# Patient Record
Sex: MALE | Race: WHITE | Employment: OTHER | URBAN - METROPOLITAN AREA
[De-identification: names, ages, dates, MRNs, and addresses within clinical notes are randomized per-mention and may not be internally consistent; named-entity substitution may affect disease eponyms.]

---

## 2020-04-24 ENCOUNTER — HOSPITAL ENCOUNTER (EMERGENCY)
Age: 55
Discharge: HOME | End: 2020-04-24
Attending: EMERGENCY MEDICINE | Admitting: EMERGENCY MEDICINE
Payer: MEDICARE

## 2020-04-24 ENCOUNTER — APPOINTMENT (OUTPATIENT)
Dept: VASCULAR SURGERY | Age: 55
End: 2020-04-24
Attending: PHYSICIAN ASSISTANT
Payer: MEDICARE

## 2020-04-24 ENCOUNTER — APPOINTMENT (OUTPATIENT)
Dept: GENERAL RADIOLOGY | Age: 55
End: 2020-04-24
Attending: PHYSICIAN ASSISTANT
Payer: MEDICARE

## 2020-04-24 VITALS
OXYGEN SATURATION: 98 % | RESPIRATION RATE: 20 BRPM | HEART RATE: 106 BPM | BODY MASS INDEX: 33.86 KG/M2 | SYSTOLIC BLOOD PRESSURE: 136 MMHG | TEMPERATURE: 97.8 F | DIASTOLIC BLOOD PRESSURE: 85 MMHG | HEIGHT: 72 IN | WEIGHT: 250 LBS

## 2020-04-24 DIAGNOSIS — L03.116 LEFT LEG CELLULITIS: Primary | ICD-10-CM

## 2020-04-24 DIAGNOSIS — M54.32 SCIATICA OF LEFT SIDE: ICD-10-CM

## 2020-04-24 PROCEDURE — 99283 EMERGENCY DEPT VISIT LOW MDM: CPT

## 2020-04-24 PROCEDURE — 74011250636 HC RX REV CODE- 250/636: Performed by: PHYSICIAN ASSISTANT

## 2020-04-24 PROCEDURE — 71045 X-RAY EXAM CHEST 1 VIEW: CPT

## 2020-04-24 PROCEDURE — 96372 THER/PROPH/DIAG INJ SC/IM: CPT

## 2020-04-24 PROCEDURE — 93971 EXTREMITY STUDY: CPT

## 2020-04-24 RX ORDER — METHYLPREDNISOLONE 4 MG/1
TABLET ORAL
Qty: 1 DOSE PACK | Refills: 0 | OUTPATIENT
Start: 2020-04-24 | End: 2020-05-03

## 2020-04-24 RX ORDER — HYDROCODONE BITARTRATE AND ACETAMINOPHEN 5; 325 MG/1; MG/1
1 TABLET ORAL
Qty: 8 TAB | Refills: 0 | Status: SHIPPED | OUTPATIENT
Start: 2020-04-24 | End: 2020-04-27

## 2020-04-24 RX ORDER — KETOROLAC TROMETHAMINE 10 MG/1
10 TABLET, FILM COATED ORAL
Qty: 20 TAB | Refills: 0 | Status: SHIPPED | OUTPATIENT
Start: 2020-04-24 | End: 2020-04-29

## 2020-04-24 RX ORDER — KETOROLAC TROMETHAMINE 15 MG/ML
15 INJECTION, SOLUTION INTRAMUSCULAR; INTRAVENOUS
Status: COMPLETED | OUTPATIENT
Start: 2020-04-24 | End: 2020-04-24

## 2020-04-24 RX ADMIN — KETOROLAC TROMETHAMINE 15 MG: 15 INJECTION, SOLUTION INTRAMUSCULAR; INTRAVENOUS at 16:47

## 2020-04-24 NOTE — ED TRIAGE NOTES
Patient to triage with left leg pain. Symptoms started 4 days ago. Pt denies injury/truama. The pt stated he has been working in his yard.

## 2020-04-24 NOTE — ED NOTES
Patient discharged and given discharge instructions. Patient had an opportunity to ask questions. Patient verbalized understanding of discharge instructions. Patient d/c from ED ambulatory, discharge instructions and prescriptions in hand. Patient accompanied by self. Removed and shredded pt's armband.

## 2020-04-24 NOTE — DISCHARGE INSTRUCTIONS
Patient Education        Sciatica: Care Instructions  Your Care Instructions    Sciatica (say \"hrn-AB-ee-kuh\") is an irritation of one of the sciatic nerves, which come from the spinal cord in the lower back. The sciatic nerves and their branches extend down through the buttock to the foot. Sciatica can develop when an injured disc in the back presses against a spinal nerve root. Its main symptom is pain, numbness, or weakness that is often worse in the leg or foot than in the back. Sciatica often will improve and go away with time. Early treatment usually includes medicines and exercises to relieve pain. Follow-up care is a key part of your treatment and safety. Be sure to make and go to all appointments, and call your doctor if you are having problems. It's also a good idea to know your test results and keep a list of the medicines you take. How can you care for yourself at home? · Take pain medicines exactly as directed. ? If the doctor gave you a prescription medicine for pain, take it as prescribed. ? If you are not taking a prescription pain medicine, ask your doctor if you can take an over-the-counter medicine. · Use heat or ice to relieve pain. ? To apply heat, put a warm water bottle, heating pad set on low, or warm cloth on your back. Do not go to sleep with a heating pad on your skin. ? To use ice, put ice or a cold pack on the area for 10 to 20 minutes at a time. Put a thin cloth between the ice and your skin. · Avoid sitting if possible, unless it feels better than standing. · Alternate lying down with short walks. Increase your walking distance as you are able to without making your symptoms worse. · Do not do anything that makes your symptoms worse. When should you call for help? Call 911 anytime you think you may need emergency care.  For example, call if:    · You are unable to move a leg at all.   [place] your doctor now or seek immediate medical care if:    · You have new or worse symptoms in your legs or buttocks. Symptoms may include:  ? Numbness or tingling. ? Weakness. ? Pain.     · You lose bladder or bowel control.    Watch closely for changes in your health, and be sure to contact your doctor if:    · You are not getting better as expected. Where can you learn more? Go to http://tonia-hodan.info/  Enter Z239 in the search box to learn more about \"Sciatica: Care Instructions. \"  Current as of: June 26, 2019Content Version: 12.4  © 5281-9246 [employer]. Care instructions adapted under license by [employer] (which disclaims liability or warranty for this information). If you have questions about a medical condition or this instruction, always ask your healthcare professional. [address] any warranty or liability for your use of this information.

## 2020-04-29 NOTE — ED PROVIDER NOTES
EMERGENCY DEPARTMENT HISTORY AND PHYSICAL EXAM    Date: 4/24/2020  Patient Name: Josse Lucio    History of Presenting Illness     Chief Complaint   Patient presents with    Leg Pain     left         History Provided By: patient        Additional History (Context): Josse Lucio is a 54-year-old male presenting to the emergency department with complaint of left leg pain. States he has had pain first 4 days. No injury or trauma noted. States pain is in the left posterior calf, worse with ambulation. Has not done anything that he thinks of it starts pain. PCP: None    Current Outpatient Medications   Medication Sig Dispense Refill    methylPREDNISolone (Medrol, Robert,) 4 mg tablet Per dose pack instructions 1 Dose Pack 0    ketorolac (TORADOL) 10 mg tablet Take 1 Tab by mouth every six (6) hours as needed for Pain for up to 5 days. 20 Tab 0    aspirin (ASPIRIN) 325 mg tablet Take 325 mg by mouth daily.  atorvastatin (LIPITOR) 80 mg tablet Take 80 mg by mouth daily.  ferrous sulfate (IRON) 325 mg (65 mg iron) EC tablet Take 325 mg by mouth three (3) times daily (with meals). Past History     Past Medical History:  Past Medical History:   Diagnosis Date    Anemia     Hypertension        Past Surgical History:  Past Surgical History:   Procedure Laterality Date    COLONOSCOPY         Family History:  Family History   Problem Relation Age of Onset    Hypertension Mother     High Cholesterol Mother     Elevated Lipids Mother     COPD Father     Heart Failure Brother     Diabetes Brother        Social History:  Social History     Tobacco Use    Smoking status: Current Every Day Smoker     Packs/day: 1.50     Years: 15.00     Pack years: 22.50   Substance Use Topics    Alcohol use:  Yes     Alcohol/week: 0.8 standard drinks     Types: 1 Cans of beer per week     Comment: states that he drinks 3 times per day    Drug use: No       Allergies:  No Known Allergies      Review of Systems       Review of Systems   Constitutional: Negative for chills and fever. HENT: Negative for nasal congestion, sore throat, rhinorrhea  Eyes: Negative. Respiratory: Negative for cough and negative for shortness of breath. Cardiovascular: Negative for chest pain and palpitations. Gastrointestinal: Negative for abdominal pain, constipation, diarrhea, nausea and vomiting. Genitourinary: Negative. Negative for difficulty urinating and flank pain. Musculoskeletal: Negative for back pain. Pos for leg pain Negative for gait problem and neck pain. Skin: Negative. Allergic/Immunologic: Negative. Neurological: Negative for dizziness, weakness, numbness and headaches. Psychiatric/Behavioral: Negative. All other systems reviewed and are negative. All Other Systems Negative  Physical Exam     Vitals:    04/24/20 1422 04/24/20 1709   BP: 135/83 136/85   Pulse: (!) 116 (!) 106   Resp: 20    Temp: 97.8 °F (36.6 °C)    SpO2: 98% 98%   Weight: 113.4 kg (250 lb)    Height: 6' (1.829 m)      Physical Exam  Vitals signs and nursing note reviewed. Constitutional:       General: He is not in acute distress. Appearance: He is well-developed. He is not diaphoretic. Comments: NAD, well hydrated, non toxic     HENT:      Head: Normocephalic and atraumatic. Nose: Nose normal.      Mouth/Throat:      Pharynx: No oropharyngeal exudate. Eyes:      Conjunctiva/sclera: Conjunctivae normal.      Pupils: Pupils are equal, round, and reactive to light. Neck:      Musculoskeletal: Normal range of motion and neck supple. Cardiovascular:      Rate and Rhythm: Normal rate and regular rhythm. Heart sounds: Normal heart sounds. No murmur. Pulmonary:      Effort: Pulmonary effort is normal. No respiratory distress. Breath sounds: Normal breath sounds. No wheezing or rales. Abdominal:      General: There is no distension. Palpations: Abdomen is soft. Tenderness:  There is no abdominal tenderness. There is no guarding. Musculoskeletal: Normal range of motion. General: No swelling. Comments: + left post calf TTP   Lymphadenopathy:      Cervical: No cervical adenopathy. Skin:     General: Skin is warm. Findings: No rash. Neurological:      General: No focal deficit present. Mental Status: He is alert and oriented to person, place, and time. Cranial Nerves: No cranial nerve deficit. Coordination: Coordination normal.   Psychiatric:         Behavior: Behavior normal.           Diagnostic Study Results     Labs -   No results found for this or any previous visit (from the past 12 hour(s)). Radiologic Studies -   XR CHEST PORT   Final Result   IMPRESSION:      Minimal discoid atelectasis or scarring at the left base. CT Results  (Last 48 hours)    None        CXR Results  (Last 48 hours)    None            Medical Decision Making   I am the first provider for this patient. I reviewed the vital signs, available nursing notes, past medical history, past surgical history, family history and social history. Vital Signs-Reviewed the patient's vital signs. Records Reviewed: Nursing notes, old medical records and any previous labs, imaging, visits, consultations pertinent to patient care    Procedures:  Procedures      ED Course: Progress Notes, Reevaluation, and Consults:      Provider Notes (Medical Decision Making):   Pt here with left leg pain. Starts in lower back and radiates down to toes. No skin changes. No wound. NVI  Exam consistent with sciatica, no neuro deficits, no back pain no red flags. Will treat supportively. If again improvement after Toradol injection. MED RECONCILIATION:  No current facility-administered medications for this encounter.       Current Outpatient Medications   Medication Sig    methylPREDNISolone (Medrol, Robert,) 4 mg tablet Per dose pack instructions    ketorolac (TORADOL) 10 mg tablet Take 1 Tab by mouth every six (6) hours as needed for Pain for up to 5 days.  aspirin (ASPIRIN) 325 mg tablet Take 325 mg by mouth daily.  atorvastatin (LIPITOR) 80 mg tablet Take 80 mg by mouth daily.  ferrous sulfate (IRON) 325 mg (65 mg iron) EC tablet Take 325 mg by mouth three (3) times daily (with meals). Disposition:  home    DISCHARGE NOTE:     Pt has been reexamined. Patient has no new complaints, changes, or physical findings. Care plan outlined and precautions discussed. Discussed proper way to take medications. Discussed treatment plan, return precautions, symptomatic relief, and expected time to improvement. All questions answered. Patient is stable for discharge and outpatient management. Patient is ready to go home. Follow-up Information     Follow up With Specialties Details Why Contact Info    [facility] EMERGENCY DEPT Emergency Medicine   [address] [facility] [address]  [address] [facility]0 [address], [address] [place] [address] 95.          Discharge Medication List as of 4/24/2020  5:13 PM      START taking these medications    Details   methylPREDNISolone (Medrol, Robert,) 4 mg tablet Per dose pack instructions, Print, Disp-1 Dose Pack, R-0      ketorolac (TORADOL) 10 mg tablet Take 1 Tab by mouth every six (6) hours as needed for Pain for up to 5 days. , Print, Disp-20 Tab, R-0         CONTINUE these medications which have NOT CHANGED    Details   aspirin (ASPIRIN) 325 mg tablet Take 325 mg by mouth daily. Historical Med, 325 mg      atorvastatin (LIPITOR) 80 mg tablet Take 80 mg by mouth daily. Historical Med, 80 mg      ferrous sulfate (IRON) 325 mg (65 mg iron) EC tablet Take 325 mg by mouth three (3) times daily (with meals). Historical Med, 325 mg                   Diagnosis     Clinical Impression:   sciatica        Dictation disclaimer:  Please note that this dictation was completed with Dragon, the computer voice recognition software. Quite often unanticipated grammatical, syntax, homophones, and other interpretive errors are inadvertently transcribed by the computer software. Please disregard these errors. Please excuse any errors that have escaped final proofreading.

## 2020-04-30 ENCOUNTER — HOSPITAL ENCOUNTER (EMERGENCY)
Age: 55
Discharge: HOME | End: 2020-04-30
Attending: EMERGENCY MEDICINE
Payer: MEDICARE

## 2020-04-30 ENCOUNTER — APPOINTMENT (OUTPATIENT)
Dept: GENERAL RADIOLOGY | Age: 55
End: 2020-04-30
Attending: STUDENT IN AN ORGANIZED HEALTH CARE EDUCATION/TRAINING PROGRAM
Payer: MEDICARE

## 2020-04-30 VITALS
BODY MASS INDEX: 33.86 KG/M2 | RESPIRATION RATE: 20 BRPM | OXYGEN SATURATION: 95 % | TEMPERATURE: 98.4 F | HEIGHT: 72 IN | WEIGHT: 250 LBS | SYSTOLIC BLOOD PRESSURE: 131 MMHG | HEART RATE: 90 BPM | DIASTOLIC BLOOD PRESSURE: 83 MMHG

## 2020-04-30 DIAGNOSIS — M54.32 SCIATICA OF LEFT SIDE: Primary | ICD-10-CM

## 2020-04-30 PROCEDURE — 74011250637 HC RX REV CODE- 250/637: Performed by: STUDENT IN AN ORGANIZED HEALTH CARE EDUCATION/TRAINING PROGRAM

## 2020-04-30 PROCEDURE — 99284 EMERGENCY DEPT VISIT MOD MDM: CPT

## 2020-04-30 PROCEDURE — 72110 X-RAY EXAM L-2 SPINE 4/>VWS: CPT

## 2020-04-30 RX ORDER — ACETAMINOPHEN 500 MG
1000 TABLET ORAL
Status: COMPLETED | OUTPATIENT
Start: 2020-04-30 | End: 2020-04-30

## 2020-04-30 RX ORDER — CYCLOBENZAPRINE HCL 5 MG
5 TABLET ORAL
Qty: 30 TAB | Refills: 0 | OUTPATIENT
Start: 2020-04-30 | End: 2020-05-03

## 2020-04-30 RX ORDER — CYCLOBENZAPRINE HCL 10 MG
5 TABLET ORAL
Status: COMPLETED | OUTPATIENT
Start: 2020-04-30 | End: 2020-04-30

## 2020-04-30 RX ADMIN — CYCLOBENZAPRINE 5 MG: 10 TABLET, FILM COATED ORAL at 10:05

## 2020-04-30 RX ADMIN — ACETAMINOPHEN 1000 MG: 500 TABLET ORAL at 10:05

## 2020-04-30 NOTE — ED PROVIDER NOTES
EMERGENCY DEPARTMENT HISTORY AND PHYSICAL EXAM    9:12 AM      Date: 4/30/2020  Patient Name: Francisco Rider    History of Presenting Illness     Chief Complaint   Patient presents with    Hip Pain     L hip radiates down leg       History Provided By: Patient  Location/Duration/Severity/Modifying factors   HPI    Patient is a 54year old M with PMH of hypertension, anemia who presents with left hip and leg pain. Patient states this began approximately 8 days ago. He presented to the ED this past week for similar symptoms. He reports sharp pain that starts in his left hip and shoots down to his knee and foot. He denies any trauma or falls. He denies any rashes. Rates the pain as 9/10. He has been taking methylprednisolone, Toradol, and norco for the pain. He has not received significant improvement from this regimen. He denies any lower extremity weakness, sensation loss, loss of bowel or bladder function, or symptoms consistent with saddle anaesthesia. Patient has not seen his PCP for this problem. PCP: None    Current Outpatient Medications   Medication Sig Dispense Refill    methylPREDNISolone (Medrol, Robert,) 4 mg tablet Per dose pack instructions 1 Dose Pack 0    aspirin (ASPIRIN) 325 mg tablet Take 325 mg by mouth daily.  atorvastatin (LIPITOR) 80 mg tablet Take 80 mg by mouth daily.  ferrous sulfate (IRON) 325 mg (65 mg iron) EC tablet Take 325 mg by mouth three (3) times daily (with meals).          Past History     Past Medical History:  Past Medical History:   Diagnosis Date    Anemia     Hypertension        Past Surgical History:  Past Surgical History:   Procedure Laterality Date    COLONOSCOPY         Family History:  Family History   Problem Relation Age of Onset    Hypertension Mother     High Cholesterol Mother     Elevated Lipids Mother     COPD Father     Heart Failure Brother     Diabetes Brother        Social History:  Social History     Tobacco Use    Smoking status: Current Every Day Smoker     Packs/day: 1.50     Years: 15.00     Pack years: 22.50    Smokeless tobacco: Current User   Substance Use Topics    Alcohol use: Yes     Alcohol/week: 0.8 standard drinks     Types: 1 Cans of beer per week     Comment: states that he drinks 3 times per day    Drug use: No       Allergies:  No Known Allergies      Review of Systems       Review of Systems   Constitutional: Negative. HENT: Negative. Eyes: Negative. Respiratory: Negative. Cardiovascular: Negative. Gastrointestinal: Negative. Genitourinary: Negative. Musculoskeletal:        Left hip, knee, foot pain      Skin: Negative for rash. Neurological: Negative for weakness and numbness. Hematological: Negative. Physical Exam     Visit Vitals  BP (!) 147/99 (BP 1 Location: Left arm)   Pulse 90   Temp 98.4 °F (36.9 °C)   Resp 20   Ht 6' (1.829 m)   Wt 113.4 kg (250 lb)   SpO2 95%   BMI 33.91 kg/m²         Physical Exam  Constitutional:       Comments: Appears to be in pain due to left lower extremity    Eyes:      General: No scleral icterus. Cardiovascular:      Rate and Rhythm: Normal rate and regular rhythm. Heart sounds: No murmur. No gallop. Pulmonary:      Effort: No respiratory distress. Breath sounds: No stridor. No wheezing, rhonchi or rales. Abdominal:      General: Bowel sounds are normal. There is no distension. Palpations: Abdomen is soft. Tenderness: There is no abdominal tenderness. There is no guarding. Hernia: No hernia is present. Musculoskeletal:      Right lower leg: No edema. Left lower leg: No edema. Comments: Right lower extremity wnl, left hip grossly normal in appearance without rash, erythema, swelling, or deformity, straight leg raise positive on the left, some pain with internal rotation of the hip    Neurological:      General: No focal deficit present. Sensory: No sensory deficit. Motor: No weakness. Comments: No saddle anesthesia. Psychiatric:         Mood and Affect: Mood normal.         Behavior: Behavior normal.           Diagnostic Study Results     Labs -  No results found for this or any previous visit (from the past 12 hour(s)). Radiologic Studies -   XR SPINE LUMB MIN 4 V   Final Result   IMPRESSION:      No clearly acute findings. Notable lower lumbar degenerative changes. Medical Decision Making   I am the first provider for this patient. I reviewed the vital signs, available nursing notes, past medical history, past surgical history, family history and social history. Vital Signs-Reviewed the patient's vital signs. Records Reviewed: Old Medical Records (Time of Review: 9:12 AM)    ED Course: Progress Notes, Reevaluation, and Consults:         Provider Notes (Medical Decision Making):   MDM   DDX: sciatica, RO, trochanteric bursitis, osteoarthritis, claudication, lumbar disk herniation     Patient is a 54year old M with PMH of hypertension, anemia who presents with left hip and leg pain. Patient with shooting pain from left buttock to left lower extremity. VSS. No concern for RO as he has no loss of bowel or bladder function, no saddle anesthesia. Physical examination consistent with sciatica. No focal deficits. Lumbar x rays show degenerative changes, negative for acute findings. Minimal/no improvement with Toradol, methylprednisone, norco. Will plan to trial flexeril and outpatient follow up with spine specialist. Patient stable for discharge home with follow up with PCP and ortho spine. Patient in agreement with plan. ED return precautions given if symptoms consistent with RO should develop. ATTENDING ATTESTATION:  Patient with sciatica no red flag symptoms  I personally saw and examined the patient. I have reviewed and agree with the residents findings, including all diagnostic interpretations, and plans as written.  I was present during the key portions of separately billed procedures. Kwesi Dc MD    Procedures      Diagnosis     Clinical Impression:   1. Sciatica of left side        Disposition: Home     Follow-up Information     Follow up With Specialties Details Why [address] EMERGENCY DEPT Emergency Medicine  If symptoms worsen [address] [facility]  Schedule an appointment as soon as possible for a visit   Nora Pollard. [id number] [facility] [address]  [phone]    Primary Care Doctor  Schedule an appointment as soon as possible for a visit              Patient's Medications   Start Taking    No medications on file   Continue Taking    ASPIRIN (ASPIRIN) 325 MG TABLET    Take 325 mg by mouth daily. ATORVASTATIN (LIPITOR) 80 MG TABLET    Take 80 mg by mouth daily. FERROUS SULFATE (IRON) 325 MG (65 MG IRON) EC TABLET    Take 325 mg by mouth three (3) times daily (with meals). METHYLPREDNISOLONE (MEDROL, JACK,) 4 MG TABLET    Per dose pack instructions   These Medications have changed    No medications on file   Stop Taking    No medications on file     Disclaimer: Sections of this note are dictated using utilizing voice recognition software. Minor typographical errors may be present. If questions arise, please do not hesitate to contact me or call our department.

## 2020-04-30 NOTE — ED NOTES
Pt with 7/10 pain to L lower back/hip. Minimal tingling to R lower leg. Meds prescribed here on 4/24 not helping per pt report. Able to ambulate, but it is uncomfortable.  Hooked up to continuous pulse ox, BP.

## 2020-04-30 NOTE — ED TRIAGE NOTES
Pt reports L hip pain that radiates down L leg for 5-6 days; seen over the weekend; prescribed Toradol, Norco and  Methylprednisolone dose pack with no relief.

## 2020-04-30 NOTE — ED NOTES
Pt discharge instructions reviewed with patient, patient denies questions and verbalizes understanding. Pt alert and oriented, no signs of distress.

## 2020-05-03 ENCOUNTER — HOSPITAL ENCOUNTER (EMERGENCY)
Age: 55
Discharge: HOME | End: 2020-05-03
Attending: EMERGENCY MEDICINE
Payer: MEDICARE

## 2020-05-03 VITALS
OXYGEN SATURATION: 96 % | SYSTOLIC BLOOD PRESSURE: 178 MMHG | HEART RATE: 104 BPM | TEMPERATURE: 99.4 F | RESPIRATION RATE: 22 BRPM | DIASTOLIC BLOOD PRESSURE: 98 MMHG

## 2020-05-03 DIAGNOSIS — M54.42 ACUTE LEFT-SIDED LOW BACK PAIN WITH LEFT-SIDED SCIATICA: Primary | ICD-10-CM

## 2020-05-03 DIAGNOSIS — R03.0 ELEVATED BLOOD PRESSURE READING: ICD-10-CM

## 2020-05-03 PROCEDURE — 99282 EMERGENCY DEPT VISIT SF MDM: CPT

## 2020-05-03 PROCEDURE — 74011250636 HC RX REV CODE- 250/636: Performed by: PHYSICIAN ASSISTANT

## 2020-05-03 PROCEDURE — 96372 THER/PROPH/DIAG INJ SC/IM: CPT

## 2020-05-03 RX ORDER — ACETAMINOPHEN 500 MG
1000 TABLET ORAL
Qty: 20 TAB | Refills: 0 | Status: ON HOLD | OUTPATIENT
Start: 2020-05-03 | End: 2021-10-12

## 2020-05-03 RX ORDER — METAXALONE 800 MG/1
800 TABLET ORAL 3 TIMES DAILY
Qty: 20 TAB | Refills: 0 | Status: SHIPPED | OUTPATIENT
Start: 2020-05-03 | End: 2020-05-10

## 2020-05-03 RX ORDER — LIDOCAINE 50 MG/G
PATCH TOPICAL
Qty: 1 PACKAGE | Refills: 0 | Status: SHIPPED | OUTPATIENT
Start: 2020-05-03 | End: 2020-05-26 | Stop reason: SDUPTHER

## 2020-05-03 RX ORDER — KETOROLAC TROMETHAMINE 15 MG/ML
15 INJECTION, SOLUTION INTRAMUSCULAR; INTRAVENOUS
Status: COMPLETED | OUTPATIENT
Start: 2020-05-03 | End: 2020-05-03

## 2020-05-03 RX ADMIN — KETOROLAC TROMETHAMINE 15 MG: 15 INJECTION, SOLUTION INTRAMUSCULAR; INTRAVENOUS at 14:28

## 2020-05-03 NOTE — DISCHARGE INSTRUCTIONS
Patient Education      Take medication as prescribed. Follow-up with the orthopedic physician within 2 days for reassessment. Bring the results from this visit with you for their review. Return to the ED immediately for any new, worsening, or persistent symptoms, including fever, abdominal pain, or any other medical concerns. Learning About Relief for Back Pain  What is back tension and strain? Back strain happens when you overstretch, or pull, a muscle in your back. You may hurt your back in an accident or when you exercise or lift something. Most back pain will get better with rest and time. You can take care of yourself at home to help your back heal.  What can you do first to relieve back pain? When you first feel back pain, try these steps:  · Walk. Take a short walk (10 to 20 minutes) on a level surface (no slopes, hills, or stairs) every 2 to 3 hours. Walk only distances you can manage without pain, especially leg pain. · Relax. Find a comfortable position for rest. Some people are comfortable on the floor or a medium-firm bed with a small pillow under their head and another under their knees. Some people prefer to lie on their side with a pillow between their knees. Don't stay in one position for too long. · Try heat or ice. Try using a heating pad on a low or medium setting, or take a warm shower, for 15 to 20 minutes every 2 to 3 hours. Or you can buy single-use heat wraps that last up to 8 hours. You can also try an ice pack for 10 to 15 minutes every 2 to 3 hours. You can use an ice pack or a bag of frozen vegetables wrapped in a thin towel. There is not strong evidence that either heat or ice will help, but you can try them to see if they help. You may also want to try switching between heat and cold. · Take pain medicine exactly as directed. ¨ If the doctor gave you a prescription medicine for pain, take it as prescribed.   ¨ If you are not taking a prescription pain medicine, ask your doctor if you can take an over-the-counter medicine. What else can you do? · Stretch and exercise. Exercises that increase flexibility may relieve your pain and make it easier for your muscles to keep your spine in a good, neutral position. And don't forget to keep walking. · Do self-massage. You can use self-massage to unwind after work or school or to energize yourself in the morning. You can easily massage your feet, hands, or neck. Self-massage works best if you are in comfortable clothes and are sitting or lying in a comfortable position. Use oil or lotion to massage bare skin. · Reduce stress. Back pain can lead to a vicious [place]: Distress about the pain tenses the muscles in your back, which in turn causes more pain. Learn how to relax your mind and your muscles to lower your stress. Where can you learn more? Go to http://toniaTamocohodan.info/. Enter W927 in the search box to learn more about \"Learning About Relief for Back Pain. \"  Current as of: March 21, 2017  Content Version: 11.5  © 5051-8835 [employer]. Care instructions adapted under license by [employer] (which disclaims liability or warranty for this information). If you have questions about a medical condition or this instruction, always ask your healthcare professional. [place] [address] any warranty or liability for your use of this information. Patient Education        Elevated Blood Pressure: Care Instructions  Your Care Instructions    Blood pressure is a measure of how hard the blood pushes against the walls of your arteries. It's normal for blood pressure to go up and down throughout the day. But if it stays up over time, you have high blood pressure. Two numbers tell you your blood pressure. The first number is the systolic pressure. It shows how hard the blood pushes when your heart is pumping. The second number is the diastolic pressure.  It shows how hard the blood pushes between heartbeats, when your heart is relaxed and filling with blood. An ideal blood pressure in adults is less than 120/80 (say \"120 over 80\"). High blood pressure is 140/90 or higher. You have high blood pressure if your top number is 140 or higher or your bottom number is 90 or higher, or both. The main test for high blood pressure is simple, fast, and painless. To diagnose high blood pressure, your doctor will test your blood pressure at different times. After testing your blood pressure, your doctor may ask you to test it again when you are home. If you are diagnosed with high blood pressure, you can work with your doctor to make a long-term plan to manage it. Follow-up care is a key part of your treatment and safety. Be sure to make and go to all appointments, and call your doctor if you are having problems. It's also a good idea to know your test results and keep a list of the medicines you take. How can you care for yourself at home? · Do not smoke. Smoking increases your risk for heart attack and stroke. If you need help quitting, talk to your doctor about stop-smoking programs and medicines. These can increase your chances of quitting for good. · Stay at a healthy weight. · Try to limit how much sodium you eat to less than 2,300 milligrams (mg) a day. Your doctor may ask you to try to eat less than 1,500 mg a day. · Be physically active. Get at least 30 minutes of exercise on most days of the week. Walking is a good choice. You also may want to do other activities, such as running, swimming, cycling, or playing tennis or team sports. · Avoid or limit alcohol. Talk to your doctor about whether you can drink any alcohol. · Eat plenty of fruits, vegetables, and low-fat dairy products. Eat less saturated and total fats. · Learn how to check your blood pressure at home. When should you call for help?   Call your doctor now or seek immediate medical care if:  ? · Your blood pressure is much higher than normal (such as 180/110 or higher). ? · You think high blood pressure is causing symptoms such as:  ¨ Severe headache. ¨ Blurry vision. ? Watch closely for changes in your health, and be sure to contact your doctor if:  ? · You do not get better as expected. Where can you learn more? Go to http://tonia-hodan.info/. Enter N575 in the search box to learn more about \"Elevated Blood Pressure: Care Instructions. \"  Current as of: September 21, 2016  Content Version: 11.4  © 1775-4355 [employer]. Care instructions adapted under license by [employer] (which disclaims liability or warranty for this information). If you have questions about a medical condition or this instruction, always ask your healthcare professional. [place] [address] any warranty or liability for your use of this information. Patient Education        Back Pain: Care Instructions  Your Care Instructions    Back pain has many possible causes. It is often related to problems with muscles and ligaments of the back. It may also be related to problems with the nerves, discs, or bones of the back. Moving, lifting, standing, sitting, or sleeping in an awkward way can strain the back. Sometimes you don't notice the injury until later. Arthritis is another common cause of back pain. Although it may hurt a lot, back pain usually improves on its own within several weeks. Most people recover in 12 weeks or less. Using good home treatment and being careful not to stress your back can help you feel better sooner. Follow-up care is a key part of your treatment and safety. Be sure to make and go to all appointments, and call your doctor if you are having problems. It's also a good idea to know your test results and keep a list of the medicines you take. How can you care for yourself at home? · Sit or lie in positions that are most comfortable and reduce your pain.  Try one of these positions when you lie down:  ? Lie on your back with your knees bent and supported by large pillows. ? Lie on the floor with your legs on the seat of a sofa or chair. ? Lie on your side with your knees and hips bent and a pillow between your legs. ? Lie on your stomach if it does not make pain worse. · Do not sit up in bed, and avoid soft couches and twisted positions. Bed rest can help relieve pain at first, but it delays healing. Avoid bed rest after the first day of back pain. · Change positions every 30 minutes. If you must sit for long periods of time, take breaks from sitting. Get up and walk around, or lie in a comfortable position. · Try using a heating pad on a low or medium setting for 15 to 20 minutes every 2 or 3 hours. Try a warm shower in place of one session with the heating pad. · You can also try an ice pack for 10 to 15 minutes every 2 to 3 hours. Put a thin cloth between the ice pack and your skin. · Take pain medicines exactly as directed. ? If the doctor gave you a prescription medicine for pain, take it as prescribed. ? If you are not taking a prescription pain medicine, ask your doctor if you can take an over-the-counter medicine. · Take short walks several times a day. You can start with 5 to 10 minutes, 3 or 4 times a day, and work up to longer walks. Walk on level surfaces and avoid hills and stairs until your back is better. · Return to work and other activities as soon as you can. Continued rest without activity is usually not good for your back. · To prevent future back pain, do exercises to stretch and strengthen your back and stomach. Learn how to use good posture, safe lifting techniques, and proper body mechanics. When should you call for help? Call your doctor now or seek immediate medical care if:    · You have new or worsening numbness in your legs.     · You have new or worsening weakness in your legs.  (This could make it hard to stand up.)     · You lose control of your bladder or bowels.    Watch closely for changes in your health, and be sure to contact your doctor if:    · You have a fever, lose weight, or don't feel well.     · You do not get better as expected. Where can you learn more? Go to http://tonia-hodan.info/  Enter I594 in the search box to learn more about \"Back Pain: Care Instructions. \"  Current as of: June 26, 2019Content Version: 12.4  © 9874-9789 [employer]. Care instructions adapted under license by [employer] (which disclaims liability or warranty for this information). If you have questions about a medical condition or this instruction, always ask your healthcare professional. [address] any warranty or liability for your use of this information.

## 2020-05-03 NOTE — ED TRIAGE NOTES
Pt arrived via triage from home with complaints of sciatica pain.  Pt was seen here for this recently and it is no better with the medications given

## 2020-05-03 NOTE — ED PROVIDER NOTES
EMERGENCY DEPARTMENT HISTORY AND PHYSICAL EXAM    12:59 PM      Date: 5/3/2020  Patient Name: Taylor Quezada    History of Presenting Illness     Chief Complaint   Patient presents with    Leg Pain       History Provided By: Patient     Additional History (Context): Taylor Quezada is a 54 y.o. male with hx of anemia, HTN who presents with c/o left sided low back pain x 2 weeks. Patient notes the pain is worse with movement and palpation. Notes the pain radiates into his hip and down his leg. Notes associated numbness and tingling. Notes he has been evaluated in the ED twice before but returned today because the medication has not helped. Denies fever or chills, abdominal pain, dysuria, hematuria, injury or trauma, urinary or bowel incontinence or retention, history of IV drug abuse, history of cancer, falls. PCP: None    Current Facility-Administered Medications   Medication Dose Route Frequency Provider Last Rate Last Dose    ketorolac (TORADOL) injection 15 mg  15 mg IntraMUSCular NOW Ca Hernandez PA         Current Outpatient Medications   Medication Sig Dispense Refill    metaxalone (Skelaxin) 800 mg tablet Take 1 Tab by mouth three (3) times daily for 7 days. 20 Tab 0    lidocaine (Lidoderm) 5 % Apply patch to the affected area for 12 hours a day and remove for 12 hours a day. 1 Package 0    acetaminophen (Tylenol Extra Strength) 500 mg tablet Take 2 Tabs by mouth every six (6) hours as needed for Pain. 20 Tab 0    aspirin (ASPIRIN) 325 mg tablet Take 325 mg by mouth daily.  atorvastatin (LIPITOR) 80 mg tablet Take 80 mg by mouth daily.  ferrous sulfate (IRON) 325 mg (65 mg iron) EC tablet Take 325 mg by mouth three (3) times daily (with meals).          Past History     Past Medical History:  Past Medical History:   Diagnosis Date    Anemia     Hypertension        Past Surgical History:  Past Surgical History:   Procedure Laterality Date    COLONOSCOPY Family History:  Family History   Problem Relation Age of Onset    Hypertension Mother     High Cholesterol Mother     Elevated Lipids Mother     COPD Father     Heart Failure Brother     Diabetes Brother        Social History:  Social History     Tobacco Use    Smoking status: Current Every Day Smoker     Packs/day: 1.50     Years: 15.00     Pack years: 22.50    Smokeless tobacco: Current User   Substance Use Topics    Alcohol use: Yes     Alcohol/week: 0.8 standard drinks     Types: 1 Cans of beer per week     Comment: states that he drinks 3 times per day    Drug use: No       Allergies:  No Known Allergies      Review of Systems       Review of Systems   Constitutional: Negative for chills and fever. Respiratory: Negative for shortness of breath. Cardiovascular: Negative for chest pain. Gastrointestinal: Negative for abdominal pain, nausea and vomiting. Musculoskeletal: Positive for back pain and myalgias. Skin: Negative for rash. Neurological: Negative for weakness. All other systems reviewed and are negative. Physical Exam     Visit Vitals  BP (!) 178/98   Pulse (!) 104   Temp 99.4 °F (37.4 °C)   Resp 22   SpO2 96%         Physical Exam  Vitals signs and nursing note reviewed. Constitutional:       General: He is not in acute distress. Appearance: Normal appearance. He is well-developed. He is not ill-appearing, toxic-appearing or diaphoretic. HENT:      Head: Normocephalic and atraumatic. Neck:      Musculoskeletal: Normal range of motion and neck supple. Cardiovascular:      Rate and Rhythm: Normal rate and regular rhythm. Heart sounds: Normal heart sounds. No murmur. No friction rub. No gallop. Pulmonary:      Effort: Pulmonary effort is normal. No respiratory distress. Breath sounds: Normal breath sounds. No wheezing or rales. Abdominal:      General: Bowel sounds are normal. There is no distension. Palpations: Abdomen is soft. Tenderness: There is no abdominal tenderness. There is no guarding or rebound. Musculoskeletal:      Lumbar back: He exhibits tenderness (left lumbar paravertebrals TTP without erythema/edema/ecchymosis). He exhibits normal range of motion, no swelling, no edema, no deformity and no spasm. Left upper leg: Normal. He exhibits no swelling and no edema. Left lower leg: Normal.      Comments: No saddle anesthesia, + SLR on left, full ROM of hip and knee, normal gait    Skin:     General: Skin is warm and dry. Findings: No rash. Neurological:      Mental Status: He is alert. Diagnostic Study Results     Labs -  No results found for this or any previous visit (from the past 12 hour(s)). Radiologic Studies -   No orders to display         Medical Decision Making   I am the first provider for this patient. I reviewed the vital signs, available nursing notes, past medical history, past surgical history, family history and social history. Vital Signs-Reviewed the patient's vital signs. Records Reviewed: Nursing Notes and Old Medical Records (Time of Review: 12:59 PM)  X-ray on 4/30 negative for acute process, Dc'd with Flexeril  Doppler on 4/24 negative, Dc'd with Norco, Ketorolac, Prednisone    ED Course: Progress Notes, Reevaluation, and Consults:  1:00 PM Reviewed plan with patient. Discussed need for close outpatient follow-up this week for reassessment. Discussed strict return precautions, including fever, vomiting, or any other medical concerns. Pt in agreement with plan, ready to go home. One or more blood pressure readings were noted elevated during the patient's presentation in the emergency department today. This abnormal reading has been cited in the patients' diagnosis, and they have been encouraged to follow up with their primary care physician, or referred to a consultant for further evaluation and treatment.     Provider Notes (Medical Decision Making): 51-year-old male who presents to the ED due to left-sided low back pain x2 weeks. Afebrile, nontoxic-appearing, looks well. No red flag symptoms. No injury or trauma. Recent x-ray negative for acute process. Do not feel further labs or imaging are warranted. Do not suspect epidural abscess, cauda equina. Stable for discharge with symptomatic management, and close outpatient follow-up with orthopedic for further assessment. Diagnosis     Clinical Impression:   1. Acute left-sided low back pain with left-sided sciatica    2. Elevated blood pressure reading        Disposition: home     Follow-up Information     Follow up With Specialties Details Why [facility]    [facility] EMERGENCY DEPT Emergency Medicine  If symptoms worsen [address] [address]  [address]  Schedule an appointment as soon as possible for a visit  [facility] - [place] [id number] Orthopedic Surgery Schedule an appointment as soon as possible for a visit  [address], [address] [facility] [address].           Patient's Medications   Start Taking    ACETAMINOPHEN (TYLENOL EXTRA STRENGTH) 500 MG TABLET    Take 2 Tabs by mouth every six (6) hours as needed for Pain. LIDOCAINE (LIDODERM) 5 %    Apply patch to the affected area for 12 hours a day and remove for 12 hours a day. METAXALONE (SKELAXIN) 800 MG TABLET    Take 1 Tab by mouth three (3) times daily for 7 days. Continue Taking    ASPIRIN (ASPIRIN) 325 MG TABLET    Take 325 mg by mouth daily. ATORVASTATIN (LIPITOR) 80 MG TABLET    Take 80 mg by mouth daily. FERROUS SULFATE (IRON) 325 MG (65 MG IRON) EC TABLET    Take 325 mg by mouth three (3) times daily (with meals).    These Medications have changed    No medications on file   Stop Taking    CYCLOBENZAPRINE (FLEXERIL) 5 MG TABLET    Take 1 Tab by mouth three (3) times daily as needed for Muscle Spasm(s). METHYLPREDNISOLONE (MEDROL, JACK,) 4 MG TABLET    Per dose pack instructions       Dictation disclaimer:  Please note that this dictation was completed with [email or web address], the computer voice recognition software. Quite often unanticipated grammatical, syntax, homophones, and other interpretive errors are inadvertently transcribed by the computer software. Please disregard these errors. Please excuse any errors that have escaped final proofreading.

## 2020-05-08 ENCOUNTER — VIRTUAL VISIT (OUTPATIENT)
Dept: ORTHOPEDIC SURGERY | Age: 55
End: 2020-05-08

## 2020-05-08 DIAGNOSIS — M54.16 ACUTE LEFT LUMBAR RADICULOPATHY: Primary | ICD-10-CM

## 2020-05-08 RX ORDER — GABAPENTIN 300 MG/1
300 CAPSULE ORAL 3 TIMES DAILY
Qty: 90 CAP | Refills: 5 | Status: ON HOLD | OUTPATIENT
Start: 2020-05-08 | End: 2020-09-22

## 2020-05-08 RX ORDER — PREDNISONE 10 MG/1
10 TABLET ORAL SEE ADMIN INSTRUCTIONS
Qty: 21 TAB | Refills: 0 | Status: SHIPPED | OUTPATIENT
Start: 2020-05-08 | End: 2020-07-13 | Stop reason: ALTCHOICE

## 2020-05-08 NOTE — PROGRESS NOTES
[address]  [place], [address] [place] [address]  Phone: [phone]  Fax: [phone]        April Hard  : 1965  PCP: None  2020    NEW PATIENT    Consent: Wilhelminia Bernheim is a 54 y.o. male who was seen by synchronous (real-time) audio-video technology, and/or her healthcare decision maker, is aware that this patient-initiated, Telehealth encounter on 2020 is a billable service, with coverage as determined by his/her insurance carrier. He/She is aware that he/she may receive a bill and has provided verbal consent to proceed: Yes. The visit was conducted in the office with the patient being in home through a Doxy platform. Visit video time start: 11:03AM end: 11:29AM      HISTORY OF PRESENT ILLNESS  Wilhelminia Bernheim is a 54 y.o. male with hx of stroke(speech deficit) c/o left low leg and low back pain. This is a new problem that started approximately 4 weeks ago. He had been doing yardwork, planting bulbs for a few days prior to his symptoms. Since onset, this has worsened despite having gone to the ED twice. None of the medications have helped including medrol, muscle relaxers, NSAIDS, tylenol. He has constant symptoms that are provoked mainly by standing and walking at this time. Sitting and laying are positions of comfort. The symptoms run from the buttock to the side of the leg and top of the foot. His strength is intact. He has no sensory deficit at this time. Pain Scale: 8/10    ASSESSMENT  This patient is a 53 yo M with left low back and leg pain. This is most consistent with a left L5 radiculopathy. He has a +SLR on exam, but full strength and sensation. PLAN  1. Physical therapy  2. Prednisone dosepak  3. Gabapentin 300mg TID ramp - he can call if he needs to increase this to 600 TID  4. He was encouraged to stop smoking      Pt will f/u in 6 weeks or sooner if needed.       Diagnoses and all orders for this visit: 1. Acute left lumbar radiculopathy  -     gabapentin (Neurontin) 300 mg capsule; Take 1 Cap by mouth three (3) times daily. Max Daily Amount: 900 mg.  -     REFERRAL TO PHYSICAL THERAPY    Other orders  -     predniSONE (STERAPRED DS) 10 mg dose pack; Take 1 Tab by mouth See Admin Instructions. See administration instruction per 10mg dose pack             CHIEF COMPLAINT  Singh Median is seen today in consultation at the request of None for complaints of low back and leg pain. PAST MEDICAL HISTORY   Past Medical History:   Diagnosis Date    Anemia     Hypertension        Past Surgical History:   Procedure Laterality Date    COLONOSCOPY         MEDICATIONS    Current Outpatient Medications   Medication Sig Dispense Refill    metaxalone (Skelaxin) 800 mg tablet Take 1 Tab by mouth three (3) times daily for 7 days. 20 Tab 0    lidocaine (Lidoderm) 5 % Apply patch to the affected area for 12 hours a day and remove for 12 hours a day. 1 Package 0    acetaminophen (Tylenol Extra Strength) 500 mg tablet Take 2 Tabs by mouth every six (6) hours as needed for Pain. 20 Tab 0    aspirin (ASPIRIN) 325 mg tablet Take 325 mg by mouth daily.  atorvastatin (LIPITOR) 80 mg tablet Take 80 mg by mouth daily.  ferrous sulfate (IRON) 325 mg (65 mg iron) EC tablet Take 325 mg by mouth three (3) times daily (with meals). ALLERGIES  No Known Allergies       SOCIAL HISTORY    Social History     Socioeconomic History    Marital status:      Spouse name: Not on file    Number of children: Not on file    Years of education: Not on file    Highest education level: Not on file   Tobacco Use    Smoking status: Current Every Day Smoker     Packs/day: 1.50     Years: 15.00     Pack years: 22.50    Smokeless tobacco: Current User   Substance and Sexual Activity    Alcohol use:  Yes     Alcohol/week: 0.8 standard drinks     Types: 1 Cans of beer per week     Comment: states that he drinks 3 times per day    Drug use: No    Sexual activity: Not Currently   Social History Narrative    ** Merged History Encounter **            FAMILY HISTORY  Family History   Problem Relation Age of Onset    Hypertension Mother     High Cholesterol Mother     Elevated Lipids Mother     COPD Father     Heart Failure Brother     Diabetes Brother          REVIEW OF SYSTEMS  Review of Systems   Musculoskeletal: Positive for back pain. All other systems reviewed and are negative. PHYSICAL EXAMINATION  There were no vitals taken for this visit. No flowsheet data found.      -Constitutional: Healthy appearing, well developed, alert, in no acute distress  - Eyes: Conjunctiva clear, lids unremarkable, sclera anicteric  - Ears, nose, mouth, and throat: External inspection head, face, ears and nose identifies normal appearance without obvious deformity.  -Neck: No asymmetry, no masses, trachea is midline, and normal overall appearance.  -Respiratory: Respirations are even and unlabored. -Musculoskeletal: No cyanosis, clubbing, or edema observed  -Musculoskeletal: Able to walk without assistance with normal gait pattern  -Musculoskeletal: Inspection of the following identifies no gross deformity, misalignment, or asymmetry:   -Head/neck   -Spine   -Ribs/pelvis   -Right upper extremity    -Left upper extremity    -Right lower extremity  -Left lower extremity  -Musculoskeletal: Assessment of range of motion of the following identifies no gross limitations:    -Head/neck   -Spine   -Ribs/pelvis   -Right upper extremity    -Left upper extremity    -Right lower extremity  -Left lower extremity  -Skin: Head and neck skin with no lesions or rash  -Neurologic: Cranial nerves 3-12 grossly intact.  -Neurologic: +L SLR, sensation to the L5 and S1 dermatomes is intact, L5 myotome strength is intact   -Psychiatric: Judgement and insight intact.     The limitations of a telemedicine visit including the inability to perform a detailed physical examination may miss significant findings was presented to the patient, and the patient still elected to proceed with the telemedicine visit. RADIOGRAPHS  Lumbar x-ray images taken on 4/30/2020 personally reviewed with patient:  FINDINGS: 5 views of the lumbar spine obtained. Vertebral body heights  preserved. Disc space loss at L5-S1. Lower lumbar endplate spurring. No  significant listhesis. Lumbar lordosis maintained. No discrete pars defects  identified. Lower lumbar mild facet arthropathy. No acute fracture identified.     IMPRESSION  IMPRESSION:     No clearly acute findings. Notable lower lumbar degenerative changes.  reviewed    Mr. Ryan Huynh has a reminder for a \"due or due soon\" health maintenance. I have asked that he contact his primary care provider for follow-up on this health maintenance. Pursuant to the emergency declaration under the [address] [place] [address], [facility] waiver authority and the [employer] Act, this Virtual  Visit was conducted, with patient's consent, to reduce the patient's risk of exposure to COVID-19 and provide continuity of care for an established patient. Services were provided through a video synchronous discussion virtually to substitute for in-person clinic visit.     CPT Codes 45583-03431 for Established Patients may apply to this Telehealth Visit  Time-based coding, delete if not needed: I spent at least 20 minutes with this new patient, and >50% of the time was spent counseling and/or coordinating care regarding lumbar radiculopathy, medications, PT, HEP

## 2020-05-11 ENCOUNTER — DOCUMENTATION ONLY (OUTPATIENT)
Dept: ORTHOPEDIC SURGERY | Age: 55
End: 2020-05-11

## 2020-05-21 NOTE — TELEPHONE ENCOUNTER
Patient's roommate (Al) left a message requesting a refill on lidocaine (Lidoderm) 5 % to be sent to [facility]. I show this was last prescribed by a hospitalist and not by our office. He stated the Gabapentin and Cyclobenzaprine were not working. Last visit:  5/8/20 with MD Katiana Gallardo  Next appt:   Advised to follow-up in 6 weeks

## 2020-05-26 RX ORDER — LIDOCAINE 50 MG/G
PATCH TOPICAL
Qty: 30 PATCH | Refills: 0 | Status: ON HOLD | OUTPATIENT
Start: 2020-05-26 | End: 2020-09-22

## 2020-05-26 NOTE — TELEPHONE ENCOUNTER
Lidocaine sent to pharmacy. May have to use [facility] as insurance may not cover this. Increase gabapentin to 600 mg TID. We will not be giving pain medication.

## 2020-05-26 NOTE — TELEPHONE ENCOUNTER
Humphrey Sim was contacted and answered. He was informed lidocaine was sent to pharmacy. Mr. Tristin Irizarry gave me a number to contact Dominique Peoples. Patient verified name and . When giving instructions to increase gabapentin, patient interrupted and declined.

## 2020-05-26 NOTE — TELEPHONE ENCOUNTER
Patient Room Mate Kirkwood Merlin called again and said that the patient has tried the [employer] and that they do not work. Mr. Juan Daniel Jones is once again Requesting Lidocaine 5% Patches. As well as some pain medication for the patient. Vee Mazariegos. [phone]. Kirkwood Merlin tel.  [phone]

## 2020-07-13 ENCOUNTER — OFFICE VISIT (OUTPATIENT)
Dept: ORTHOPEDIC SURGERY | Age: 55
End: 2020-07-13

## 2020-07-13 VITALS
DIASTOLIC BLOOD PRESSURE: 76 MMHG | HEART RATE: 100 BPM | BODY MASS INDEX: 37.38 KG/M2 | OXYGEN SATURATION: 96 % | SYSTOLIC BLOOD PRESSURE: 117 MMHG | WEIGHT: 276 LBS | TEMPERATURE: 98.6 F | RESPIRATION RATE: 26 BRPM | HEIGHT: 72 IN

## 2020-07-13 DIAGNOSIS — M54.16 ACUTE LEFT LUMBAR RADICULOPATHY: Primary | ICD-10-CM

## 2020-07-13 DIAGNOSIS — E66.01 SEVERE OBESITY (HCC): ICD-10-CM

## 2020-07-13 RX ORDER — TRAMADOL HYDROCHLORIDE 50 MG/1
50-100 TABLET ORAL
Status: ON HOLD | COMMUNITY
Start: 2020-06-16 | End: 2021-10-12

## 2020-07-13 RX ORDER — PREGABALIN 225 MG/1
225 CAPSULE ORAL 2 TIMES DAILY
Qty: 60 CAP | Refills: 2 | Status: SHIPPED | OUTPATIENT
Start: 2020-07-13 | End: 2020-07-14 | Stop reason: SDUPTHER

## 2020-07-13 RX ORDER — PREGABALIN 150 MG/1
150 CAPSULE ORAL 2 TIMES DAILY
Qty: 60 CAP | Refills: 2 | Status: CANCELLED | OUTPATIENT
Start: 2020-07-13

## 2020-07-13 NOTE — PROGRESS NOTES
Loy Melchor presents today for   Chief Complaint   Patient presents with    Back Pain     lower left    Buttocks pain     left    Leg Pain     left       Is someone accompanying this pt? no    Is the patient using any DME equipment during OV? no    Coordination of Care:  1. Have you been to the ER, urgent care clinic since your last visit? no  Hospitalized since your last visit? no    2. Have you seen or consulted any other health care providers outside of the [address] [place] [address] since your last visit? Yes, orthopedics Include any pap smears or colon screening.  no

## 2020-07-13 NOTE — PROGRESS NOTES
[address]  [address], [address]  [place], [address] [place] [address] Street  Phone: [phone]  Fax: [phone]        Bulmaro Shelton  : 1965  PCP: None  2020    PROGRESS NOTE      HISTORY OF PRESENT ILLNESS  Tate Grant is a 54 y.o. male who was seen as a new patient 2020 with c/o hx of stroke(speech deficit) c/o left low leg and low back pain x 4 weeks. He had been doing yardwork, planting bulbs for a few days prior to his symptoms. Since onset, it worsened despite having gone to the ED twice. None of the medications helped including medrol, muscle relaxers, NSAIDS, tylenol. He had constant symptoms that were provoked mainly by standing and walking. Sitting and laying were positions of comfort. The symptoms ran from the buttock to the side of the leg and top of the foot. His strength was intact, and he had no sensory deficit at the time. Tate Grant comes in to the office today for f/u. He continues to have low back pain radiating into the LLE into the top of the foot. He did not find improvement from a Prednisone dose pack. He has not seen much benefit from Gabapentin 900 mg TID. He was unable to begin PT. Pain Score: 6/10    PmHx: stroke (speech deficits)    ASSESSMENT  This is a 54year-old male with acute low back pain radiating into the LLE. His symptoms are likely due to an acute left L5/S1 radiculopathy. He had a positive SLR, weakness with ankle PF, and decreased sensation in an S1 distribution on the L. PLAN  1. Lyrica 225 mg BID - failed Gabapentin. 2. Lumbar MRI - low back pain radiating into LLE, weakness, decreased sensation in an S1 distribution on the L and + SLR      Pt will f/u after MRI or sooner as needed. Diagnoses and all orders for this visit:    1. Acute left lumbar radiculopathy  -     MRI LUMB SPINE WO CONT; Future  -     pregabalin (LYRICA) 225 mg capsule; Take 1 Cap by mouth two (2) times a day.  Max Daily Amount: 450 mg.  -     REFERRAL TO PHYSICAL THERAPY         PAST MEDICAL HISTORY   Past Medical History:   Diagnosis Date    Anemia     Hypertension     Stroke [facility])        Past Surgical History:   Procedure Laterality Date    COLONOSCOPY     . MEDICATIONS    Current Outpatient Medications   Medication Sig Dispense Refill    lidocaine (Lidoderm) 5 % Apply patch to the affected area for 12 hours a day and remove for 12 hours a day. 30 Patch 0    gabapentin (Neurontin) 300 mg capsule Take 1 Cap by mouth three (3) times daily. Max Daily Amount: 900 mg. 90 Cap 5    predniSONE (STERAPRED DS) 10 mg dose pack Take 1 Tab by mouth See Admin Instructions. See administration instruction per 10mg dose pack 21 Tab 0    acetaminophen (Tylenol Extra Strength) 500 mg tablet Take 2 Tabs by mouth every six (6) hours as needed for Pain. 20 Tab 0    atorvastatin (LIPITOR) 80 mg tablet Take 80 mg by mouth daily.             ALLERGIES  No Known Allergies       SOCIAL HISTORY    Social History     Socioeconomic History    Marital status:      Spouse name: Not on file    Number of children: Not on file    Years of education: Not on file    Highest education level: Not on file   Tobacco Use    Smoking status: Current Every Day Smoker     Packs/day: 1.00     Years: 15.00     Pack years: 15.00    Smokeless tobacco: Current User   Substance and Sexual Activity    Alcohol use: Not Currently     Alcohol/week: 0.8 standard drinks     Types: 1 Cans of beer per week     Comment: states that he drinks 3 times per day    Drug use: No    Sexual activity: Not Currently   Social History Narrative    ** Merged History Encounter **            FAMILY HISTORY  Family History   Problem Relation Age of Onset    Hypertension Mother     High Cholesterol Mother     Elevated Lipids Mother     COPD Father     Heart Failure Brother     Diabetes Brother          REVIEW OF SYSTEMS  Review of Systems   Musculoskeletal: Positive for back pain. LLE paraesthesia          PHYSICAL EXAMINATION  Visit Vitals  /76 (BP 1 Location: Left arm, BP Patient Position: Sitting)   Pulse 100   Temp 98.6 °F (37 °C) (Oral)   Resp 26   Ht 6' (1.829 m)   Wt 276 lb (125.2 kg)   SpO2 96% Comment: RA   BMI 37.43 kg/m²       Pain Assessment  7/13/2020   Location of Pain Back;Leg;Buttocks   Location Modifiers Left   Severity of Pain 6   Quality of Pain Other (Comment)   Quality of Pain Comment \"it just bothers me\", pt cannot describe   Duration of Pain Persistent   Frequency of Pain Constant   Aggravating Factors Walking; Other (Comment)   Aggravating Factors Comment yard work   Limiting Behavior Yes   Relieving Factors Other (Comment)   Relieving Factors Comment stop activity   Result of Injury No           Constitutional:  Well developed, well nourished, in no acute distress. Psychiatric: Affect and mood are appropriate. Integumentary: No rashes or abrasions noted on exposed areas. SPINE/MUSCULOSKELETAL EXAM    Lumbar spine:  No rash, ecchymosis, or gross obliquity. No fasciculations. No focal atrophy is noted. No pain with hip ROM. Full range of motion. No tenderness to palpation. No tenderness to palpation at the sciatic notch. SI joints non-tender. Trochanters non tender. Decreased sensation on the L in an S1 distribution. Positive Straight Leg Raise on the Left. MOTOR:      Biceps  Triceps Deltoids Wrist Ext Wrist Flex Hand Intrin   Right 5/5 5/5 5/5 5/5 5/5 5/5   Left 5/5 5/5 5/5 5/5 5/5 5/5             Hip Flex  Quads Hamstrings Ankle DF EHL Ankle PF   Right 5/5 5/5 5/5 5/5 5/5 5/5   Left 5/5 5/5 5/5 5/5 5/5 5/5       RADIOGRAPHS  Lumbar x-ray images taken on 4/30/2020 personally reviewed with patient:  FINDINGS: 5 views of the lumbar spine obtained. Vertebral body heights  preserved. Disc space loss at L5-S1. Lower lumbar endplate spurring. No  significant listhesis. Lumbar lordosis maintained.  No discrete pars defects  identified. Lower lumbar mild facet arthropathy. No acute fracture identified.     IMPRESSION  IMPRESSION:     No clearly acute findings. Notable lower lumbar degenerative changes. 12 minutes of face-to-face contact were spent with the patient during today's visit extensively discussing symptoms and treatment plan. All questions were answered. More than half of this visit today was spent on counseling.      Written by Luisa Handy as dictated by Thiago Vargas MD

## 2020-07-14 ENCOUNTER — TELEPHONE (OUTPATIENT)
Dept: ORTHOPEDIC SURGERY | Age: 55
End: 2020-07-14

## 2020-07-14 DIAGNOSIS — M54.16 ACUTE LEFT LUMBAR RADICULOPATHY: ICD-10-CM

## 2020-07-14 RX ORDER — PREGABALIN 225 MG/1
225 CAPSULE ORAL 2 TIMES DAILY
Qty: 60 CAP | Refills: 2 | Status: SHIPPED | OUTPATIENT
Start: 2020-07-14 | End: 2020-07-27 | Stop reason: DRUGHIGH

## 2020-07-14 NOTE — TELEPHONE ENCOUNTER
When I called Nadine to CXL to [employer], the patient had already picked the RX up because the cost at this pharmacy is only $25 and it would have cost him with coupon at [facility] $125. Now I do not know how to fix the RX in [facility] , can you do that?

## 2020-07-14 NOTE — TELEPHONE ENCOUNTER
Patient called stating that his prescription for Lyrica was sent to the wrong pharmacy and was supposed to go to [facility] on [address].  He asked if it can be sent the [facility]. Please advise patient at [phone]

## 2020-07-27 ENCOUNTER — OFFICE VISIT (OUTPATIENT)
Dept: ORTHOPEDIC SURGERY | Age: 55
End: 2020-07-27

## 2020-07-27 VITALS
TEMPERATURE: 98.3 F | WEIGHT: 277 LBS | BODY MASS INDEX: 37.52 KG/M2 | OXYGEN SATURATION: 97 % | DIASTOLIC BLOOD PRESSURE: 84 MMHG | RESPIRATION RATE: 20 BRPM | HEART RATE: 96 BPM | SYSTOLIC BLOOD PRESSURE: 133 MMHG | HEIGHT: 72 IN

## 2020-07-27 DIAGNOSIS — M54.16 ACUTE LEFT LUMBAR RADICULOPATHY: Primary | ICD-10-CM

## 2020-07-27 RX ORDER — ROSUVASTATIN CALCIUM 40 MG/1
1 TABLET, COATED ORAL
Status: ON HOLD | COMMUNITY
Start: 2020-07-20 | End: 2021-10-12

## 2020-07-27 RX ORDER — METFORMIN HYDROCHLORIDE 500 MG/1
1000 TABLET, EXTENDED RELEASE ORAL DAILY
Status: ON HOLD | COMMUNITY
Start: 2020-07-20 | End: 2020-09-22

## 2020-07-27 RX ORDER — PREGABALIN 300 MG/1
300 CAPSULE ORAL 2 TIMES DAILY
Qty: 60 CAP | Refills: 2 | Status: SHIPPED | OUTPATIENT
Start: 2020-07-27 | End: 2021-08-26

## 2020-07-27 NOTE — PROGRESS NOTES
Loy Melchor presents today for   Chief Complaint   Patient presents with    LOW BACK PAIN    Leg Pain     left    Follow-up       Is someone accompanying this pt? NO    Is the patient using any DME equipment during OV? NO    Depression Screening:  3 most recent PHQ Screens 7/27/2020   Little interest or pleasure in doing things Not at all   Feeling down, depressed, irritable, or hopeless Not at all   Total Score PHQ 2 0       Learning Assessment:  Learning Assessment 7/27/2020   PRIMARY LEARNER Patient   HIGHEST LEVEL OF EDUCATION - PRIMARY LEARNER  GRADUATED HIGH SCHOOL OR GED   BARRIERS PRIMARY LEARNER VISUAL     COGNITIVE   CO-LEARNER CAREGIVER No   PRIMARY LANGUAGE ENGLISH   LEARNER PREFERENCE PRIMARY OTHER (COMMENT)   ANSWERED BY PATIENT   RELATIONSHIP SELF       Abuse Screening:  Abuse Screening Questionnaire 7/27/2020   Do you ever feel afraid of your partner? N   Are you in a relationship with someone who physically or mentally threatens you? N   Is it safe for you to go home? Y       OPIOID RISK TOOL  No flowsheet data found. Pt currently taking Antiplatelet therapy? NO    Coordination of Care:  1. Have you been to the ER, urgent care clinic since your last visit? NO  Hospitalized since your last visit? NO    2. Have you seen or consulted any other health care providers outside of the [address] [place] [address] since your last visit? NO Include any pap smears or colon screening.  NO

## 2020-07-27 NOTE — PROGRESS NOTES
[address], [address]  [place], [address] [place] [address] Street  Phone: [phone]  Fax: [phone]        Reba Childress  : 1965  PCP: None  2020    PROGRESS NOTE      HISTORY OF PRESENT ILLNESS  Trudy Cuenca is a 54 y.o. male who was seen as a new patient 2020 with c/o hx of stroke(speech deficit) c/o left low leg and low back pain x 4 weeks. He had been doing yardwork, planting bulbs for a few days prior to his symptoms. Since onset, it worsened despite having gone to the ED twice. None of the medications helped including medrol, muscle relaxers, NSAIDS, tylenol. He had constant symptoms that were provoked mainly by standing and walking. Sitting and laying were positions of comfort. The symptoms ran from the buttock to the side of the leg and top of the foot. His strength was intact, and he had no sensory deficit at the time. He continued to have low back pain radiating into the LLE into the top of the foot. He did not find improvement from a Prednisone dose pack. He has not seen much benefit from Gabapentin 900 mg TID. He was unable to begin PT. Trudy Cuenca comes in to the office today for f/u. Pt returns today with increased symptoms of low back pain radiating into the LLE. Pt notes that he was not called in regards to his MRI. He is scheduled to begin PT this week. He has found some benefit initially from Lyrica 225 mg BID but notes that it does not last very long. Pain Score: 7/10. PmHx: stroke (speech deficits)    ASSESSMENT  This is a 54year-old male with acute low back pain radiating into the LLE. His symptoms are likely due to an acute left L5/S1 radiculopathy. He had a positive SLR, weakness with ankle PF, and decreased sensation in an S1 distribution on the L. PLAN  1. Increased Lyrica to 300 mg BID.   2. Lumbar MRI - increased symptoms; low back pain radiating into LLE, weakness, decreased sensation in an S1 distribution on the L and + SLR      Pt will f/u after MRI or sooner as needed. Diagnoses and all orders for this visit:    1. Acute left lumbar radiculopathy  -     pregabalin (LYRICA) 300 mg capsule; Take 1 Cap by mouth two (2) times a day. Max Daily Amount: 600 mg.  -     MRI LUMB SPINE WO CONT; Future         PAST MEDICAL HISTORY   Past Medical History:   Diagnosis Date    Anemia     Hypertension     Stroke [facility])        Past Surgical History:   Procedure Laterality Date    COLONOSCOPY     . MEDICATIONS    Current Outpatient Medications   Medication Sig Dispense Refill    pregabalin (LYRICA) 225 mg capsule Take 1 Cap by mouth two (2) times a day. Max Daily Amount: 450 mg. 60 Cap 2    traMADoL (ULTRAM) 50 mg tablet Take  mg by mouth every eight (8) hours as needed.  lidocaine (Lidoderm) 5 % Apply patch to the affected area for 12 hours a day and remove for 12 hours a day. 30 Patch 0    gabapentin (Neurontin) 300 mg capsule Take 1 Cap by mouth three (3) times daily. Max Daily Amount: 900 mg. 90 Cap 5    acetaminophen (Tylenol Extra Strength) 500 mg tablet Take 2 Tabs by mouth every six (6) hours as needed for Pain. 20 Tab 0    atorvastatin (LIPITOR) 80 mg tablet Take 80 mg by mouth daily.             ALLERGIES  No Known Allergies       SOCIAL HISTORY    Social History     Socioeconomic History    Marital status:      Spouse name: Not on file    Number of children: Not on file    Years of education: Not on file    Highest education level: Not on file   Tobacco Use    Smoking status: Current Every Day Smoker     Packs/day: 1.00     Years: 15.00     Pack years: 15.00    Smokeless tobacco: Current User   Substance and Sexual Activity    Alcohol use: Not Currently     Alcohol/week: 0.8 standard drinks     Types: 1 Cans of beer per week     Comment: states that he drinks 3 times per day    Drug use: No    Sexual activity: Not Currently   Social History Narrative    ** Merged History Encounter **            FAMILY HISTORY  Family History   Problem Relation Age of Onset    Hypertension Mother     High Cholesterol Mother     Elevated Lipids Mother     COPD Father     Heart Failure Brother     Diabetes Brother          REVIEW OF SYSTEMS  Review of Systems   Musculoskeletal: Positive for back pain. LLE paraesthesia           PHYSICAL EXAMINATION  Visit Vitals  /84   Pulse 96   Temp 98.3 °F (36.8 °C) (Oral)   Resp 20   Ht 6' (1.829 m)   Wt 277 lb (125.6 kg)   SpO2 97%   BMI 37.57 kg/m²       Pain Assessment  7/27/2020   Location of Pain Back;Leg   Location Modifiers Left   Severity of Pain 7   Quality of Pain Burning;Aching   Quality of Pain Comment -   Duration of Pain Persistent   Frequency of Pain Constant   Aggravating Factors -   Aggravating Factors Comment -   Limiting Behavior Yes   Relieving Factors -   Relieving Factors Comment -   Result of Injury No           Constitutional:  Well developed, well nourished, in no acute distress. Psychiatric: Affect and mood are appropriate. Integumentary: No rashes or abrasions noted on exposed areas. SPINE/MUSCULOSKELETAL EXAM    Lumbar spine:  No rash, ecchymosis, or gross obliquity. No fasciculations. No focal atrophy is noted. No pain with hip ROM. Full range of motion. No tenderness to palpation. No tenderness to palpation at the sciatic notch. SI joints non-tender. Trochanters non tender. Decreased sensation on the L in an S1 distribution.     Positive Straight Leg Raise on the Left. MOTOR:      Biceps  Triceps Deltoids Wrist Ext Wrist Flex Hand Intrin   Right 5/5 5/5 5/5 5/5 5/5 5/5   Left 5/5 5/5 5/5 5/5 5/5 5/5             Hip Flex  Quads Hamstrings Ankle DF EHL Ankle PF   Right 5/5 5/5 5/5 5/5 5/5 5/5   Left 5/5 5/5 5/5 +4/5 5/5 5/5        RADIOGRAPHS  Lumbar x-ray images taken on 4/30/2020 personally reviewed with patient:  FINDINGS: 5 views of the lumbar spine obtained.  Vertebral body heights  preserved. Disc space loss at L5-S1. Lower lumbar endplate spurring. No  significant listhesis. Lumbar lordosis maintained. No discrete pars defects  identified. Lower lumbar mild facet arthropathy. No acute fracture identified.     IMPRESSION  IMPRESSION:     No clearly acute findings. Notable lower lumbar degenerative changes. 20 minutes of face-to-face contact were spent with the patient during today's visit extensively discussing symptoms and treatment plan. All questions were answered. More than half of this visit today was spent on counseling.      Written by Clementine Kerr as dictated by Tony Castelan MD

## 2020-07-29 ENCOUNTER — HOSPITAL ENCOUNTER (OUTPATIENT)
Dept: PHYSICAL THERAPY | Age: 55
Discharge: HOME | End: 2020-07-29
Payer: MEDICARE

## 2020-07-29 PROCEDURE — 97161 PT EVAL LOW COMPLEX 20 MIN: CPT

## 2020-07-29 PROCEDURE — 97110 THERAPEUTIC EXERCISES: CPT

## 2020-07-29 NOTE — PROGRESS NOTES
[facility]  [address] [place] [address]  [phone] fax    Plan of Care/ Statement of Necessity for Physical Therapy Services    Patient name: Jaciel Urias Start of Care: 2020   Referral source: Ashlee Olivier MD : 1965    Medical Diagnosis: Radiculopathy, lumbar region [M54.16]  Payor: [place] MED[place]ARE / Plan: [place] MEDICARE PART A & B / Product Type: Medicare /  Onset Date: a month ago    Treatment Diagnosis: LBP, left radiculopathy    Prior Hospitalization: see medical history Provider#: [id number]   Medications: Verified on Patient summary List    Comorbidities: history of CVA x3, tobacco use   Prior Level of Function: Ind with ambulation, Ind with ADLs, yard work. The Plan of Care and following information is based on the information from the initial evaluation. Assessment/ key information: pt. Is a 54year old male c/o left LE pain that began about a month ago. He reports pain is constant and goes down his left leg to his big toe. He has increased pain with walking and at night. Denies changes in bowel and bladder symptoms. He presents with decreased lumbar AROM with most pain into flexion. He has no increased pain with extension, but did not centralize symptoms with repeated movements today. He has decreased left LE strength compared to right side. He reports weakness on left side from prior CVA, so difficulty to determine what his prior strength was. Positive neural tension testing. Pain with laying supine and prone which limited evaluation. 30 second sit to stand test was 4x. Skilled PT is medically necessary in order to improve radicular symptoms and strength for increased ease of ADLs and improve quality of life.      Evaluation Complexity History MEDIUM  Complexity : 1-2 comorbidities / personal factors will impact the outcome/ POC ; Examination MEDIUM Complexity : 3 Standardized tests and measures addressing body structure, function, activity limitation and / or participation in recreation  ;Presentation LOW Complexity : Stable, uncomplicated  ;Clinical Decision Making MEDIUM Complexity : FOTO score of 26-74  Overall Complexity Rating: LOW   Problem List: pain affecting function, decrease ROM, decrease strength, impaired gait/ balance, decrease ADL/ functional abilitiies, decrease activity tolerance, decrease flexibility/ joint mobility and decrease transfer abilities   Treatment Plan may include any combination of the following: Therapeutic exercise, Therapeutic activities, Neuromuscular re-education, Physical agent/modality, Gait/balance training, Manual therapy, Patient education, Self Care training, Functional mobility training and Home safety training  Patient / Family readiness to learn indicated by: asking questions  Persons(s) to be included in education: patient (P)  Barriers to Learning/Limitations: None  Patient Goal (s): to have less pain  Patient Self Reported Health Status: good  Rehabilitation Potential: good    Short Term Goals: To be accomplished in 1 weeks:  1. Patient will demonstrate compliance with HEP in order to improve improve lumbar AROM for increased ease of ADLs    Long Term Goals: To be accomplished in 4 weeks:  1. Patient will improve FOTO score by 13 points in order to demonstrate a significant improvement in function. 2. Patient will improve 30 second sit to stand test to 8x in order to increase ease of transfers at home. 3. Patient will improve improve lumbar flexion finger tip to floor to 45cm in order to increase ease of ADLs. 4. Patient will improve left great toe extension strength to 4/5 in order to increase ease of ambulation. Frequency / Duration: Patient to be seen 2 times per week for 4 weeks.     Patient/ Caregiver education and instruction: Diagnosis, prognosis, exercises   [x]  Plan of care has been reviewed with PTA    Certification Period: 7/29/20-8/28/20    Evelyne Valdez Koko, PT 7/29/2020 2:43 PM    ________________________________________________________________________    I certify that the above Therapy Services are being furnished while the patient is under my care. I agree with the treatment plan and certify that this therapy is necessary.     Physician's Signature:____________Date:_________TIME:________    ** Signature, Date and Time must be completed for valid certification **    Please sign and return to [facility]  [address] [place] [address]  [phone] fax

## 2020-07-29 NOTE — PROGRESS NOTES
PT DAILY TREATMENT NOTE/LUMBAR EVAL 10-18    Patient Name: Antoine Robin  Date:2020  : 1965  [x]  Patient  Verified  Payor: [place] MEDICARE / Plan: [place] MEDICARE PART A & B / Product Type: Medicare /    In time: 2:00  Out time: 2:31  Total Treatment Time (min): 31  Visit #: 1 of 8    Medicare/BCBS Only   Total Timed Codes (min):  8 1:1 Treatment Time:  31     Treatment Area: Radiculopathy, lumbar region [M54.16]  SUBJECTIVE  Pain Level (0-10 scale):  7/10  []constant []intermittent []improving []worsening []no change since onset    Any medication changes, allergies to medications, adverse drug reactions, diagnosis change, or new procedure performed?: [x] No    [] Yes (see summary sheet for update)  Subjective functional status/changes:       Mechanism of Injury:  Pt. Reports symptoms began about a month ago. Went to the hospital  Multiple time to get treated. Was doing yard work before hand and was doing a lot of up and down motions. Had a lot of difficulty with walking at first. Pain to toe is constant. Denies back surgery. 3 CVAs in the past with potentially left side weakness. No difficulty with bowel and bladder. Pain wakes up at night. Pain worsens with walking. More comfortable leaning to left side. Work Hx: not currently working  Living Situation: lives with friend. Ind with ADLs  Pt Goals: to have less pain and walk better.      OBJECTIVE/EXAMINATION    8 min Therapeutic Exercise:  [x] See flow sheet : HEP   Rationale: increase ROM and increase strength to improve the patients ability to increase ease of ADLs          With   [x] TE   [] TA   [] neuro   [] other: Patient Education: [x] Review HEP    [] Progressed/Changed HEP based on:   [] positioning   [] body mechanics   [] transfers   [] heat/ice application    [] other:      Physical Therapy Evaluation - Lumbar Spine ([facility])    OBJECTIVE    Gait:  [] Normal     [x] Abnormal: decreased weight shift to right side    Active Movements: [] N/A   [] Too acute   [] Other:  ROM % AROM % PROM Comments:pain, area   Forward flexion 40-60 59cm  pain   Extension 20-30 75%     SB right 20-30 50%     SB left 20-30 50%     Rotation right 5-10 50%     Rotation left 5-10 50%       Repeated Movements   Effects on present pain: produces (OR), abolishes (A), increases (incr), decreases (decr), centralizes (C), peripheral (PH), no effect (NE)   Pre-Test Sx Flexion Repeated Flexion Extension Repeated Extension Repeated SBL Repeated SBR   Sitting          Standing  inc inc ne ne     Lying      N/A N/A   Comments:  Side Glide:  Sustained passive positioning test:    Dural Mobility:  SLR Sitting: [] R    [] L    [] +    [] -  @ (degrees):           Supine: [] R    [x] L    [] +    [x] -  @ (degrees):   Slump Test: [] R    [x] L    [] +    [x] -  @ (degrees):   Prone Knee Bend: [] R    [] L    [] +    [] -     Palpation  [x] Min  [] Mod  [] Severe    Location: lumbar paraspinals  [] Min  [] Mod  [] Severe    Location:  [] Min  [] Mod  [] Severe    Location:    Strength   L(0-5) R (0-5) N/T   Hip Flexion (L1,2) 4- 4+ []   Knee Extension (L3,4) 4- 4+ []   Ankle Dorsiflexion (L4) 4- 4+ []   Great Toe Extension (L5) 4- 4+ []   Ankle Plantarflexion (S1) 4- 4+ []   Knee Flexion (S1,2) 4- 4+ []   Upper Abdominals   []   Lower Abdominals   []   Paraspinals   []   Back Rotators   []   Gluteus Abel   []   glute med 3 4- []     Other tests/comments:  30 second sit to stand test: 4x  Vertical compression test: negative  Unable to lay prone secondary to pain so some of evaluation was limited   He was educated on lumbar roll for sitting    Pain Level (0-10 scale) post treatment: 7/10    ASSESSMENT/Changes in Function:      [x]  See Plan of Care  []  See progress note/recertification  []  See Discharge Summary         Progress towards goals / Updated goals:  See POC    PLAN  []  Upgrade activities as tolerated     [x]  Continue plan of care  []  Update interventions per flow sheet []  Discharge due to:_  []  Other:_      Camilla Kline, PT 7/29/2020  1:59 PM

## 2020-08-04 ENCOUNTER — HOSPITAL ENCOUNTER (OUTPATIENT)
Dept: PHYSICAL THERAPY | Age: 55
Discharge: HOME | End: 2020-08-04
Payer: MEDICARE

## 2020-08-04 PROCEDURE — 97110 THERAPEUTIC EXERCISES: CPT

## 2020-08-04 PROCEDURE — 97140 MANUAL THERAPY 1/> REGIONS: CPT

## 2020-08-06 ENCOUNTER — HOSPITAL ENCOUNTER (OUTPATIENT)
Dept: PHYSICAL THERAPY | Age: 55
Discharge: HOME | End: 2020-08-06
Payer: MEDICARE

## 2020-08-06 PROCEDURE — 97110 THERAPEUTIC EXERCISES: CPT

## 2020-08-06 NOTE — PROGRESS NOTES
PT DAILY TREATMENT NOTE 10-18    Patient Name: Loy Melchor  Date:2020  : 1965  [x]  Patient  Verified  Payor: Eda Pace / Plan: [place] MEDICARE PART A & B / Product Type: Medicare /    In time: 12:50  Out time: 1:28  Total Treatment Time (min): 38  Visit #: 3 of 8    Medicare/BCBS Only   Total Timed Codes (min):  38 1:1 Treatment Time:  38       Treatment Area: Low back pain [M54.5]  Radiculopathy, lumbar region [M54.16]    SUBJECTIVE  Pain Level (0-10 scale): 8/10  Any medication changes, allergies to medications, adverse drug reactions, diagnosis change, or new procedure performed?: [x] No    [] Yes (see summary sheet for update)  Subjective functional status/changes:   [] No changes reported  Patient reports he has not had much relief since his last appt. He states he did not take as much of his medication today, he wanted to wait until after therapy; reporting he took double the prescribed amount prior to last session.      OBJECTIVE       38 min Therapeutic Exercise:  [] See flow sheet :   Rationale: increase ROM, increase strength to improve the patients ability to perform ADLs with ease, perform daily tasks and return to PLOF          With   [x] TE   [] TA   [] neuro   [] other: Patient Education: [x] Review HEP    [] Progressed/Changed HEP based on:   [x] positioning   [x] body mechanics   [] transfers   [] heat/ice application    [] other:      Other Objective/Functional Measures:   Prone lying with HOB elevated provided relief and centralization  Prone on elbows/pressups some initial relief, however pain began to peripheralize  Right side bend decreased pain on left; no left side bend attempted  1/2 prone hip extension reduced c/o pain    Pain Level (0-10 scale) post treatment: 7/10    ASSESSMENT/Changes in Function: Patient continues to present with fluctuations in pain; occasional centralization of pain with extension in various positions to possibly indicate some extension bias, however due to inconsistencies, no true directional preference proven. Will continue with core and hip strengthening and postural correction to improve progress towards pain reduction and return to PLOF. Patient will continue to benefit from skilled PT services to modify and progress therapeutic interventions, address functional mobility deficits, address ROM deficits, address strength deficits, analyze and address soft tissue restrictions, analyze and cue movement patterns, analyze and modify body mechanics/ergonomics and assess and modify postural abnormalities to attain remaining goals. [x]  See Plan of Care  []  See progress note/recertification  []  See Discharge Summary         Progress towards goals / Updated goals:  Short Term Goals: To be accomplished in 1 weeks:  1. Patient will demonstrate compliance with HEP in order to improve improve lumbar AROM for increased ease of ADLs MET  Long Term Goals: To be accomplished in 4 weeks:  1. Patient will improve FOTO score by 13 points in order to demonstrate a significant improvement in function. 2. Patient will improve 30 second sit to stand test to 8x in order to increase ease of transfers at home. 3. Patient will improve improve lumbar flexion finger tip to floor to 45cm in order to increase ease of ADLs. 4. Patient will improve left great toe extension strength to 4/5 in order to increase ease of ambulation.     PLAN  []  Upgrade activities as tolerated     [x]  Continue plan of care  []  Update interventions per flow sheet       []  Discharge due to:_  []  Other:_      DANA Block 8/6/2020  12:55 PM    Future Appointments   Date Time Provider Angelica Gaona   8/12/2020  2:15 PM Nydia Leon, PT [facility]   8/14/2020  9:00 AM [facility]o Alvarado PTA [facility]   8/18/2020 12:45 PM Pato Alvarado PTA [facility]   8/20/2020 12:00 PM Pato Alvarado PTA [facility]   8/26/2020 12:00 PM Nydia Leon, DONNA [facility] 8/28/2020  9:45 AM Sandeep Shaw, PT [facility]

## 2020-08-12 ENCOUNTER — HOSPITAL ENCOUNTER (OUTPATIENT)
Dept: PHYSICAL THERAPY | Age: 55
Discharge: HOME | End: 2020-08-12
Payer: MEDICARE

## 2020-08-12 PROCEDURE — 97110 THERAPEUTIC EXERCISES: CPT

## 2020-08-12 PROCEDURE — 97032 APPL MODALITY 1+ESTIM EA 15: CPT

## 2020-08-12 PROCEDURE — 97140 MANUAL THERAPY 1/> REGIONS: CPT

## 2020-08-12 PROCEDURE — 97014 ELECTRIC STIMULATION THERAPY: CPT

## 2020-08-12 NOTE — PROGRESS NOTES
PT DAILY TREATMENT NOTE 10-18    Patient Name: Toni Kingston  Date:2020  : 1965  [x]  Patient  Verified  Payor: Julio Stacy / Plan: [place] MED[place] A & B / Product Type: Medicare /    In time: 2:16  Out time: 3:15  Total Treatment Time (min): 59  Visit #: 4 of 8    Medicare/BCBS Only   Total Timed Codes (min):  59 1:1 Treatment Time:  59       Treatment Area: Low back pain [M54.5]  Radiculopathy, lumbar region [M54.16]    SUBJECTIVE  Pain Level (0-10 scale): 7/10  Any medication changes, allergies to medications, adverse drug reactions, diagnosis change, or new procedure performed?: [x] No    [] Yes (see summary sheet for update)  Subjective functional status/changes:   [] No changes reported  Pt. Reports he is still having a lot of pain. Pain is the worst in left back/hip but it does go down to his great toe.      OBJECTIVE    Modality rationale: decrease pain to improve the patients ability to increase ease of ADLs   Min Type Additional Details   15 [x] Estim:  [x]Unatt       []IFC  [x]Premod                        []Other:  [x]w/ice   []w/heat  Position: seated  Location: left lumbar    [] Estim: []Att    []TENS instruct  []NMES                    []Other:  []w/US   []w/ice   []w/heat  Position:  Location:    []  Traction: [] Cervical       []Lumbar                       [] Prone          []Supine                       []Intermittent   []Continuous Lbs:  [] before manual  [] after manual    []  Ultrasound: []Continuous   [] Pulsed                           []1MHz   []3MHz W/cm2:  Location:    []  Iontophoresis with dexamethasone         Location: [] Take home patch   [] In clinic    []  Ice     []  heat  []  Ice massage  []  Laser   []  Anodyne Position:  Location:    []  Laser with stim  []  Other:  Position:  Location:    []  Vasopneumatic Device Pressure:       [] lo [] med [] hi   Temperature: [] lo [] med [] hi   [x] Skin assessment post-treatment:  [x]intact []redness- no adverse reaction    []redness  adverse reaction:     36 min Therapeutic Exercise:  [x] See flow sheet :   Rationale: increase ROM and increase strength to improve the patients ability to increase ease of ADLs    8 min Manual Therapy:  STM/DTM to left lumbar paraspinals    Rationale: decrease pain, increase ROM and increase tissue extensibility to increase ease of ADLs          With   [x] TE   [] TA   [] neuro   [] other: Patient Education: [x] Review HEP    [] Progressed/Changed HEP based on:   [] positioning   [] body mechanics   [] transfers   [] heat/ice application    [] other:      Other Objective/Functional Measures:   No significant centralization with repeated lumbar extension and left side bend  Performed neural tension glides on right side secondary to hyperirritability   Increased symptoms with attempting to lay prone and side lying  No significant change with STM to left lumbar paraspinals. Significant difficulty with standing toe raises    Pain Level (0-10 scale) post treatment:  5/10    ASSESSMENT/Changes in Function: pt. Is making limited progress towards goals. He continues to have poor tolerance to most activities and has radicular symptoms to left great toe. He has an extension bias but is unable to centralize his symptoms. He is unable to tolerate laying on table for other exercises. Patient will continue to benefit from skilled PT services to modify and progress therapeutic interventions, address functional mobility deficits, address ROM deficits, address strength deficits, analyze and address soft tissue restrictions, analyze and cue movement patterns and analyze and modify body mechanics/ergonomics to attain remaining goals. Progress towards goals / Updated goals:  Short Term Goals: To be accomplished in 1 weeks:  1. Patient will demonstrate compliance with HEP in order to improve improve lumbar AROM for increased ease of ADLs MET    Long Term Goals: To be accomplished in 4 weeks:  1. Patient will improve FOTO score by 13 points in order to demonstrate a significant improvement in function. 2. Patient will improve 30 second sit to stand test to 8x in order to increase ease of transfers at home. 3. Patient will improve improve lumbar flexion finger tip to floor to 45cm in order to increase ease of ADLs. Not met: pt. Continues to have increased pain with flexion (8/12/20)  4. Patient will improve left great toe extension strength to 4/5 in order to increase ease of ambulation.     PLAN  []  Upgrade activities as tolerated     [x]  Continue plan of care  []  Update interventions per flow sheet       []  Discharge due to:_  []  Other:_      Jacy Jordan, PT 8/12/2020  7:14 AM    Future Appointments   Date Time Provider Angelica Gaona   8/12/2020  2:15 PM Vaishnavi Dan, PT [facility]   8/14/2020  9:00 AM Ke Goldberg, PTA [facility]   8/17/2020  7:00 AM [facility] MRI RM 1 [facility]   8/18/2020 12:45 PM eK Goldberg, PTA [facility]   8/20/2020 12:00 PM Ke Goldberg, PTA [facility]   8/26/2020 12:00 PM Vaishnavi Dan, PT [facility]   8/28/2020  9:45 AM Vaishnavi Dan, PT [facility]

## 2020-08-14 ENCOUNTER — HOSPITAL ENCOUNTER (OUTPATIENT)
Dept: PHYSICAL THERAPY | Age: 55
Discharge: HOME | End: 2020-08-14
Payer: MEDICARE

## 2020-08-14 PROCEDURE — 97014 ELECTRIC STIMULATION THERAPY: CPT

## 2020-08-14 PROCEDURE — 97110 THERAPEUTIC EXERCISES: CPT

## 2020-08-14 NOTE — PROGRESS NOTES
PT DAILY TREATMENT NOTE 10-18    Patient Name: Henrik Parrish  Date:2020  : 1965  [x]  Patient  Verified  Payor: [place] MEDICARE / Plan: [place] MEDICARE PART A & B / Product Type: Medicare /    In time: 9:00  Out time: 9:46  Total Treatment Time (min): 46  Visit #: 5 of 8    Medicare/BCBS Only   Total Timed Codes (min):  31 1:1 Treatment Time: 31       Treatment Area: Low back pain [M54.5]  Radiculopathy, lumbar region [M54.16]    SUBJECTIVE  Pain Level (0-10 scale): 3/10  Any medication changes, allergies to medications, adverse drug reactions, diagnosis change, or new procedure performed?: [x] No    [] Yes (see summary sheet for update)  Subjective functional status/changes:   [] No changes reported  Pt reports he goes Monday to get a MRI. He states today pain is only going to his left hip. He has a bad habit of bending forward to try to relieve his pain but he does note leaning back lessens the problem.       OBJECTIVE    Modality rationale: decrease pain to improve the patients ability to increase ease of ADLs   Min Type Additional Details   15 [x] Estim:  [x]Unatt       []IFC  [x]Premod                        []Other:  [x]w/ice   []w/heat  Position: seated  Location: left lumbar    [] Estim: []Att    []TENS instruct  []NMES                    []Other:  []w/US   []w/ice   []w/heat  Position:  Location:    []  Traction: [] Cervical       []Lumbar                       [] Prone          []Supine                       []Intermittent   []Continuous Lbs:  [] before manual  [] after manual    []  Ultrasound: []Continuous   [] Pulsed                           []1MHz   []3MHz W/cm2:  Location:    []  Iontophoresis with dexamethasone         Location: [] Take home patch   [] In clinic    []  Ice     []  heat  []  Ice massage  []  Laser   []  Anodyne Position:  Location:    []  Laser with stim  []  Other:  Position:  Location:    []  Vasopneumatic Device Pressure:       [] lo [] med [] hi   Temperature: [] lo [] med [] hi   [x] Skin assessment post-treatment:  [x]intact []redness- no adverse reaction    []redness  adverse reaction:     31 min Therapeutic Exercise:  [x] See flow sheet :   Rationale: increase ROM and increase strength to improve the patients ability to increase ease of ADLs          With   [x] TE   [] TA   [] neuro   [] other: Patient Education: [x] Review HEP    [] Progressed/Changed HEP based on:   [] positioning   [] body mechanics   [] transfers   [] heat/ice application    [] other:      Other Objective/Functional Measures:   ADALBERTO during session for centralization   Educated on l/s roll for sitting  Instructed in avoiding trunk bending for now to continue with centralization of symptoms  Unable to progress to prone prop due to increased symptoms to lower leg  pallof press increased symptoms down left leg  Needed assist for prone hip extension on the left but able to perform prone HSC  Most benefit with ice and estim at reducing pain from left hip    Pain Level (0-10 scale) post treatment: 0/10      ASSESSMENT/Changes in Function: Pt progressing with decreased pain and continues to be extension bias. He has decreased core strength and basic core strengthening in standing peripheralized symptoms. Pt is unable to perform prone prop yet without radicular symptoms but can lay prone and perform repeated extension in standing. Will continue to focus on improved posture in sitting and standing and also progress core/hip strength for decreased pain performing household chores and yardwork for his elderly mother. Progress towards goals / Updated goals:  Short Term Goals: To be accomplished in 1 weeks:  1. Patient will demonstrate compliance with HEP in order to improve improve lumbar AROM for increased ease of ADLs MET  Long Term Goals: To be accomplished in 4 weeks:  1. Patient will improve FOTO score by 13 points in order to demonstrate a significant improvement in function.    2. Patient will improve 30 second sit to stand test to 8x in order to increase ease of transfers at home. 3. Patient will improve improve lumbar flexion finger tip to floor to 45cm in order to increase ease of ADLs. Not met: pt. Continues to have increased pain with flexion (8/12/20)  4. Patient will improve left great toe extension strength to 4/5 in order to increase ease of ambulation.     PLAN  [x]  Upgrade activities as tolerated     [x]  Continue plan of care  []  Update interventions per flow sheet       []  Discharge due to:_  []  Other:_      Desma Patient, PTA 8/14/2020  7:14 AM    Future Appointments   Date Time Provider Angelica Gaona   8/14/2020  9:00 AM Laurie Hearing, PTA [facility]   8/17/2020  7:00 AM [facility] MRI RM 1 [facility]   8/18/2020 12:45 PM Laurie Hearing, PTA [facility]   8/20/2020 12:00 PM Laurie Hearing, PTA [facility]   8/26/2020 12:00 PM Danielle Favors, PT [facility]   8/28/2020  9:45 AM Danielle Favors, PT [facility]

## 2020-08-17 ENCOUNTER — HOSPITAL ENCOUNTER (OUTPATIENT)
Dept: MRI IMAGING | Age: 55
Discharge: HOME | End: 2020-08-17
Attending: PHYSICAL MEDICINE & REHABILITATION
Payer: MEDICARE

## 2020-08-17 DIAGNOSIS — M54.16 ACUTE LEFT LUMBAR RADICULOPATHY: ICD-10-CM

## 2020-08-17 PROCEDURE — 72148 MRI LUMBAR SPINE W/O DYE: CPT

## 2020-08-18 ENCOUNTER — HOSPITAL ENCOUNTER (OUTPATIENT)
Dept: PHYSICAL THERAPY | Age: 55
Discharge: HOME | End: 2020-08-18
Payer: MEDICARE

## 2020-08-18 PROCEDURE — 97110 THERAPEUTIC EXERCISES: CPT

## 2020-08-18 NOTE — PROGRESS NOTES
PT DAILY TREATMENT NOTE 10-18    Patient Name: Navarro Salinas  Date:2020  : 1965  [x]  Patient  Verified  Payor: [place] MEDICARE / Plan: [place] MEDICARE PART A & B / Product Type: Medicare /    In time: 12:50   Out time: 1:15  Total Treatment Time (min): 25  Visit #: 6 of 8    Medicare/BCBS Only   Total Timed Codes (min): 25 1:1 Treatment Time: 25       Treatment Area: Low back pain [M54.5]  Radiculopathy, lumbar region [M54.16]    SUBJECTIVE  Pain Level (0-10 scale): 2/10  Any medication changes, allergies to medications, adverse drug reactions, diagnosis change, or new procedure performed?: [x] No    [] Yes (see summary sheet for update)  Subjective functional status/changes:   [] No changes reported  Pt reports he thinks he likes the gabapentin better than lyrica so he is going to ask the MD to switch back. He got his MRI done yesterday. He states overall he is feeling better but still has weakness in his left leg. Pt requests to leave early today due to feeling tired. OBJECTIVE    25 min Therapeutic Exercise:  [x] See flow sheet :   Rationale: increase ROM and increase strength to improve the patients ability to increase ease of ADLs          With   [x] TE   [] TA   [] neuro   [] other: Patient Education: [x] Review HEP    [] Progressed/Changed HEP based on:   [] positioning   [] body mechanics   [] transfers   [] heat/ice application    [] other:      Other Objective/Functional Measures:   Able to perform prone prop without radicular symptoms today  Unable to perform prone hip extension due to weakness  Educated on prone glute/TA squeeze  Pt declined estim/ice today  Reviewed use of l/s roll for sitting for back support  Educated to not make changes to medication without MD guidance to ensure safe transition    Pain Level (0-10 scale) post treatment: 0/10      ASSESSMENT/Changes in Function: Pt progressing with centralizing symptoms from the left LE.  He is able to perform prone props as well now without radicular symptoms. He still has left hip extension weakness and decreased core strength. He also continues to forget and utilizes flexion versus extension in times of increased pain. Will continue to focus on extension bias with core/hip strengthening to maintain centralized symptoms for ease of performing ADLs and helping with his mother's chores. Progress towards goals / Updated goals:  Short Term Goals: To be accomplished in 1 weeks:  1. Patient will demonstrate compliance with HEP in order to improve improve lumbar AROM for increased ease of ADLs MET  Long Term Goals: To be accomplished in 4 weeks:  1. Patient will improve FOTO score by 13 points in order to demonstrate a significant improvement in function. 2. Patient will improve 30 second sit to stand test to 8x in order to increase ease of transfers at home. 3. Patient will improve improve lumbar flexion finger tip to floor to 45cm in order to increase ease of ADLs. Not met: pt. Continues to have increased pain with flexion (8/12/20)  4. Patient will improve left great toe extension strength to 4/5 in order to increase ease of ambulation.     PLAN  [x]  Upgrade activities as tolerated     [x]  Continue plan of care  []  Update interventions per flow sheet       []  Discharge due to:_  []  Other:_      Elida Bolden PTA 8/18/2020  7:14 AM    Future Appointments   Date Time Provider Angelica Gaona   8/18/2020 12:45 PM Lefty Alvarez PTA [facility]   8/20/2020 12:00 PM Sanchez Coates [facility]   8/26/2020 12:00 PM Zane Aguero, PT [facility]   8/28/2020  9:45 AM Alirio Bautista PTA [facility]

## 2020-08-20 ENCOUNTER — HOSPITAL ENCOUNTER (OUTPATIENT)
Dept: PHYSICAL THERAPY | Age: 55
Discharge: HOME | End: 2020-08-20
Payer: MEDICARE

## 2020-08-20 PROCEDURE — 97110 THERAPEUTIC EXERCISES: CPT

## 2020-08-20 NOTE — PROGRESS NOTES
PT DAILY TREATMENT NOTE 10-18    Patient Name: Any Loo  Date:2020  : 1965  [x]  Patient  Verified  Payor: Alicia Vidal / Plan: [place] MEDICARE PART A & B / Product Type: Medicare /    In time: 12:04   Out time: 12:40  Total Treatment Time (min): 36  Visit #: 7 of 8    Medicare/BCBS Only   Total Timed Codes (min): 36 1:1 Treatment Time: 30       Treatment Area: Low back pain [M54.5]  Radiculopathy, lumbar region [M54.16]    SUBJECTIVE  Pain Level (0-10 scale): 6/10   Any medication changes, allergies to medications, adverse drug reactions, diagnosis change, or new procedure performed?: [x] No    [] Yes (see summary sheet for update)  Subjective functional status/changes:   [] No changes reported  Pt reports took gabapentin at 6 A. M. and 12 P. M. today. He would like to get off all medication but hasn't made a follow up with his MD yet. He hasn't tried the l/s roll for sitting either. He reports burning in the left hip and lower leg down the side of the leg.      OBJECTIVE    36 min Therapeutic Exercise:  [x] See flow sheet :   Rationale: increase ROM and increase strength to improve the patients ability to increase ease of ADLs          With   [x] TE   [] TA   [] neuro   [] other: Patient Education: [x] Review HEP    [] Progressed/Changed HEP based on:   [] positioning   [] body mechanics   [] transfers   [] heat/ice application    [] other:      Other Objective/Functional Measures:   Increased radicular symptoms with standing exercises at the Branch machine  ADALBERTO centralized symptoms from the lower leg but stayed at the hip with less intensity  ADALBERTO to the left increased peripheralization  Able to perform prone prop and prone press up today without radicular symptoms   Educated on importance of making MD appt follow up  Educated on use of l/s roll for back support in sitting to maintain lordosis    Pain Level (0-10 scale) post treatment:  3/10    ASSESSMENT/Changes in Function: Pt continues to present with extension bias and is able to centralize symptoms from the lower leg with repeated extension in standing. He was able to progress to prone prop and prone press ups without peripheralization of symptoms. He continues with poor posture in sitting without use of l/s roll. Pt will continue to benefit from progression of extension based exercises to centralize symptoms for ease of performing ADLs with decreased pain. Progress towards goals / Updated goals:  Short Term Goals: To be accomplished in 1 weeks:  1. Patient will demonstrate compliance with HEP in order to improve improve lumbar AROM for increased ease of ADLs MET  Long Term Goals: To be accomplished in 4 weeks:  1. Patient will improve FOTO score by 13 points in order to demonstrate a significant improvement in function. 2. Patient will improve 30 second sit to stand test to 8x in order to increase ease of transfers at home. 3. Patient will improve improve lumbar flexion finger tip to floor to 45cm in order to increase ease of ADLs. Not met: pt. Continues to have increased pain with flexion (8/12/20)  4. Patient will improve left great toe extension strength to 4/5 in order to increase ease of ambulation.     PLAN  [x]  Upgrade activities as tolerated     [x]  Continue plan of care  []  Update interventions per flow sheet       []  Discharge due to:_  []  Other:_      Gildardo Valencia PTA 8/20/2020  7:14 AM    Future Appointments   Date Time Provider Angelica Gaona   8/20/2020 12:00 PM Makayla Granado [facility]   8/26/2020 12:00 PM Deborah Nicole PT [facility]   8/28/2020  9:45 AM Jessica Moore PTA [facility]

## 2020-08-26 ENCOUNTER — HOSPITAL ENCOUNTER (OUTPATIENT)
Dept: PHYSICAL THERAPY | Age: 55
Discharge: HOME | End: 2020-08-26
Payer: MEDICARE

## 2020-08-26 PROCEDURE — 97110 THERAPEUTIC EXERCISES: CPT

## 2020-08-26 NOTE — PROGRESS NOTES
[facility] [employer] [facility]  [address] [place] [address]  [phone] fax    Continued Plan of Care/ Re-certification for Physical Therapy Services    Patient name: Katelyn Taylor Start of Care: 2020   Referral source: Caitlin Kinney MD : 1965               Medical Diagnosis: Radiculopathy, lumbar region [M54.16]  Payor: [place] MEDICARE / Plan: VA MEDICARE PART A & B / Product Type: Medicare /  Onset Date: a month ago               Treatment Diagnosis: LBP, left radiculopathy    Prior Hospitalization: see medical history Provider#: [id number]   Medications: Verified on Patient summary List    Comorbidities: history of CVA x3, tobacco use   Prior Level of Function: Ind with ambulation, Ind with ADLs, yard work. Visits from Start of Care: 8    Missed Visits: 0    The Plan of Care and following information is based on the patient's current status:  Goal: Patient will improve FOTO score by 13 points in order to demonstrate a significant improvement in function. Status at last note/certification: 46  Current Status: not met    Goal: Patient will improve 30 second sit to stand test to 8x in order to increase ease of transfers at home. Status at last note/certification: 4x  Current Status: not met    Goal:  Patient will improve improve lumbar flexion finger tip to floor to 45cm in order to increase ease of ADLs.    Status at last note/certification: 59 cm  Current Status: not met    Goal: Patient will improve left great toe extension strength to 4/5 in order to increase ease of ambulation  Status at last note/certification: 4-/5  Current Status: not met    Key functional changes: improving sit to stand transfers, improving lumbar AROM    Problems/ barriers to goal attainment: none     Problem List: pain affecting function, decrease ROM, decrease strength, impaired gait/ balance, decrease ADL/ functional abilitiies, decrease activity tolerance, decrease flexibility/ joint mobility and decrease transfer abilities    Treatment Plan: Therapeutic exercise, Therapeutic activities, Neuromuscular re-education, Physical agent/modality, Gait/balance training, Manual therapy, Patient education, Self Care training and Functional mobility training     Patient Goal (s) has been updated and includes: to have less pain     Goals for this certification period to be accomplished in 4 weeks:  1. Patient will improve FOTO score by 13 points in order to demonstrate a significant improvement in function. 2. Patient will improve 30 second sit to stand test to 8x in order to increase ease of transfers at home. 3. Patient will improve improve lumbar flexion finger tip to floor to 45cm in order to increase ease of ADLs. 4. Patient will improve left great toe extension strength to 4/5 in order to increase ease of ambulation. Frequency / Duration: Patient to be seen 2 times per week for 4 weeks:    Assessment / Recommendations: pt. Is progressing slowly towards goals. He continues to have high pain levels decreased left great toe strength at 4-/5. He has less symptoms into his lower leg but increased pain into left hip. He continues to have extension bias overall to centralize symptoms but his symptoms and relief fluctuate with exercises. 30 second sit to stand test improved to 7x. FOTO score improved to 54 points. Lumbar flexion AROM continues to be limited at 51cm. Skilled PT is medically necessary in order to continue to centralize symptoms and improve strength for increased ease of ADLs and improved quality of life. Certification Period: 8/26/20-9/25/20    Leslee Alciea, PT 8/26/2020 12:49 PM    ________________________________________________________________________  I certify that the above Therapy Services are being furnished while the patient is under my care.  I agree with the treatment plan and certify that this therapy is necessary. [] I have read the above and request that my patient continue as recommended.   [] I have read the above report and request that my patient continue therapy with the following changes/special instructions: _______________________________________  [] I have read the above report and request that my patient be discharged from therapy    Physician's Signature:____________Date:_________TIME:________    ** Signature, Date and Time must be completed for valid certification **    Please sign and return to [facility] [employer] [facility]  [address] [place] [address]  [phone] fax

## 2020-08-26 NOTE — PROGRESS NOTES
PT DAILY TREATMENT NOTE 10-18    Patient Name: Tate Grant  Date:2020  : 1965  [x]  Patient  Verified  Payor: [place] MEDICARE / Plan: [place] MEDICARE PART A & B / Product Type: Medicare /    In time: 12:05  Out time: 12:43  Total Treatment Time (min): 38  Visit #: 8 of 8    Medicare/BCBS Only   Total Timed Codes (min):  38 1:1 Treatment Time:  38       Treatment Area: Low back pain [M54.5]  Radiculopathy, lumbar region [M54.16]    SUBJECTIVE  Pain Level (0-10 scale):  3/10  Any medication changes, allergies to medications, adverse drug reactions, diagnosis change, or new procedure performed?: [x] No    [] Yes (see summary sheet for update)  Subjective functional status/changes:   [] No changes reported  Pt. Reports he is doing ok today but his pain continues to fluctuate. He reports no significant change in symptoms since [facility]. OBJECTIVE    38 min Therapeutic Exercise:  [x] See flow sheet :   Rationale: increase ROM and increase strength to improve the patients ability to increase ease of ADLs          With   [x] TE   [] TA   [] neuro   [] other: Patient Education: [x] Review HEP    [] Progressed/Changed HEP based on:   [] positioning   [] body mechanics   [] transfers   [] heat/ice application    [] other:      Other Objective/Functional Measures: FOTO: 54 points  30 second sit to stand test: 7x  Lumbar flexion AROM: 51 cm  Left great toe extension strength: right: 5/5 left: 4-/5   Pt.  Refused prone exercises today  Reports significant increase in left hip pain with rows  Reports increased hip pain with seated UE lifts    Pain Level (0-10 scale) post treatment: 7/10    ASSESSMENT/Changes in Function:       []  See Plan of Care  [x]  See progress note/recertification  []  See Discharge Summary         Progress towards goals / Updated goals:  See progress note    PLAN  []  Upgrade activities as tolerated     [x]  Continue plan of care  []  Update interventions per flow sheet       []  Discharge due to:_  []  Other:_      Jayc Jordan, PT 8/26/2020  7:12 AM    Future Appointments   Date Time Provider Angelica Gaona   8/26/2020 12:00 PM Vaishnavi Dan [place] [facility]   8/28/2020  9:45 AM Ke Goldberg [id number]   9/10/2020  3:15 PM General Jose  [address] [id number]

## 2020-08-28 ENCOUNTER — HOSPITAL ENCOUNTER (OUTPATIENT)
Dept: PHYSICAL THERAPY | Age: 55
Discharge: HOME | End: 2020-08-28
Payer: MEDICARE

## 2020-08-28 PROCEDURE — 97110 THERAPEUTIC EXERCISES: CPT

## 2020-08-28 NOTE — PROGRESS NOTES
PT DAILY TREATMENT NOTE 10-18    Patient Name: Toni Kingston  Date:2020  : 1965  [x]  Patient  Verified  Payor: Julio Stacy / Plan: [place] MEDICARE PART A & B / Product Type: Medicare /    In time: 9:49  Out time: 10:22  Total Treatment Time (min): 33  Visit #: 1 of 8    Medicare/BCBS Only   Total Timed Codes (min):  33 1:1 Treatment Time: 33       Treatment Area: Low back pain [M54.5]  Radiculopathy, lumbar region [M54.16]    SUBJECTIVE  Pain Level (0-10 scale):  3/10  Any medication changes, allergies to medications, adverse drug reactions, diagnosis change, or new procedure performed?: [x] No    [] Yes (see summary sheet for update)  Subjective functional status/changes:   [] No changes reported  Pt reports he took gabapentin at 6 this morning. He states pain on the side of his left hip and down his leg to about middle of the lower leg. He reports that leaning back continues to lessen his symptoms but not fully resolve them. He reports having the same pain about 10 years ago but resolved with injections in the back. He notes likely needing more core strength but doesn't want to do it today. He would like to hold PT until his MD follow up on 9/10/20.       OBJECTIVE    33 min Therapeutic Exercise:  [x] See flow sheet :   Rationale: increase ROM and increase strength to improve the patients ability to increase ease of ADLs          With   [x] TE   [] TA   [] neuro   [] other: Patient Education: [x] Review HEP    [] Progressed/Changed HEP based on:   [] positioning   [] body mechanics   [] transfers   [] heat/ice application    [] other:      Other Objective/Functional Measures:   Reviewed extension progression prone, prone props, prone press ups  Decreased symptoms with extension to lateral thigh  Upon palpation to left hip pt had TTP over the greater trochanter with swelling present  Educated on ice use for inflammation and hip strengthening to address  Instructed pt to continue to be aware of Sitting posture and reducing flexion moments to help with continued centralization of symptoms  Will call pt post MD appt on 9/10/20 to see if he needs continuation of PT or DC    Pain Level (0-10 scale) post treatment: 2/10    ASSESSMENT/Changes in Function:  Pt overall is progressing with extension bias with improved strength and centralizing symptoms. He continues with decreased core and hip strength which has caused increased tenderness and swelling to the greater trochanter bursa. He is compliant with extension exercises and wishes to hold on PT at this time until his MD appt on 9/10/20. Goals for this certification period to be accomplished in 4 weeks:  1. Patient will improve FOTO score by 13 points in order to demonstrate a significant improvement in function. 2. Patient will improve 30 second sit to stand test to 8x in order to increase ease of transfers at home. 3. Patient will improve improve lumbar flexion finger tip to floor to 45cm in order to increase ease of ADLs. 4. Patient will improve left great toe extension strength to 4/5 in order to increase ease of ambulation.     PLAN  [x]  Upgrade activities as tolerated     [x]  Continue plan of care  []  Update interventions per flow sheet       []  Discharge due to:_  []  Other:_      Sheri Olivier, PTA 8/28/2020  7:12 AM    Future Appointments   Date Time Provider Angelica Gaona   8/28/2020  9:45 [facility]   9/10/2020  3:15 PM Reynaldo Turcios MD [facility]

## 2020-09-10 ENCOUNTER — OFFICE VISIT (OUTPATIENT)
Dept: ORTHOPEDIC SURGERY | Age: 55
End: 2020-09-10

## 2020-09-10 VITALS
HEART RATE: 97 BPM | TEMPERATURE: 97.8 F | DIASTOLIC BLOOD PRESSURE: 77 MMHG | OXYGEN SATURATION: 94 % | WEIGHT: 271.2 LBS | SYSTOLIC BLOOD PRESSURE: 113 MMHG | BODY MASS INDEX: 36.78 KG/M2

## 2020-09-10 DIAGNOSIS — M54.16 LUMBAR RADICULOPATHY: Primary | ICD-10-CM

## 2020-09-10 NOTE — PROGRESS NOTES
[address]  [address]  [place]  Phone: [phone]  Fax: [phone]        Negrito Hylton  : 1965  PCP: Santana Rossi MD  9/10/2020    PROGRESS NOTE      HISTORY OF PRESENT ILLNESS  Antoine Robin is a 54 y.o. male who was seen as a new patient 2020 with c/o hx of stroke(speech deficit) c/o left low leg and low back pain x 4 weeks. He had been doing yard work planting bulbs for a few days prior to his symptoms. Since onset, it worsened despite having gone to the ED twice. None of the medications helped including medrol, muscle relaxers, NSAIDS, tylenol. He had constant symptoms that were provoked mainly by standing and walking. Sitting and laying were positions of comfort. The symptoms ran from the buttock to the side of the leg and top of the foot. His strength was intact, and he had no sensory deficit at the time. He continued to have low back pain radiating into the LLE into the top of the foot. He did not find improvement from a Prednisone dose pack. He did not see much benefit from Gabapentin 900 mg TID. Pt returned with increased symptoms of low back pain radiating into the LLE. He found some benefit initially from Lyrica 225 mg BID but noted that it did not last very long.        Antoine Robin comes in to the office today for f/u. Pt has been intolerant to [address] secondary to vomiting, blurry vision. He attended PT (-2020; [facility] [facility]) with some benefit. Lumbar spine MRI dated 2020 reviewed. Per report, L5-S1 demonstrates degenerative changes and left larger than right herniations into the neuroforamina compression of the left L5 nerve root. Active inflammation around the L5-S1 disc space. Active inflammation around the left L4-5 facet synovial lining. Pain Score: 4/10.     Treatments patient has tried:  Physical Therapy - yes   Medications - Gabapentin, Prednisone, LYRICA, MDP, muscle relaxants, NSAIDs  Injections - pending    PmHx: stroke (speech deficits)    ASSESSMENT  This is a 54year-old male with acute low back pain radiating into the LLE. His symptoms are likely due to an acute left L5/S1 radiculopathy. He had a positive SLR, weakness with ankle PF, and decreased sensation in an S1 distribution on the L.     PLAN  1. Left L5 TFESI. Pt will f/u in 2-4 weeks after injection or sooner as needed. Diagnoses and all orders for this visit:    1. Lumbar radiculopathy  -     SCHEDULE SURGERY        PAST MEDICAL HISTORY   Past Medical History:   Diagnosis Date    Anemia     Hypertension     Stroke [facility])        Past Surgical History:   Procedure Laterality Date    COLONOSCOPY     . MEDICATIONS    Current Outpatient Medications   Medication Sig Dispense Refill    metFORMIN ER (GLUCOPHAGE XR) 500 mg tablet Take 1,000 mg by mouth daily.  rosuvastatin (CRESTOR) 40 mg tablet Take 1 Tab by mouth nightly.  pregabalin (LYRICA) 300 mg capsule Take 1 Cap by mouth two (2) times a day. Max Daily Amount: 600 mg. 60 Cap 2    traMADoL (ULTRAM) 50 mg tablet Take  mg by mouth every eight (8) hours as needed.  lidocaine (Lidoderm) 5 % Apply patch to the affected area for 12 hours a day and remove for 12 hours a day. 30 Patch 0    gabapentin (Neurontin) 300 mg capsule Take 1 Cap by mouth three (3) times daily. Max Daily Amount: 900 mg. (Patient not taking: Reported on 7/27/2020) 90 Cap 5    acetaminophen (Tylenol Extra Strength) 500 mg tablet Take 2 Tabs by mouth every six (6) hours as needed for Pain. 20 Tab 0    atorvastatin (LIPITOR) 80 mg tablet Take 80 mg by mouth daily.             ALLERGIES  Allergies   Allergen Reactions    Codeine Other (comments)     VOMITING          SOCIAL HISTORY    Social History     Socioeconomic History    Marital status:      Spouse name: Not on file    Number of children: Not on file    Years of education: Not on file    Highest education level: Not on file   Tobacco Use    Smoking status: Current Every Day Smoker     Packs/day: 1.00     Years: 15.00     Pack years: 15.00    Smokeless tobacco: Current User   Substance and Sexual Activity    Alcohol use: Not Currently     Alcohol/week: 0.8 standard drinks     Types: 1 Cans of beer per week     Comment: states that he drinks 3 times per day    Drug use: No    Sexual activity: Not Currently   Social History Narrative    ** Merged History Encounter **            FAMILY HISTORY  Family History   Problem Relation Age of Onset    Hypertension Mother     High Cholesterol Mother     Elevated Lipids Mother     COPD Father     Heart Failure Brother     Diabetes Brother          REVIEW OF SYSTEMS  Review of Systems   Musculoskeletal: Positive for back pain. LLE paraesthesia          PHYSICAL EXAMINATION  There were no vitals taken for this visit. Pain Assessment  7/27/2020   Location of Pain Back;Leg   Location Modifiers Left   Severity of Pain 7   Quality of Pain Burning;Aching   Quality of Pain Comment -   Duration of Pain Persistent   Frequency of Pain Constant   Aggravating Factors -   Aggravating Factors Comment -   Limiting Behavior Yes   Relieving Factors -   Relieving Factors Comment -   Result of Injury No           Constitutional:  Well developed, well nourished, in no acute distress. Psychiatric: Affect and mood are appropriate. Integumentary: No rashes or abrasions noted on exposed areas. SPINE/MUSCULOSKELETAL EXAM    Lumbar spine:  No rash, ecchymosis, or gross obliquity. No fasciculations. No focal atrophy is noted. No pain with hip ROM. Full range of motion. No tenderness to palpation. No tenderness to palpation at the sciatic notch. SI joints non-tender. Trochanters non tender.     Decreased sensation on the L in an S1 distribution.     Positive Straight Leg Raise on the Left.      MOTOR:      Biceps  Triceps Deltoids Wrist Ext Wrist Flex Hand Intrin   Right 5/5 5/5 5/5 5/5 5/5 5/5   Left 5/5 5/5 5/5 5/5 5/5 5/5             Hip Flex  Quads Hamstrings Ankle DF EHL Ankle PF   Right 5/5 5/5 5/5 5/5 5/5 5/5   Left 5/5 5/5 5/5 5/5 5/5 5/5     RADIOGRAPHS  Lumbar MRI images taken on 8/27/2020 personally reviewed with patient:  Alignment: Intact lordosis  Vertebral body height: Normal  Marrow signal: Unremarkable  Disc spaces: Preserved height and signal intensity  Conus: Terminates at T12-L1     Axial imaging correlation:     T11-12: There is mild signal loss reflecting some early intradiscal degenerative  change     L1-2: Patent canal and foramina.     L2-3: Patent canal and foramina.     L3-4: Signal loss is identified within the disc space. Disc space is not  narrowed. Central canal neuroforamina bilaterally patent. L4-5: Mild signal loss is identified there is a small central protrusion-type  Herniation. There is some increased signal in the synovium of the left L4-5 disc space not seen on adjacent levels consistent with some active inflammation of the synovial lining here may be the most significant pain generator on this exam.     Left and right neuroforamina are spared.     L5-S1: moderate signal loss mild narrowing of the disks present     Incidental anterior herniation and osteophytic formation also identified     Endplates demonstrate decreased signal T1 and increased on T2 consistent with Modic endplate changes reflecting active inflammatory change     The central canal is intact     The left neuroforamina demonstrates herniation osteophyte extending into the  neuroforamina. In addition there are some mild facet arthropathy. These changes appear to be compressing the exiting L5 root on the more lateral sagittal cuts. See image 4 series 3.  These changes do not extend into the central canal to cause mass effect on the exiting left S1 nerve root     Right neuroforamina demonstrates similar osteophyte herniation call, defect  however now smaller. Defect does touch inferior surface of the exiting right L5  nerve root. Nerve does not appear compressed or edematous.      Other structures: Unremarkable.        IMPRESSION  IMPRESSION:     L5-S1 demonstrates degenerative changes and left larger than right herniations  into the neuroforamina compression of the left L5 nerve root  Active inflammation around the L5-S1 disc space  Active inflammation around the left L4-5 facet synovial lining    Lumbar x-ray images taken on 4/30/2020 personally reviewed with patient:  FINDINGS: 5 views of the lumbar spine obtained. Vertebral body heights  preserved. Disc space loss at L5-S1. Lower lumbar endplate spurring. No  significant listhesis. Lumbar lordosis maintained. No discrete pars defects  identified. Lower lumbar mild facet arthropathy. No acute fracture identified.     IMPRESSION  IMPRESSION:     No clearly acute findings. Notable lower lumbar degenerative changes. 7 minutes of face-to-face contact were spent with the patient during today's visit extensively discussing symptoms and treatment plan. All questions were answered. More than half of this visit today was spent on counseling.      Written by Luisa Handy as dictated by Thiago Vargas MD

## 2020-09-10 NOTE — H&P (VIEW-ONLY)
[address] 
[address], [address] [place], [address] [place] [address] Street Phone: [phone] Fax: [phone] Reba Childress : 1965 PCP: Nicholos Gaucher, MD 
9/10/2020 PROGRESS NOTE HISTORY OF PRESENT ILLNESS Trudy Cuenca is a 54 y.o. male who was seen as a new patient 2020 with c/o hx of stroke(speech deficit) c/o left low leg and low back pain x 4 weeks. He had been doing yard work planting bulbs for a few days prior to his symptoms. Since onset, it worsened despite having gone to the ED twice. None of the medications helped including medrol, muscle relaxers, NSAIDS, tylenol. He had constant symptoms that were provoked mainly by standing and walking. Sitting and laying were positions of comfort. The symptoms ran from the buttock to the side of the leg and top of the foot. His strength was intact, and he had no sensory deficit at the time. He continued to have low back pain radiating into the LLE into the top of the foot. He did not find improvement from a Prednisone dose pack. He did not see much benefit from Gabapentin 900 mg TID. Pt returned with increased symptoms of low back pain radiating into the LLE. He found some benefit initially from Lyrica 225 mg BID but noted that it did not last very long.  
  
 
Trudy Cuenca comes in to the office today for f/u. Pt has been intolerant to [address] secondary to vomiting, blurry vision. He attended PT (-2020; [facility]) with some benefit. Lumbar spine MRI dated 2020 reviewed. Per report, L5-S1 demonstrates degenerative changes and left larger than right herniations into the neuroforamina compression of the left L5 nerve root. Active inflammation around the L5-S1 disc space. Active inflammation around the left L4-5 facet synovial lining. Pain Score: 4/10. Treatments patient has tried: 
Physical Therapy - yes  Medications - Gabapentin, Prednisone, LYRICA, MDP, muscle relaxants, NSAIDs Injections - pending PmHx: stroke (speech deficits) ASSESSMENT This is a 54year-old male with acute low back pain radiating into the LLE. His symptoms are likely due to an acute left L5/S1 radiculopathy. He had a positive SLR, weakness with ankle PF, and decreased sensation in an S1 distribution on the L.  PLAN 1. Left L5 TFESI. Pt will f/u in 2-4 weeks after injection or sooner as needed. Diagnoses and all orders for this visit: 1. Lumbar radiculopathy 
-     SCHEDULE SURGERY 
 
 
 
PAST MEDICAL HISTORY Past Medical History:  
Diagnosis Date  Anemia  Hypertension  Stroke ([facility] [address] Past Surgical History:  
Procedure Laterality Date  [facility] Current Outpatient Medications Medication Sig Dispense Refill  metFORMIN ER (GLUCOPHAGE XR) 500 mg tablet Take 1,000 mg by mouth daily.  rosuvastatin (CRESTOR) 40 mg tablet Take 1 Tab by mouth nightly.  pregabalin (LYRICA) 300 mg capsule Take 1 Cap by mouth two (2) times a day. Max Daily Amount: 600 mg. 60 Cap 2  
 traMADoL (ULTRAM) 50 mg tablet Take  mg by mouth every eight (8) hours as needed.  lidocaine (Lidoderm) 5 % Apply patch to the affected area for 12 hours a day and remove for 12 hours a day. 30 Patch 0  
 gabapentin (Neurontin) 300 mg capsule Take 1 Cap by mouth three (3) times daily. Max Daily Amount: 900 mg. (Patient not taking: Reported on 7/27/2020) 90 Cap 5  
 acetaminophen (Tylenol Extra Strength) 500 mg tablet Take 2 Tabs by mouth every six (6) hours as needed for Pain. 20 Tab 0  
 atorvastatin (LIPITOR) 80 mg tablet Take 80 mg by mouth daily. ALLERGIES Allergies Allergen Reactions  Codeine Other (comments) VOMITING  
    
 
SOCIAL HISTORY Social History Socioeconomic History  Marital status:  Spouse name: Not on file  Number of children: Not on file  Years of education: Not on file  Highest education level: Not on file Tobacco Use  Smoking status: Current Every Day Smoker Packs/day: 1.00 Years: 15.00 Pack years: 15.00  Smokeless tobacco: Current User Substance and Sexual Activity  Alcohol use: Not Currently Alcohol/week: 0.8 standard drinks Types: 1 Cans of beer per week Comment: states that he drinks 3 times per day  Drug use: No  
 Sexual activity: Not Currently Social History Narrative ** Merged History Encounter ** FAMILY HISTORY Family History Problem Relation Age of Onset  Hypertension Mother  High Cholesterol Mother  Elevated Lipids Mother  COPD Father  Heart Failure Brother  Diabetes Brother REVIEW OF SYSTEMS Review of Systems Musculoskeletal: Positive for back pain. LLE paraesthesia PHYSICAL EXAMINATION There were no vitals taken for this visit. Pain Assessment  7/27/2020 Location of Pain Back;Leg Location Modifiers Left Severity of Pain 7 Quality of Pain Burning;Aching Quality of Pain Comment - Duration of Pain Persistent Frequency of Pain Constant Aggravating Factors - Aggravating Factors Comment - Limiting Behavior Yes Relieving Factors - Relieving Factors Comment - Result of Injury No  
 
 
 
 
Constitutional:  Well developed, well nourished, in no acute distress. Psychiatric: Affect and mood are appropriate. Integumentary: No rashes or abrasions noted on exposed areas. SPINE/MUSCULOSKELETAL EXAM 
 
Lumbar spine: No rash, ecchymosis, or gross obliquity. No fasciculations. No focal atrophy is noted. No pain with hip ROM. Full range of motion. No tenderness to palpation. No tenderness to palpation at the sciatic notch. SI joints non-tender.   
Trochanters non tender. 
  
Decreased sensation on the L in an S1 distribution. 
  
 Positive Straight Leg Raise on the Left. MOTOR:   
  Biceps  Triceps Deltoids Wrist Ext Wrist Flex Hand Intrin Right 5/5 5/5 5/5 5/5 5/5 5/5 Left 5/5 5/5 5/5 5/5 5/5 5/5 Hip Flex  Quads Hamstrings Ankle DF EHL Ankle PF Right 5/5 5/5 5/5 5/5 5/5 5/5 Left 5/5 5/5 5/5 5/5 5/5 5/5 RADIOGRAPHS Lumbar MRI images taken on 8/27/2020 personally reviewed with patient: 
Alignment: Intact lordosis Vertebral body height: Normal 
Marrow signal: Unremarkable Disc spaces: Preserved height and signal intensity Conus: Terminates at T12-L1 
  
Axial imaging correlation: 
  
T11-12: There is mild signal loss reflecting some early intradiscal degenerative 
change 
  
L1-2: Patent canal and foramina. 
  
L2-3: Patent canal and foramina. 
  
L3-4: Signal loss is identified within the disc space. Disc space is not 
narrowed. Central canal neuroforamina bilaterally patent. L4-5: Mild signal loss is identified there is a small central protrusion-type Herniation. There is some increased signal in the synovium of the left L4-5 disc space not seen on adjacent levels consistent with some active inflammation of the synovial lining here may be the most significant pain generator on this exam. 
  
Left and right neuroforamina are spared. 
  
L5-S1: moderate signal loss mild narrowing of the disks present 
  
Incidental anterior herniation and osteophytic formation also identified 
  
Endplates demonstrate decreased signal T1 and increased on T2 consistent with Modic endplate changes reflecting active inflammatory change 
  The central canal is intact 
  
The left neuroforamina demonstrates herniation osteophyte extending into the 
neuroforamina. In addition there are some mild facet arthropathy. These changes appear to be compressing the exiting L5 root on the more lateral sagittal cuts. See image 4 series 3. These changes do not extend into the central canal to cause mass effect on the exiting left S1 nerve root   
Right neuroforamina demonstrates similar osteophyte herniation call, defect 
however now smaller. Defect does touch inferior surface of the exiting right L5 
nerve root. Nerve does not appear compressed or edematous. 
   
Other structures: Unremarkable. 
  
  
IMPRESSION IMPRESSION: 
  
L5-S1 demonstrates degenerative changes and left larger than right herniations 
into the neuroforamina compression of the left L5 nerve root Active inflammation around the L5-S1 disc space Active inflammation around the left L4-5 facet synovial lining Lumbar x-ray images taken on 4/30/2020 personally reviewed with patient: 
FINDINGS: 5 views of the lumbar spine obtained. Vertebral body heights 
preserved. Disc space loss at L5-S1. Lower lumbar endplate spurring. No 
significant listhesis. Lumbar lordosis maintained. No discrete pars defects 
identified. Lower lumbar mild facet arthropathy. No acute fracture identified. 
  
IMPRESSION IMPRESSION: 
  
No clearly acute findings. Notable lower lumbar degenerative changes. 7 minutes of face-to-face contact were spent with the patient during today's visit extensively discussing symptoms and treatment plan. All questions were answered. More than half of this visit today was spent on counseling.   
 
Written by Tim Scott as dictated by Cora Coates MD

## 2020-09-21 NOTE — PROGRESS NOTES
[facility]  [address] [facility]  [phone] fax    Physical Therapy Discharge Summary    Patient name: [facility]: 2020   Referral source: Alcantara, Herminia Runner, MD : 1965               Medical Diagnosis: Radiculopathy, lumbar region [M54.16]  Payor: [place] MED[place] / Plan: [place] [employer] / Product Type: Medicare /  Onset Date: a month ago               Treatment Diagnosis: LBP, left radiculopathy    Prior Hospitalization: see medical history Provider#: [id number]   Medications: Verified on Patient summary List    Comorbidities: history of CVA x3, tobacco use   Prior Level of Function: Ind with ambulation, Ind with ADLs, yard work.       Visits from Start of Care: 9    Missed Visits: 0    Reporting Period : 20 to 20    Summary of Care:  Goal:  Patient will improve FOTO score by 13 points in order to demonstrate a significant improvement in function. Status at last note/certification: 54  Status at discharge: not met    Goal: Patient will improve 30 second sit to stand test to 8x in order to increase ease of transfers at home. Status at last note/certification: 7x  Status at discharge: not met    Goal: Patient will improve improve lumbar flexion finger tip to floor to 45cm in order to increase ease of ADLs. Status at last note/certification: 51 cm  Status at discharge: not met    Goal: Patient will improve left great toe extension strength to 4/5 in order to increase ease of ambulation. Status at last note/certification: 4-/5  Status at discharge: not met    Pt. Was seen for 1 follow up visit after last progress note and then did not return to PT. Goals were unable to be re-assessed secondary to unplanned D/C. Per last progress note \" pt. Is progressing slowly towards goals. He continues to have high pain levels decreased left great toe strength at 4-/5.  He has less symptoms into his lower leg but increased pain into left hip. He continues to have extension bias overall to centralize symptoms but his symptoms and relief fluctuate with exercises. 30 second sit to stand test improved to 7x. FOTO score improved to 54 points. Lumbar flexion AROM continues to be limited at 51cm. \"      ASSESSMENT/RECOMMENDATIONS:  [x]Discontinue therapy: []Patient has reached or is progressing toward set goals      [x]Patient is non-compliant or has abdicated      []Due to lack of appreciable progress towards set goals    Mayito Broderick, PT 9/21/2020 9:15 AM

## 2020-09-22 ENCOUNTER — APPOINTMENT (OUTPATIENT)
Dept: GENERAL RADIOLOGY | Age: 55
End: 2020-09-22
Attending: PHYSICAL MEDICINE & REHABILITATION
Payer: MEDICARE

## 2020-09-22 ENCOUNTER — HOSPITAL ENCOUNTER (OUTPATIENT)
Age: 55
Setting detail: OUTPATIENT SURGERY
Discharge: HOME | End: 2020-09-22
Attending: PHYSICAL MEDICINE & REHABILITATION | Admitting: PHYSICAL MEDICINE & REHABILITATION
Payer: MEDICARE

## 2020-09-22 VITALS
OXYGEN SATURATION: 94 % | TEMPERATURE: 99 F | SYSTOLIC BLOOD PRESSURE: 165 MMHG | HEART RATE: 97 BPM | DIASTOLIC BLOOD PRESSURE: 110 MMHG | RESPIRATION RATE: 20 BRPM

## 2020-09-22 PROCEDURE — 74011000250 HC RX REV CODE- 250: Performed by: PHYSICAL MEDICINE & REHABILITATION

## 2020-09-22 PROCEDURE — 2709999900 HC NON-CHARGEABLE SUPPLY: Performed by: PHYSICAL MEDICINE & REHABILITATION

## 2020-09-22 PROCEDURE — 74011250636 HC RX REV CODE- 250/636: Performed by: PHYSICAL MEDICINE & REHABILITATION

## 2020-09-22 PROCEDURE — 77030003669 HC NDL SPN COOK -B: Performed by: PHYSICAL MEDICINE & REHABILITATION

## 2020-09-22 PROCEDURE — 77030003672 HC NDL SPN HALY -A: Performed by: PHYSICAL MEDICINE & REHABILITATION

## 2020-09-22 PROCEDURE — 77030039433 HC TY MYLEOGRAM BD -B: Performed by: PHYSICAL MEDICINE & REHABILITATION

## 2020-09-22 PROCEDURE — 74011000636 HC RX REV CODE- 636: Performed by: PHYSICAL MEDICINE & REHABILITATION

## 2020-09-22 PROCEDURE — 76010000009 HC PAIN MGT 0 TO 30 MIN PROC: Performed by: PHYSICAL MEDICINE & REHABILITATION

## 2020-09-22 RX ORDER — DEXAMETHASONE SODIUM PHOSPHATE 100 MG/10ML
INJECTION INTRAMUSCULAR; INTRAVENOUS AS NEEDED
Status: DISCONTINUED | OUTPATIENT
Start: 2020-09-22 | End: 2020-09-22 | Stop reason: HOSPADM

## 2020-09-22 RX ORDER — LIDOCAINE HYDROCHLORIDE 10 MG/ML
INJECTION, SOLUTION EPIDURAL; INFILTRATION; INTRACAUDAL; PERINEURAL AS NEEDED
Status: DISCONTINUED | OUTPATIENT
Start: 2020-09-22 | End: 2020-09-22 | Stop reason: HOSPADM

## 2020-09-22 RX ORDER — DIAZEPAM 5 MG/1
5-20 TABLET ORAL ONCE
Status: DISCONTINUED | OUTPATIENT
Start: 2020-09-22 | End: 2020-09-22 | Stop reason: HOSPADM

## 2020-09-22 NOTE — INTERVAL H&P NOTE
Update History & Physical 
 
The Patient's History and Physical of September 10,  
2020 was reviewed. There was no change. The surgical site was confirmed by the patient and me. Plan:  The risk, benefits, expected outcome, and alternative to the recommended procedure have been discussed with the patient. Patient understands and wants to proceed with the procedure.  
 
Electronically signed by Yasmin Sargent MD on 9/22/2020 at 9:44 AM

## 2020-09-22 NOTE — PROCEDURES
SELECTIVE NERVE ROOT BLOCK PROCEDURE NOTE      Patient Name: Henrik Parrish  Date of Procedure: September 22, 2020  Preoperative Diagnosis:  Lumbar radiculopathy [M54.16]  Post Operative Diagnosis:  Lumbar radiculopathy [M54.16]  Location:  [facility], Special Procedures Unit, [address] [place] [address]    Procedure :    left L5 Selective Nerve Root Block      Consent:  Informed consent was obtained prior to the procedure. The patient was given the opportunity to ask questions regarding the procedure and its associated risks. In addition to the potential risks associated with the procedure itself, the patient was informed both verbally and in writing of the potential side effects of the use of glucocorticoid. The patient appeared to comprehend the informed consent and desired to have the procedure performed. The patient was counseled at length about the risks of remington Covid-19 during their perioperative period and any recovery window from their procedure. The patient was made aware that remington Covid-19  may worsen their prognosis for recovering from their procedure and lend to a higher morbidity and/or mortality risk. All material risks, benefits, and reasonable alternatives including postponing the procedure were discussed. The patient does  wish to proceed with the procedure at this time. Procedure: The patient was placed in the prone position on the fluoroscopy table and the back was prepped and draped in the usual sterile manner. The exact spinal level was  identified using fluoroscopy, and Lidocaine 1 % was injected locally, a # 22 gauge spinal needle was passed to the transverse process. The depth was noted and the needle redirected to pass inferior and approximately one cm anterior to the transverse process. YES  1 cc of Isovue M-200 was used to verify positioning in the epidural and paravertebral space and outlined the course of the spinal nerve into the epidural space. The same procedure was repeated at each spinal level indicated above. No vascular uptake was identified. A total of 10 mg of preservative free Dexamethasone and 1 cc of Lidocaine/site was slowly injected. The patient tolerated the procedure well. The injection area was cleaned and bandaids applied. Not excessive bleeding was noted. Patient dressed and discharged to home with instructions. Discussion: The patient tolerated the procedure well.                                               Margaret Saxena MD  September 22, 2020

## 2020-09-22 NOTE — DISCHARGE INSTRUCTIONS
[facility]   (LEILANI)  Dr. Fanta Alfred, Dr. Corrie Grover, Dr. Bubba Camarillo Spinal Procedure (Block) Instructions    * Do not drive a car, operate heavy machinery or dangerous equipment, or make important decisions for 12-24 hours. * Light activity as tolerated; may rest for the remainder of the day. * Resume pre-block medications including those from your other doctors. * Do not drink alcoholic beverages for 24 hours. Alcohol and the medications you have received may interact and cause an adverse reaction. * You may feel better this evening and worse tomorrow, as the numbing medications wears off and the steroid has yet to begin to work. After 48-72 hrs the steroid should begin to release bringing you relief. If you had a medial branch block, no steroids were used. The medial branch block is a test to see if you are a candidate for radiofrequency ablation (RFA). The anesthetic (numbing medicine)  will wear off by the next day. * You may shower this evening and remove any bandages. * Avoid hot tubs/pools/tub soaks and heating pads for 24 hours. You may use cold packs on the procedure site as tolerated for the first 24 hours. * If a headache develops, drink plenty of fluids and rest.  Take over the counter medications for headache if needed. If the headache continues longer than 24 hours, call MD at the [address] [place] [address] Avenue. [phone]    * Continue taking pain medications as needed. * You may resume your regular diet if tolerated. Otherwise, start with sips of water and advance slowly. * If Diabetic: check your blood sugar three times a day for the next 3 days. If your sugar is greater than 300 call your family doctor. If your sugar is greater than 400, have someone transport you to the nearest Emergency Room. * If you experience any of the following problems, Please Call the [address] [place] [address] Avenue at [phone].         * Excessive pain, swelling, redness or odor at or around the surgical area    * Fever of 101 or higher    * Nausea / Vomiting lasting longer than 4 hours or if unable to take medications. * Severe Headache    * Weakness or numbness in arms or legs that is not      resolving   * Any NEW signs of decreased circulation or nerve impairment in leg: change in color, swelling, persistent numbness, tingling                    * Prolonged increase in pain greater than 4 days      PATIENT INSTRUCTIONS:    After oral sedation, for 12-24 hours or while taking prescription Narcotics:  · Limit your activities  · Do not drive and operate hazardous machinery  · Do not make important personal or business decisions  · Do  not drink alcoholic beverages  · If you have not urinated within 8 hours after discharge, please contact your surgeon on call. *  Please give a list of your current medications to your Primary Care Provider. *  Please update this list whenever your medications are discontinued, doses are      changed, or new medications (including over-the-counter products) are added. *  Please carry medication information at all times in case of emergency situations. These are general instructions for a healthy lifestyle:    No smoking/ No tobacco products/ Avoid exposure to second hand smoke    Surgeon General's Warning:  Quitting smoking now greatly reduces serious risk to your health. Obesity, smoking, and sedentary lifestyle greatly increases your risk for illness    A healthy diet, regular physical exercise & weight monitoring are important for maintaining a healthy lifestyle    You may be retaining fluid if you have a history of heart failure or if you experience any of the following symptoms:  Weight gain of 3 pounds or more overnight or 5 pounds in a week, increased swelling in our hands or feet or shortness of breath while lying flat in bed.   Please call your doctor as soon as you notice any of these symptoms; do not wait until your next office visit. Recognize signs and symptoms of STROKE:    F-face looks uneven    A-arms unable to move or move unevenly    S-speech slurred or non-existent    T-time-call 911 as soon as signs and symptoms begin-DO NOT go       Back to bed or wait to see if you get better-TIME IS BRAIN.

## 2020-10-20 ENCOUNTER — OFFICE VISIT (OUTPATIENT)
Dept: ORTHOPEDIC SURGERY | Age: 55
End: 2020-10-20
Payer: MEDICARE

## 2020-10-20 VITALS
DIASTOLIC BLOOD PRESSURE: 90 MMHG | WEIGHT: 266.6 LBS | RESPIRATION RATE: 20 BRPM | SYSTOLIC BLOOD PRESSURE: 144 MMHG | TEMPERATURE: 98 F | HEIGHT: 72 IN | BODY MASS INDEX: 36.11 KG/M2 | OXYGEN SATURATION: 96 % | HEART RATE: 98 BPM

## 2020-10-20 DIAGNOSIS — M54.16 LUMBAR RADICULOPATHY: Primary | ICD-10-CM

## 2020-10-20 PROCEDURE — 99213 OFFICE O/P EST LOW 20 MIN: CPT | Performed by: PHYSICAL MEDICINE & REHABILITATION

## 2020-10-20 PROCEDURE — G8427 DOCREV CUR MEDS BY ELIG CLIN: HCPCS | Performed by: PHYSICAL MEDICINE & REHABILITATION

## 2020-10-20 PROCEDURE — G8417 CALC BMI ABV UP PARAM F/U: HCPCS | Performed by: PHYSICAL MEDICINE & REHABILITATION

## 2020-10-20 PROCEDURE — 3017F COLORECTAL CA SCREEN DOC REV: CPT | Performed by: PHYSICAL MEDICINE & REHABILITATION

## 2020-10-20 PROCEDURE — G8432 DEP SCR NOT DOC, RNG: HCPCS | Performed by: PHYSICAL MEDICINE & REHABILITATION

## 2020-10-20 RX ORDER — LISINOPRIL 5 MG/1
TABLET ORAL
COMMUNITY
Start: 2020-08-28

## 2020-10-20 RX ORDER — PREDNISONE 10 MG/1
TABLET ORAL
Qty: 21 TAB | Refills: 0 | Status: SHIPPED | OUTPATIENT
Start: 2020-10-20 | End: 2021-01-08 | Stop reason: ALTCHOICE

## 2020-10-20 NOTE — PROGRESS NOTES
[address], [address]  [facility], [address]  Phone: [phone]  Fax: [phone]        Lester Sapp  : 1965  PCP: Nitesh Benito MD  10/20/2020    PROGRESS NOTE      HISTORY OF PRESENT ILLNESS  Licha Mccall is a 54 y.o. male  who was seen as a new patient 2020 with c/o hx of stroke(speech deficit) c/o left low leg and low back pain x 4 weeks. He had been doing yard work planting bulbs for a few days prior to his symptoms. Since onset, it worsened despite having gone to the ED twice. None of the medications helped including medrol, muscle relaxers, NSAIDS, tylenol. He had constant symptoms that were provoked mainly by standing and walking. Sitting and laying were positions of comfort. The symptoms ran from the buttock to the side of the leg and top of the foot. His strength was intact, and he had no sensory deficit at the time. He continued to have low back pain radiating into the LLE into the top of the foot. He did not find improvement from a Prednisone dose pack. He did not see much benefit from Gabapentin 900 mg TID. Pt returned with increased symptoms of low back pain radiating into the LLE. He found some benefit initially from Lyrica 225 mg BID but noted that it did not last very long. Pt has been intolerant to [address] secondary to vomiting, blurry vision. [facility] with some benefit. Lumbar spine MRI dated 2020 reviewed. Per report, L5-S1 demonstrates degenerative changes and left larger than right herniations into the neuroforamina compression of the left L5 nerve root. Active inflammation around the L5-S1 disc space. Active inflammation around the left L4-5 facet synovial lining. Licha Mccall comes in to the office today for f/u. He underwent a left L5 TFESI (2020; Dr. Ladarius Fox) with some relief of his LLE pain.  He continues to have some residual LLE symptoms, especially in the left buttock, but overall, it has improved. Pain Score: 0-7/10. Treatments patient has tried:  Physical Therapy - yes 2020  Medications - Gabapentin, Prednisone, LYRICA, MDP, muscle relaxants, NSAIDs  Injections - pending     PmHx: stroke (speech deficits)    ASSESSMENT  This is a 54year-old male with acute low back pain radiating into the LLE. His symptoms are likely due to an acute left L5/S1 radiculopathy. He had a positive SLR, weakness with ankle PF, and decreased sensation in an S1 distribution on the L.     PLAN  1. 2 x Left L5 TFESI - he can cancel this if he finds relief from the Prednisone. 2. 10 mg Prednisone dose pack. 3. We may consider an LLE EMG if he does not continue to find relief with this second injection. Pt will f/u in 2-4 weeks after injection or sooner as needed. Diagnoses and all orders for this visit:    1. Lumbar radiculopathy  -     SCHEDULE SURGERY  -     predniSONE (STERAPRED DS) 10 mg dose pack; See administration instruction per 10mg dose pack         PAST MEDICAL HISTORY   Past Medical History:   Diagnosis Date    Anemia     Hypertension     Stroke [facility])        Past Surgical History:   Procedure Laterality Date    COLONOSCOPY     . MEDICATIONS      Current Outpatient Medications   Medication Sig Dispense Refill    lisinopriL (PRINIVIL, ZESTRIL) 5 mg tablet TAKE ONE TABLET BY MOUTH DAILY      rosuvastatin (CRESTOR) 40 mg tablet Take 1 Tab by mouth nightly.  acetaminophen (Tylenol Extra Strength) 500 mg tablet Take 2 Tabs by mouth every six (6) hours as needed for Pain. 20 Tab 0    atorvastatin (LIPITOR) 80 mg tablet Take 80 mg by mouth daily.  pregabalin (LYRICA) 300 mg capsule Take 1 Cap by mouth two (2) times a day. Max Daily Amount: 600 mg. 60 Cap 2    traMADoL (ULTRAM) 50 mg tablet Take  mg by mouth every eight (8) hours as needed.           ALLERGIES    Allergies   Allergen Reactions    Codeine Other (comments) VOMITING          SOCIAL HISTORY    Social History     Socioeconomic History    Marital status:      Spouse name: Not on file    Number of children: Not on file    Years of education: Not on file    Highest education level: Not on file   Tobacco Use    Smoking status: Current Every Day Smoker     Packs/day: 1.00     Years: 15.00     Pack years: 15.00    Smokeless tobacco: Current User   Substance and Sexual Activity    Alcohol use: Not Currently     Alcohol/week: 0.8 standard drinks     Types: 1 Cans of beer per week     Comment: states that he drinks 3 times per day    Drug use: No    Sexual activity: Not Currently   Social History Narrative    ** Merged History Encounter **            FAMILY HISTORY    Family History   Problem Relation Age of Onset    Hypertension Mother     High Cholesterol Mother     Elevated Lipids Mother     COPD Father     Heart Failure Brother     Diabetes Brother          REVIEW OF SYSTEMS  Review of Systems   Constitutional: Negative for chills, fever and weight loss. Respiratory: Negative for shortness of breath. Cardiovascular: Negative for chest pain. Gastrointestinal: Negative for constipation. Negative for fecal incontinence    Genitourinary: Negative for dysuria. Negative for urinary incontinence   Musculoskeletal: Positive for back pain. Skin: Negative for rash. Neurological: Positive for tingling ( LLE paraesthesia ). Negative for dizziness, tremors, focal weakness and headaches. Endo/Heme/Allergies: Does not bruise/bleed easily. Psychiatric/Behavioral: The patient does not have insomnia.            PHYSICAL EXAMINATION  Visit Vitals  Pulse 98   Temp 98 °F (36.7 °C)   Resp 20   Ht 6' (1.829 m)   Wt 266 lb 9.6 oz (120.9 kg)   SpO2 96%   BMI 36.16 kg/m²       Pain Assessment  10/20/2020   Location of Pain Back   Location Modifiers Left   Severity of Pain 7   Quality of Pain Other (Comment)   Quality of Pain Comment heavy feeling Duration of Pain Other (Comment)   Frequency of Pain Other (Comment)   Aggravating Factors Other (Comment)   Aggravating Factors Comment -   Limiting Behavior Some   Relieving Factors Other (Comment)   Relieving Factors Comment -   Result of Injury No           Constitutional:  Well developed, well nourished, in no acute distress. Psychiatric: Affect and mood are appropriate. Integumentary: No rashes or abrasions noted on exposed areas. SPINE/MUSCULOSKELETAL EXAM    Lumbar spine:  No rash, ecchymosis, or gross obliquity. No fasciculations. No focal atrophy is noted. No pain with hip ROM. Full range of motion. No tenderness to palpation. No tenderness to palpation at the sciatic notch. SI joints non-tender. Trochanters non tender.     Decreased sensation on the L in an S1 distribution.     Positive Straight Leg Raise on the Left.     MOTOR:      Biceps  Triceps Deltoids Wrist Ext Wrist Flex Hand Intrin   Right 5/5 5/5 5/5 5/5 5/5 5/5   Left 5/5 5/5 5/5 5/5 5/5 5/5             Hip Flex  Quads Hamstrings Ankle DF EHL Ankle PF   Right 5/5 5/5 5/5 5/5 5/5 5/5   Left 5/5 5/5 5/5 5/5 5/5 5/5     RADIOGRAPHS  Lumbar MRI images taken on 8/27/2020 personally reviewed with patient:  Alignment: Intact lordosis  Vertebral body height: Normal  Marrow signal: Unremarkable  Disc spaces: Preserved height and signal intensity  Conus: Terminates at T12-L1     Axial imaging correlation:     T11-12: There is mild signal loss reflecting some early intradiscal degenerative  change     L1-2: Patent canal and foramina.     L2-3: Patent canal and foramina.     L3-4: Signal loss is identified within the disc space. Disc space is not  narrowed. Central canal neuroforamina bilaterally patent. L4-5: Mild signal loss is identified there is a small central protrusion-type  Herniation.  There is some increased signal in the synovium of the left L4-5 disc space not seen on adjacent levels consistent with some active inflammation of the synovial lining here may be the most significant pain generator on this exam.     Left and right neuroforamina are spared.     L5-S1: moderate signal loss mild narrowing of the disks present     Incidental anterior herniation and osteophytic formation also identified     Endplates demonstrate decreased signal T1 and increased on T2 consistent with Modic endplate changes reflecting active inflammatory change     The central canal is intact     The left neuroforamina demonstrates herniation osteophyte extending into the  neuroforamina. In addition there are some mild facet arthropathy. These changes appear to be compressing the exiting L5 root on the more lateral sagittal cuts. See image 4 series 3. These changes do not extend into the central canal to cause mass effect on the exiting left S1 nerve root     Right neuroforamina demonstrates similar osteophyte herniation call, defect  however now smaller. Defect does touch inferior surface of the exiting right L5  nerve root. Nerve does not appear compressed or edematous.      Other structures: Unremarkable.        IMPRESSION  IMPRESSION:     L5-S1 demonstrates degenerative changes and left larger than right herniations  into the neuroforamina compression of the left L5 nerve root  Active inflammation around the L5-S1 disc space  Active inflammation around the left L4-5 facet synovial lining    Lumbar x-ray images taken on 4/30/2020 personally reviewed with patient:  FINDINGS: 5 views of the lumbar spine obtained. Vertebral body heights  preserved. Disc space loss at L5-S1. Lower lumbar endplate spurring. No  significant listhesis. Lumbar lordosis maintained. No discrete pars defects  identified. Lower lumbar mild facet arthropathy. No acute fracture identified.     IMPRESSION  IMPRESSION:     No clearly acute findings. Notable lower lumbar degenerative changes.      8 minutes of face-to-face contact were spent with the patient during today's visit extensively discussing symptoms and treatment plan. All questions were answered. More than half of this visit today was spent on counseling.      Written by Chantale Granda as dictated by Reema Dixon MD

## 2020-12-03 ENCOUNTER — OFFICE VISIT (OUTPATIENT)
Dept: ORTHOPEDIC SURGERY | Age: 55
End: 2020-12-03
Payer: MEDICARE

## 2020-12-03 VITALS
TEMPERATURE: 97.6 F | SYSTOLIC BLOOD PRESSURE: 155 MMHG | DIASTOLIC BLOOD PRESSURE: 90 MMHG | BODY MASS INDEX: 36.16 KG/M2 | HEART RATE: 90 BPM | RESPIRATION RATE: 20 BRPM | HEIGHT: 72 IN

## 2020-12-03 DIAGNOSIS — R20.2 NUMBNESS AND TINGLING OF LEFT LEG: ICD-10-CM

## 2020-12-03 DIAGNOSIS — M54.16 LUMBAR RADICULOPATHY: Primary | ICD-10-CM

## 2020-12-03 DIAGNOSIS — R20.0 NUMBNESS AND TINGLING OF LEFT LEG: ICD-10-CM

## 2020-12-03 PROCEDURE — G8432 DEP SCR NOT DOC, RNG: HCPCS | Performed by: PHYSICAL MEDICINE & REHABILITATION

## 2020-12-03 PROCEDURE — 3017F COLORECTAL CA SCREEN DOC REV: CPT | Performed by: PHYSICAL MEDICINE & REHABILITATION

## 2020-12-03 PROCEDURE — 99213 OFFICE O/P EST LOW 20 MIN: CPT | Performed by: PHYSICAL MEDICINE & REHABILITATION

## 2020-12-03 PROCEDURE — G8417 CALC BMI ABV UP PARAM F/U: HCPCS | Performed by: PHYSICAL MEDICINE & REHABILITATION

## 2020-12-03 PROCEDURE — G8427 DOCREV CUR MEDS BY ELIG CLIN: HCPCS | Performed by: PHYSICAL MEDICINE & REHABILITATION

## 2020-12-03 RX ORDER — AMITRIPTYLINE HYDROCHLORIDE 25 MG/1
75 TABLET, FILM COATED ORAL
Qty: 90 TAB | Refills: 2 | Status: SHIPPED | OUTPATIENT
Start: 2020-12-03 | End: 2021-10-07 | Stop reason: CLARIF

## 2020-12-03 NOTE — PATIENT INSTRUCTIONS
Electromyogram (EMG) and Nerve Conduction Studies: About These Tests  What are they? An electromyogram (EMG) measures the electrical activity of your muscles when you are not using them (at rest) and when you tighten them (muscle contraction). Nerve conduction studies (NCS) measure how well and how fast the nerves can send electrical signals. EMG and nerve conduction studies are often done together. If they are done together, the nerve conduction studies are done before the EMG. Why are they done? You may need an EMG to find diseases that damage your muscles or nerves or to find why you can't move your muscles (paralysis), why they feel weak, or why they twitch. These problems may include a herniated disc, amyotrophic lateral sclerosis (ALS), or myasthenia gravis (MG). You may need nerve conduction studies to find damage to the nerves that lead from the brain and spinal cord to the rest of the body. (This is called the peripheral nervous system.) These studies are often used to help find nerve disorders, such as carpal tunnel syndrome. How do you prepare for these tests?    · Wear loose-fitting clothing. You may be given a hospital gown to wear.     · The electrodes for the test are attached to your skin. Your skin needs to be clean and free of sprays, oils, creams, and lotions.     · You may be asked to sign a consent form that says you understand the risks of the test and agree to have it done. · Tell your doctor ALL the medicines, vitamins, supplements, and herbal remedies you take. Some may increase the risk of problems during your test. Your doctor will tell you if you should stop taking any of them before the test and how soon to do it.     · If you take aspirin or some other blood thinner, ask your doctor if you should stop taking it before your test. Make sure that you understand exactly what your doctor wants you to do. These medicines increase the risk of bleeding.    How are the tests done?  You lie on a table or bed or sit in a reclining chair so your muscles are relaxed. For an EMG:   · Your doctor will insert a needle electrode into a muscle. This will record the electrical activity while the muscle is at rest.  · Your doctor will ask you to tighten the same muscle slowly and steadily while the electrical activity is recorded. · Your doctor may move the electrode to a different area of the muscle or a different muscle. For nerve conduction studies:   · Your doctor will attach two types of electrodes to your skin. ? One type of electrode is placed over a nerve and will give the nerve an electrical pulse. ? The other type of electrode is placed over the muscle that the nerve controls. It will record how long it takes the muscle to react to the electrical pulse. How does having electromyogram (EMG) and nerve conduction studies feel? During an EMG test, you may feel a quick, sharp pain when the needle electrode is put into a muscle. With nerve conduction studies, you will be able to feel the electrical pulses. The tests make some people anxious. Keep in mind that only a very low-voltage electrical current is used. And each electrical pulse is very quick. It lasts less than a second. How long do they take? · An EMG may take 30 to 60 minutes. · Nerve conduction tests may take from 15 minutes to 1 hour or more. It depends on how many nerves and muscles your doctor tests. What happens after these tests? · After the test, you may be sore and feel a tingling in your muscles. This may last for up to 2 days. · If any of the test areas are sore:  ? Put ice or a cold pack on the area for 10 to 20 minutes at a time. Put a thin cloth between the ice and your skin. ? Take an over-the-counter pain medicine, such as acetaminophen (Tylenol), ibuprofen (Advil, Motrin), or naproxen (Aleve). Be safe with medicines. Read and follow all instructions on the label.   · You will probably be able to go home right away. It depends on the reason for the test.  · You can go back to your usual activities right away. When should you call for help? Watch closely for changes in your health, and be sure to contact your doctor if:  · Muscle pain from an EMG test gets worse or you have swelling, tenderness, or pus at any of the needle sites. · You have any problems that you think may be from the test.  · You have any questions about the test or have not received your results. Follow-up care is a key part of your treatment and safety. Be sure to make and go to all appointments, and call your doctor if you are having problems. It's also a good idea to keep a list of the medicines you take. Ask your doctor when you can expect to have your test results. Where can you learn more? Go to http://www.gray.com/  Enter [id number] in the search box to learn more about \"Electromyogram (EMG) and Nerve Conduction Studies: About These Tests. \"  Current as of: November 20, 2019               Content Version: 12.6  © 5691-2300 [employer]. Care instructions adapted under license by [employer] (which disclaims liability or warranty for this information). If you have questions about a medical condition or this instruction, always ask your healthcare professional. [address] any warranty or liability for your use of this information.

## 2020-12-03 NOTE — H&P (VIEW-ONLY)
[facility] [address] 
[address], [address] [place], [address] [place] [address] Phone: [phone] Fax: [phone] Kristen Garvin : 1965 PCP: Jamila Chaudhari MD 
12/3/2020 PROGRESS NOTE HISTORY OF PRESENT ILLNESS Rasta Garcia is a 54 y.o. male who was seen as a new patient 2020 with c/o hx of stroke(speech deficit) c/o left low leg and low back pain x 4 weeks. He had been doing yard work planting bulbs for a few days prior to his symptoms. Since onset, it worsened despite having gone to the ED twice. None of the medications helped including medrol, muscle relaxers, NSAIDS, tylenol. He had constant symptoms that were provoked mainly by standing and walking. Sitting and laying were positions of comfort. The symptoms ran from the buttock to the side of the leg and top of the foot. His strength was intact, and he had no sensory deficit at the time. He continued to have low back pain radiating into the LLE into the top of the foot. He did not find improvement from a Prednisone dose pack. He did not see much benefit from Gabapentin 900 mg TID. Pt returned with increased symptoms of low back pain radiating into the LLE. He found some benefit initially from Lyrica 225 mg BID but noted that it did not last very long. Pt has been intolerant to [address] secondary to vomiting, blurry vision. He attended PT (-2020; [facility]) with some benefit. Lumbar spine MRI dated 2020 reviewed. Per report, L5-S1 demonstrates degenerative changes and left larger than right herniations into the neuroforamina compression of the left L5 nerve root. Active inflammation around the L5-S1 disc space. Active inflammation around the left L4-5 facet synovial lining. He underwent a left L5 TFESI (2020; Dr. Susie Hawkins) with some relief of his LLE pain. He continues to have some residual LLE symptoms, especially in the left buttock, but overall, it has improved. Augusta Esteves comes in to the office today for f/u. He was not scheduled for a second injection. He continues to have LLE paraesthesia to the calf. He found some benefit with Prednisone. Pain Score: 6/10. Treatments patient has tried: 
Physical Therapy - yes 2020 Medications - Gabapentin, Prednisone, LYRICA, MDP, muscle relaxants, NSAIDs Injections - Left L5 TFESI (9/22/2020) with benefit 
  
PmHx: stroke (speech deficits) ASSESSMENT This is a 54year-old male with acute low back pain radiating into the LLE. His symptoms are likely due to an acute left L5/S1 radiculopathy. He had a positive SLR, weakness with ankle PF, and decreased sensation in an S1 distribution on the L.  PLAN 1. Left L5 TFESI 2. LLE EMG - evaluate left L5/S1 radiculopathy 3. Amitriptyline 3 x 25 mg QHS. Pt will f/u in for LLE EMG (30 minutes) or sooner as needed. Diagnoses and all orders for this visit: 1. Lumbar radiculopathy 
-     SCHEDULE SURGERY 
-     EMG ONE EXTREMITY LOWER LT; Future 
-     amitriptyline (ELAVIL) 25 mg tablet; Take 3 Tabs by mouth nightly. 2. Numbness and tingling of left leg 
-     EMG ONE EXTREMITY LOWER LT; Future PAST MEDICAL HISTORY Past Medical History:  
Diagnosis Date  Anemia  Hypertension  Stroke ([facility] [address] Past Surgical History:  
Procedure Laterality Date  COLONOSCOPY Rupinder Simpler MEDICATIONS Current Outpatient Medications Medication Sig Dispense Refill  amitriptyline (ELAVIL) 25 mg tablet Take 3 Tabs by mouth nightly. 90 Tab 2  
 lisinopriL (PRINIVIL, ZESTRIL) 5 mg tablet TAKE ONE TABLET BY MOUTH DAILY  pregabalin (LYRICA) 300 mg capsule Take 1 Cap by mouth two (2) times a day. Max Daily Amount: 600 mg. 60 Cap 2  
 acetaminophen (Tylenol Extra Strength) 500 mg tablet Take 2 Tabs by mouth every six (6) hours as needed for Pain. 20 Tab 0  
 atorvastatin (LIPITOR) 80 mg tablet Take 80 mg by mouth daily.  predniSONE (STERAPRED DS) 10 mg dose pack See administration instruction per 10mg dose pack 21 Tab 0  
 rosuvastatin (CRESTOR) 40 mg tablet Take 1 Tab by mouth nightly.  traMADoL (ULTRAM) 50 mg tablet Take  mg by mouth every eight (8) hours as needed. ALLERGIES Allergies Allergen Reactions  Codeine Other (comments) VOMITING  
    
 
SOCIAL HISTORY Social History Socioeconomic History  Marital status:  Spouse name: Not on file  Number of children: Not on file  Years of education: Not on file  Highest education level: Not on file Tobacco Use  Smoking status: Current Every Day Smoker Packs/day: 1.00 Years: 15.00 Pack years: 15.00  Smokeless tobacco: Current User Substance and Sexual Activity  Alcohol use: Not Currently Alcohol/week: 0.8 standard drinks Types: 1 Cans of beer per week Comment: states that he drinks 3 times per day  Drug use: No  
 Sexual activity: Not Currently Social History Narrative ** Merged History Encounter ** FAMILY HISTORY Family History Problem Relation Age of Onset  Hypertension Mother  High Cholesterol Mother  Elevated Lipids Mother  COPD Father  Heart Failure Brother  Diabetes Brother REVIEW OF SYSTEMS Review of Systems Constitutional: Negative for chills, fever and weight loss. Respiratory: Negative for shortness of breath. Cardiovascular: Negative for chest pain. Gastrointestinal: Negative for constipation. Negative for fecal incontinence Genitourinary: Negative for dysuria. Negative for urinary incontinence Musculoskeletal: Positive for back pain. Skin: Negative for rash. Neurological: Positive for tingling (LLE paraesthesia ). Negative for dizziness, tremors, focal weakness and headaches. Endo/Heme/Allergies: Does not bruise/bleed easily. Psychiatric/Behavioral: The patient does not have insomnia. PHYSICAL EXAMINATION Visit Vitals BP (!) 155/90 Pulse 90 Temp 97.6 °F (36.4 °C) (Temporal) Resp 20 Ht 6' (1.829 m) BMI 36.16 kg/m² Pain Assessment  12/3/2020 Location of Pain Leg;Back; Hip Location Modifiers Left Severity of Pain 6 Quality of Pain Sharp Quality of Pain Comment n/t lower side of legs Duration of Pain Persistent Frequency of Pain Constant Aggravating Factors - Aggravating Factors Comment - Limiting Behavior -  
Relieving Factors - Relieving Factors Comment - Result of Injury - Constitutional:  Well developed, well nourished, in no acute distress. Psychiatric: Affect and mood are appropriate. Integumentary: No rashes or abrasions noted on exposed areas. SPINE/MUSCULOSKELETAL EXAM 
 
Lumbar spine: No rash, ecchymosis, or gross obliquity. No fasciculations. No focal atrophy is noted. No pain with hip ROM. Full range of motion. No tenderness to palpation. No tenderness to palpation at the sciatic notch. SI joints non-tender. Trochanters non tender. 
  
Decreased sensation on the L in an S1 distribution. 
  
Positive Straight Leg Raise on the Left.  
 
MOTOR:   
  Biceps  Triceps Deltoids Wrist Ext Wrist Flex Hand Intrin Right 5/5 5/5 5/5 5/5 5/5 5/5 Left 5/5 5/5 5/5 5/5 5/5 5/5 Hip Flex  Quads Hamstrings Ankle DF EHL Ankle PF Right 5/5 5/5 5/5 5/5 5/5 5/5 Left 5/5 5/5 5/5 5/5 5/5 5/5 RADIOGRAPHS Lumbar MRI images taken on 8/27/2020 personally reviewed with patient: 
Alignment: Intact lordosis Vertebral body height: Normal 
Marrow signal: Unremarkable Disc spaces: Preserved height and signal intensity Conus: Terminates at T12-L1 
  
Axial imaging correlation: 
  
T11-12:  There is mild signal loss reflecting some early intradiscal degenerative 
change 
  
L1-2: Patent canal and foramina. 
  
L2-3: Patent canal and foramina. 
  
 L3-4: Signal loss is identified within the disc space. Disc space is not 
narrowed. Central canal neuroforamina bilaterally patent. L4-5: Mild signal loss is identified there is a small central protrusion-type Herniation. There is some increased signal in the synovium of the left L4-5 disc space not seen on adjacent levels consistent with some active inflammation of the synovial lining here may be the most significant pain generator on this exam. 
  
Left and right neuroforamina are spared. 
  
L5-S1: moderate signal loss mild narrowing of the disks present 
  
Incidental anterior herniation and osteophytic formation also identified 
  
Endplates demonstrate decreased signal T1 and increased on T2 consistent with Modic endplate changes reflecting active inflammatory change 
  The central canal is intact 
  
The left neuroforamina demonstrates herniation osteophyte extending into the 
neuroforamina. In addition there are some mild facet arthropathy. These changes appear to be compressing the exiting L5 root on the more lateral sagittal cuts. See image 4 series 3. These changes do not extend into the central canal to cause mass effect on the exiting left S1 nerve root 
  
Right neuroforamina demonstrates similar osteophyte herniation call, defect 
however now smaller. Defect does touch inferior surface of the exiting right L5 
nerve root. Nerve does not appear compressed or edematous. 
   
Other structures: Unremarkable. 
  
  
IMPRESSION IMPRESSION: 
  
L5-S1 demonstrates degenerative changes and left larger than right herniations 
into the neuroforamina compression of the left L5 nerve root Active inflammation around the L5-S1 disc space Active inflammation around the left L4-5 facet synovial lining Lumbar x-ray images taken on 4/30/2020 personally reviewed with patient: 
FINDINGS: 5 views of the lumbar spine obtained. Vertebral body heights preserved. Disc space loss at L5-S1. Lower lumbar endplate spurring. No 
significant listhesis. Lumbar lordosis maintained. No discrete pars defects 
identified. Lower lumbar mild facet arthropathy. No acute fracture identified. 
  
IMPRESSION IMPRESSION: 
  
No clearly acute findings. Notable lower lumbar degenerative changes. 10 minutes of face-to-face contact were spent with the patient during today's visit extensively discussing symptoms and treatment plan. All questions were answered. More than half of this visit today was spent on counseling.   
 
Written by Abebe Zaragoza as dictated by Donnie Mason MD

## 2020-12-03 NOTE — PROGRESS NOTES
[address], [address]  [place], [address] [place] [address] Street  Phone: [phone]  Fax: [phone]        Lester Sapp  : 1965  PCP: Nitesh Benito MD  12/3/2020    PROGRESS NOTE      HISTORY OF PRESENT ILLNESS  Licha Mccall is a 54 y.o. male who was seen as a new patient 2020 with c/o hx of stroke(speech deficit) c/o left low leg and low back pain x 4 weeks. He had been doing yard work planting bulbs for a few days prior to his symptoms. Since onset, it worsened despite having gone to the ED twice. None of the medications helped including medrol, muscle relaxers, NSAIDS, tylenol. He had constant symptoms that were provoked mainly by standing and walking. Sitting and laying were positions of comfort. The symptoms ran from the buttock to the side of the leg and top of the foot. His strength was intact, and he had no sensory deficit at the time. He continued to have low back pain radiating into the LLE into the top of the foot. He did not find improvement from a Prednisone dose pack. He did not see much benefit from Gabapentin 900 mg TID. Pt returned with increased symptoms of low back pain radiating into the LLE. He found some benefit initially from Lyrica 225 mg BID but noted that it did not last very long. Pt has been intolerant to [address] secondary to vomiting, blurry vision. He attended PT (-2020; [facility]) with some benefit. Lumbar spine MRI dated 2020 reviewed. Per report, L5-S1 demonstrates degenerative changes and left larger than right herniations into the neuroforamina compression of the left L5 nerve root. Active inflammation around the L5-S1 disc space. Active inflammation around the left L4-5 facet synovial lining. He underwent a left L5 TFESI (2020; Dr. Ladarius Fox) with some relief of his LLE pain.  He continues to have some residual LLE symptoms, especially in the left buttock, but overall, it has improved. Otilia Thomas comes in to the office today for f/u. He was not scheduled for a second injection. He continues to have LLE paraesthesia to the calf. He found some benefit with Prednisone. Pain Score: 6/10. Treatments patient has tried:  Physical Therapy - yes 2020  Medications - Gabapentin, Prednisone, LYRICA, MDP, muscle relaxants, NSAIDs  Injections - Left L5 TFESI (9/22/2020) with benefit     PmHx: stroke (speech deficits)    ASSESSMENT  This is a 54year-old male with acute low back pain radiating into the LLE. His symptoms are likely due to an acute left L5/S1 radiculopathy. He had a positive SLR, weakness with ankle PF, and decreased sensation in an S1 distribution on the L.     PLAN  1. Left L5 TFESI   2. LLE EMG - evaluate left L5/S1 radiculopathy  3. Amitriptyline 3 x 25 mg QHS. Pt will f/u in for LLE EMG (30 minutes) or sooner as needed. Diagnoses and all orders for this visit:    1. Lumbar radiculopathy  -     SCHEDULE SURGERY  -     EMG ONE EXTREMITY LOWER LT; Future  -     amitriptyline (ELAVIL) 25 mg tablet; Take 3 Tabs by mouth nightly. 2. Numbness and tingling of left leg  -     EMG ONE EXTREMITY LOWER LT; Future         PAST MEDICAL HISTORY   Past Medical History:   Diagnosis Date    Anemia     Hypertension     Stroke [facility])        Past Surgical History:   Procedure Laterality Date    COLONOSCOPY     . MEDICATIONS      Current Outpatient Medications   Medication Sig Dispense Refill    amitriptyline (ELAVIL) 25 mg tablet Take 3 Tabs by mouth nightly. 90 Tab 2    lisinopriL (PRINIVIL, ZESTRIL) 5 mg tablet TAKE ONE TABLET BY MOUTH DAILY      pregabalin (LYRICA) 300 mg capsule Take 1 Cap by mouth two (2) times a day. Max Daily Amount: 600 mg. 60 Cap 2    acetaminophen (Tylenol Extra Strength) 500 mg tablet Take 2 Tabs by mouth every six (6) hours as needed for Pain. 20 Tab 0    atorvastatin (LIPITOR) 80 mg tablet Take 80 mg by mouth daily.         predniSONE (STERAPRED DS) 10 mg dose pack See administration instruction per 10mg dose pack 21 Tab 0    rosuvastatin (CRESTOR) 40 mg tablet Take 1 Tab by mouth nightly.  traMADoL (ULTRAM) 50 mg tablet Take  mg by mouth every eight (8) hours as needed. ALLERGIES  Allergies   Allergen Reactions    Codeine Other (comments)     VOMITING          SOCIAL HISTORY    Social History     Socioeconomic History    Marital status:      Spouse name: Not on file    Number of children: Not on file    Years of education: Not on file    Highest education level: Not on file   Tobacco Use    Smoking status: Current Every Day Smoker     Packs/day: 1.00     Years: 15.00     Pack years: 15.00    Smokeless tobacco: Current User   Substance and Sexual Activity    Alcohol use: Not Currently     Alcohol/week: 0.8 standard drinks     Types: 1 Cans of beer per week     Comment: states that he drinks 3 times per day    Drug use: No    Sexual activity: Not Currently   Social History Narrative    ** Merged History Encounter **            FAMILY HISTORY  Family History   Problem Relation Age of Onset    Hypertension Mother     High Cholesterol Mother     Elevated Lipids Mother     COPD Father     Heart Failure Brother     Diabetes Brother          REVIEW OF SYSTEMS  Review of Systems   Constitutional: Negative for chills, fever and weight loss. Respiratory: Negative for shortness of breath. Cardiovascular: Negative for chest pain. Gastrointestinal: Negative for constipation. Negative for fecal incontinence    Genitourinary: Negative for dysuria. Negative for urinary incontinence   Musculoskeletal: Positive for back pain. Skin: Negative for rash. Neurological: Positive for tingling (LLE paraesthesia ). Negative for dizziness, tremors, focal weakness and headaches. Endo/Heme/Allergies: Does not bruise/bleed easily. Psychiatric/Behavioral: The patient does not have insomnia. PHYSICAL EXAMINATION  Visit Vitals  BP (!) 155/90   Pulse 90   Temp 97.6 °F (36.4 °C) (Temporal)   Resp 20   Ht 6' (1.829 m)   BMI 36.16 kg/m²       Pain Assessment  12/3/2020   Location of Pain Leg;Back; Hip   Location Modifiers Left   Severity of Pain 6   Quality of Pain Sharp   Quality of Pain Comment n/t lower side of legs   Duration of Pain Persistent   Frequency of Pain Constant   Aggravating Factors -   Aggravating Factors Comment -   Limiting Behavior -   Relieving Factors -   Relieving Factors Comment -   Result of Injury -           Constitutional:  Well developed, well nourished, in no acute distress. Psychiatric: Affect and mood are appropriate. Integumentary: No rashes or abrasions noted on exposed areas. SPINE/MUSCULOSKELETAL EXAM    Lumbar spine:  No rash, ecchymosis, or gross obliquity. No fasciculations. No focal atrophy is noted. No pain with hip ROM. Full range of motion. No tenderness to palpation. No tenderness to palpation at the sciatic notch. SI joints non-tender. Trochanters non tender.     Decreased sensation on the L in an S1 distribution.     Positive Straight Leg Raise on the Left.     MOTOR:      Biceps  Triceps Deltoids Wrist Ext Wrist Flex Hand Intrin   Right 5/5 5/5 5/5 5/5 5/5 5/5   Left 5/5 5/5 5/5 5/5 5/5 5/5             Hip Flex  Quads Hamstrings Ankle DF EHL Ankle PF   Right 5/5 5/5 5/5 5/5 5/5 5/5   Left 5/5 5/5 5/5 5/5 5/5 5/5     RADIOGRAPHS  Lumbar MRI images taken on 8/27/2020 personally reviewed with patient:  Alignment: Intact lordosis  Vertebral body height: Normal  Marrow signal: Unremarkable  Disc spaces: Preserved height and signal intensity  Conus: Terminates at T12-L1     Axial imaging correlation:     T11-12: There is mild signal loss reflecting some early intradiscal degenerative  change     L1-2: Patent canal and foramina.     L2-3: Patent canal and foramina.     L3-4: Signal loss is identified within the disc space.  Disc space is not  narrowed. Central canal neuroforamina bilaterally patent. L4-5: Mild signal loss is identified there is a small central protrusion-type  Herniation. There is some increased signal in the synovium of the left L4-5 disc space not seen on adjacent levels consistent with some active inflammation of the synovial lining here may be the most significant pain generator on this exam.     Left and right neuroforamina are spared.     L5-S1: moderate signal loss mild narrowing of the disks present     Incidental anterior herniation and osteophytic formation also identified     Endplates demonstrate decreased signal T1 and increased on T2 consistent with Modic endplate changes reflecting active inflammatory change     The central canal is intact     The left neuroforamina demonstrates herniation osteophyte extending into the  neuroforamina. In addition there are some mild facet arthropathy. These changes appear to be compressing the exiting L5 root on the more lateral sagittal cuts. See image 4 series 3. These changes do not extend into the central canal to cause mass effect on the exiting left S1 nerve root     Right neuroforamina demonstrates similar osteophyte herniation call, defect  however now smaller. Defect does touch inferior surface of the exiting right L5  nerve root. Nerve does not appear compressed or edematous.      Other structures: Unremarkable.        IMPRESSION  IMPRESSION:     L5-S1 demonstrates degenerative changes and left larger than right herniations  into the neuroforamina compression of the left L5 nerve root  Active inflammation around the L5-S1 disc space  Active inflammation around the left L4-5 facet synovial lining    Lumbar x-ray images taken on 4/30/2020 personally reviewed with patient:  FINDINGS: 5 views of the lumbar spine obtained. Vertebral body heights  preserved. Disc space loss at L5-S1. Lower lumbar endplate spurring. No  significant listhesis. Lumbar lordosis maintained.  No discrete pars defects  identified. Lower lumbar mild facet arthropathy. No acute fracture identified.     IMPRESSION  IMPRESSION:     No clearly acute findings. Notable lower lumbar degenerative changes. 10 minutes of face-to-face contact were spent with the patient during today's visit extensively discussing symptoms and treatment plan. All questions were answered. More than half of this visit today was spent on counseling.      Written by Cecilia Crockett as dictated by Stephanie Rodríguez MD

## 2020-12-10 DIAGNOSIS — R20.2 NUMBNESS AND TINGLING OF LEFT LEG: ICD-10-CM

## 2020-12-10 DIAGNOSIS — M54.16 LUMBAR RADICULOPATHY: ICD-10-CM

## 2020-12-10 DIAGNOSIS — R20.0 NUMBNESS AND TINGLING OF LEFT LEG: ICD-10-CM

## 2020-12-15 ENCOUNTER — APPOINTMENT (OUTPATIENT)
Dept: GENERAL RADIOLOGY | Age: 55
End: 2020-12-15
Attending: PHYSICAL MEDICINE & REHABILITATION
Payer: MEDICARE

## 2020-12-15 ENCOUNTER — HOSPITAL ENCOUNTER (OUTPATIENT)
Age: 55
Setting detail: OUTPATIENT SURGERY
Discharge: HOME | End: 2020-12-15
Attending: PHYSICAL MEDICINE & REHABILITATION | Admitting: PHYSICAL MEDICINE & REHABILITATION
Payer: MEDICARE

## 2020-12-15 VITALS
TEMPERATURE: 98.5 F | HEART RATE: 99 BPM | DIASTOLIC BLOOD PRESSURE: 89 MMHG | SYSTOLIC BLOOD PRESSURE: 149 MMHG | RESPIRATION RATE: 18 BRPM | OXYGEN SATURATION: 98 %

## 2020-12-15 PROCEDURE — 74011250637 HC RX REV CODE- 250/637: Performed by: PHYSICAL MEDICINE & REHABILITATION

## 2020-12-15 PROCEDURE — 64483 NJX AA&/STRD TFRM EPI L/S 1: CPT | Performed by: PHYSICAL MEDICINE & REHABILITATION

## 2020-12-15 PROCEDURE — 74011000636 HC RX REV CODE- 636: Performed by: PHYSICAL MEDICINE & REHABILITATION

## 2020-12-15 PROCEDURE — 77030003669 HC NDL SPN COOK -B: Performed by: PHYSICAL MEDICINE & REHABILITATION

## 2020-12-15 PROCEDURE — 74011250636 HC RX REV CODE- 250/636: Performed by: PHYSICAL MEDICINE & REHABILITATION

## 2020-12-15 PROCEDURE — 77030003672 HC NDL SPN HALY -A: Performed by: PHYSICAL MEDICINE & REHABILITATION

## 2020-12-15 PROCEDURE — 76010000009 HC PAIN MGT 0 TO 30 MIN PROC: Performed by: PHYSICAL MEDICINE & REHABILITATION

## 2020-12-15 PROCEDURE — 74011000250 HC RX REV CODE- 250: Performed by: PHYSICAL MEDICINE & REHABILITATION

## 2020-12-15 PROCEDURE — 77030039433 HC TY MYLEOGRAM BD -B: Performed by: PHYSICAL MEDICINE & REHABILITATION

## 2020-12-15 PROCEDURE — 2709999900 HC NON-CHARGEABLE SUPPLY: Performed by: PHYSICAL MEDICINE & REHABILITATION

## 2020-12-15 RX ORDER — LIDOCAINE HYDROCHLORIDE 10 MG/ML
INJECTION, SOLUTION EPIDURAL; INFILTRATION; INTRACAUDAL; PERINEURAL AS NEEDED
Status: DISCONTINUED | OUTPATIENT
Start: 2020-12-15 | End: 2020-12-15 | Stop reason: HOSPADM

## 2020-12-15 RX ORDER — DIAZEPAM 5 MG/1
5-20 TABLET ORAL ONCE
Status: COMPLETED | OUTPATIENT
Start: 2020-12-15 | End: 2020-12-15

## 2020-12-15 RX ORDER — DEXAMETHASONE SODIUM PHOSPHATE 100 MG/10ML
INJECTION INTRAMUSCULAR; INTRAVENOUS AS NEEDED
Status: DISCONTINUED | OUTPATIENT
Start: 2020-12-15 | End: 2020-12-15 | Stop reason: HOSPADM

## 2020-12-15 RX ADMIN — DIAZEPAM 5 MG: 5 TABLET ORAL at 09:17

## 2020-12-15 NOTE — INTERVAL H&P NOTE
Update History & Physical 
 
The Patient's History and Physical of December 3,  
2020 was reviewed. There was no change. The surgical site was confirmed by the patient and me. Plan:  The risk, benefits, expected outcome, and alternative to the recommended procedure have been discussed with the patient. Patient understands and wants to proceed with the procedure.  
 
Electronically signed by Aravind Case MD on 12/15/2020 at 10:21 AM

## 2020-12-15 NOTE — DISCHARGE INSTRUCTIONS
[facility]   ([place])  Dr. Leana Cheadle, Dr. Burton Tineo, Dr. Viashnavi Mireles Spinal Procedure (Block) Instructions    * Do not drive a car, operate heavy machinery or dangerous equipment, or make important decisions for 12-24 hours. * Light activity as tolerated; may rest for the remainder of the day. * Resume pre-block medications including those from your other doctors. * Do not drink alcoholic beverages for 24 hours. Alcohol and the medications you have received may interact and cause an adverse reaction. * You may feel better this evening and worse tomorrow, as the numbing medications wears off and the steroid has yet to begin to work. After 48-72 hrs the steroid should begin to release bringing you relief. If you had a medial branch block, no steroids were used. The medial branch block is a test to see if you are a candidate for radiofrequency ablation (RFA). The anesthetic (numbing medicine)  will wear off by the next day. * You may shower this evening and remove any bandages. * Avoid hot tubs/pools/tub soaks and heating pads for 24 hours. You may use cold packs on the procedure site as tolerated for the first 24 hours. * If a headache develops, drink plenty of fluids and rest.  Take over the counter medications for headache if needed. If the headache continues longer than 24 hours, call MD at the [address] [place] [address] Avenue. [phone]    * Continue taking pain medications as needed. * You may resume your regular diet if tolerated. Otherwise, start with sips of water and advance slowly. * If Diabetic: check your blood sugar three times a day for the next 3 days. If your sugar is greater than 300 call your family doctor. If your sugar is greater than 400, have someone transport you to the nearest Emergency Room. * If you experience any of the following problems, Please Call the [address] [place] [address] Avenue at [phone].         * Excessive pain, swelling, redness or odor at or around the surgical area    * Fever of 101 or higher    * Nausea / Vomiting lasting longer than 4 hours or if unable to take medications. * Severe Headache    * Weakness or numbness in arms or legs that is not      resolving   * Any NEW signs of decreased circulation or nerve impairment in leg: change in color, swelling, persistent numbness, tingling                    * Prolonged increase in pain greater than 4 days      PATIENT INSTRUCTIONS:    After oral sedation, for 12-24 hours or while taking prescription Narcotics:  · Limit your activities  · Do not drive and operate hazardous machinery  · Do not make important personal or business decisions  · Do  not drink alcoholic beverages  · If you have not urinated within 8 hours after discharge, please contact your surgeon on call. *  Please give a list of your current medications to your Primary Care Provider. *  Please update this list whenever your medications are discontinued, doses are      changed, or new medications (including over-the-counter products) are added. *  Please carry medication information at all times in case of emergency situations. These are general instructions for a healthy lifestyle:    No smoking/ No tobacco products/ Avoid exposure to second hand smoke    Surgeon General's Warning:  Quitting smoking now greatly reduces serious risk to your health. Obesity, smoking, and sedentary lifestyle greatly increases your risk for illness    A healthy diet, regular physical exercise & weight monitoring are important for maintaining a healthy lifestyle    You may be retaining fluid if you have a history of heart failure or if you experience any of the following symptoms:  Weight gain of 3 pounds or more overnight or 5 pounds in a week, increased swelling in our hands or feet or shortness of breath while lying flat in bed.   Please call your doctor as soon as you notice any of these symptoms; do not wait until your next office visit. Recognize signs and symptoms of STROKE:    F-face looks uneven    A-arms unable to move or move unevenly    S-speech slurred or non-existent    T-time-call 911 as soon as signs and symptoms begin-DO NOT go       Back to bed or wait to see if you get better-TIME IS BRAIN.

## 2020-12-15 NOTE — PROCEDURES
SELECTIVE NERVE ROOT BLOCK PROCEDURE NOTE      Patient Name: Merary Ledezma  Date of Procedure: December 15, 2020  Preoperative Diagnosis:  Lumbar radiculopathy [M54.16]  Post Operative Diagnosis:  Lumbar radiculopathy [M54.16]  Location:  [facility], Special Procedures Unit, [address] [place] [address]    Procedure :    left L5 Selective Nerve Root Block      Consent:  Informed consent was obtained prior to the procedure. The patient was given the opportunity to ask questions regarding the procedure and its associated risks. In addition to the potential risks associated with the procedure itself, the patient was informed both verbally and in writing of the potential side effects of the use of glucocorticoid. The patient appeared to comprehend the informed consent and desired to have the procedure performed. The patient was counseled at length about the risks of remington Covid-19 during their perioperative period and any recovery window from their procedure. The patient was made aware that remington Covid-19  may worsen their prognosis for recovering from their procedure and lend to a higher morbidity and/or mortality risk. All material risks, benefits, and reasonable alternatives including postponing the procedure were discussed. The patient does  wish to proceed with the procedure at this time. Procedure: The patient was placed in the prone position on the fluoroscopy table and the back was prepped and draped in the usual sterile manner. The exact spinal level was  identified using fluoroscopy, and Lidocaine 1 % was injected locally, a  22 gauge #5 spinal needle was passed to the transverse process. The depth was noted and the needle redirected to pass inferior and approximately one cm anterior to the transverse process.     YES  1 cc of Isovue M-200 was used to verify positioning in the epidural and paravertebral space and outlined the course of the spinal nerve into the epidural space.  The same procedure was repeated at each spinal level indicated above. No vascular uptake was identified. A total of 10 mg of preservative free Dexamethasone and 1 cc of Lidocaine/site was slowly injected. The patient tolerated the procedure well. The injection area was cleaned and bandaids applied. Not excessive bleeding was noted. Patient dressed and discharged to home with instructions. Discussion: The patient tolerated the procedure well.                                               Arianne Obregon MD  December 15, 2020

## 2021-01-08 ENCOUNTER — OFFICE VISIT (OUTPATIENT)
Dept: ORTHOPEDIC SURGERY | Age: 56
End: 2021-01-08
Payer: MEDICARE

## 2021-01-08 VITALS
HEIGHT: 72 IN | TEMPERATURE: 97.8 F | SYSTOLIC BLOOD PRESSURE: 151 MMHG | BODY MASS INDEX: 36.3 KG/M2 | OXYGEN SATURATION: 97 % | DIASTOLIC BLOOD PRESSURE: 98 MMHG | HEART RATE: 103 BPM | WEIGHT: 268 LBS | RESPIRATION RATE: 26 BRPM

## 2021-01-08 DIAGNOSIS — M54.16 ACUTE LEFT LUMBAR RADICULOPATHY: ICD-10-CM

## 2021-01-08 DIAGNOSIS — R20.0 NUMBNESS AND TINGLING OF LEFT LEG: Primary | ICD-10-CM

## 2021-01-08 DIAGNOSIS — R20.2 NUMBNESS AND TINGLING OF LEFT LEG: Primary | ICD-10-CM

## 2021-01-08 DIAGNOSIS — M54.16 LUMBAR RADICULOPATHY: ICD-10-CM

## 2021-01-08 DIAGNOSIS — R94.131 ABNORMAL EMG: ICD-10-CM

## 2021-01-08 PROCEDURE — G8432 DEP SCR NOT DOC, RNG: HCPCS | Performed by: PHYSICAL MEDICINE & REHABILITATION

## 2021-01-08 PROCEDURE — 95908 NRV CNDJ TST 3-4 STUDIES: CPT | Performed by: PHYSICAL MEDICINE & REHABILITATION

## 2021-01-08 PROCEDURE — 3017F COLORECTAL CA SCREEN DOC REV: CPT | Performed by: PHYSICAL MEDICINE & REHABILITATION

## 2021-01-08 PROCEDURE — 99213 OFFICE O/P EST LOW 20 MIN: CPT | Performed by: PHYSICAL MEDICINE & REHABILITATION

## 2021-01-08 PROCEDURE — G8427 DOCREV CUR MEDS BY ELIG CLIN: HCPCS | Performed by: PHYSICAL MEDICINE & REHABILITATION

## 2021-01-08 PROCEDURE — 95886 MUSC TEST DONE W/N TEST COMP: CPT | Performed by: PHYSICAL MEDICINE & REHABILITATION

## 2021-01-08 PROCEDURE — G8417 CALC BMI ABV UP PARAM F/U: HCPCS | Performed by: PHYSICAL MEDICINE & REHABILITATION

## 2021-01-08 NOTE — PROGRESS NOTES
[address]  [address]  [address]  Phone: [phone]  Fax: [phone]        Nirmala Kim  : 1965  PCP: Roel Byers MD  2021    ELECTROMYOGRAPHY AND NERVE CONDUCTION STUDIES    Veronica Owen was referred by Dr. Mague Venegas for electrodiagnostic evaluation of LLE paraesthesia. NCV & EMG Findings:  Evaluation of the left Sup Fibular sensory nerve showed no response (14 cm), no response (Site 2), and no response (Site 3). The left sural sensory nerve showed no response (Calf), no response (Site 2), and no response (Site 3). All remaining nerves (as indicated in the following tables) were within normal limits. Needle evaluation of the left gluteus medius muscle showed moderately increased polyphasic potentials. The left anterior tibialis muscle showed increased motor unit amplitude and slightly increased polyphasic potentials. The left posterior tibialis muscle showed slightly increased spontaneous activity, increased motor unit amplitude, and slightly increased polyphasic potentials. All remaining muscles (as indicated in the following table) showed no evidence of electrical instability. INTERPRETATION  This is an abnormal electrodiagnostic examination. These findings may be consistent with:   1. Sensory polyneuropathy - this is based on undetectable sensory responses. 2. Chronic left L5 radiculopathy - this is based on signs of chronicity throughout the L5 myotome. CLINICAL INTERPRETATION  His electrodiagnostic finding of lumbar radiculopathy appears consistent with his leg pain and imaging studies. His peripheral neuropathy appears asymptomatic at this time. He feels that he has not found enough relief with conservative treatments so far. PLAN  1. Referral to Dr. Harley Arnold for surgical consult. Pt will f/u with Dr. Harley Arnold. HISTORY OF PRESENT ILLNESS  Veronica Owen is a 54 y. o. male.    Pt presents today for LLE EMG evaluation of LLE paraesthesia in an L5 distribution. The majority of his symptoms are localized to the left buttock currently. He found benefit from a second left L5 TFESI (12/15/2020; Dr. David Gonzalez) for about 5 days. He also found improvement from Amitriptyline 75 mg QHS. PAST MEDICAL HISTORY   Past Medical History:   Diagnosis Date    Anemia     Hypertension     Stroke [facility])        Past Surgical History:   Procedure Laterality Date    COLONOSCOPY     . MEDICATIONS    Current Outpatient Medications   Medication Sig Dispense Refill    amitriptyline (ELAVIL) 25 mg tablet Take 3 Tabs by mouth nightly. 90 Tab 2    lisinopriL (PRINIVIL, ZESTRIL) 5 mg tablet TAKE ONE TABLET BY MOUTH DAILY      rosuvastatin (CRESTOR) 40 mg tablet Take 1 Tab by mouth nightly.  pregabalin (LYRICA) 300 mg capsule Take 1 Cap by mouth two (2) times a day. Max Daily Amount: 600 mg. 60 Cap 2    traMADoL (ULTRAM) 50 mg tablet Take  mg by mouth every eight (8) hours as needed.  acetaminophen (Tylenol Extra Strength) 500 mg tablet Take 2 Tabs by mouth every six (6) hours as needed for Pain. 20 Tab 0    atorvastatin (LIPITOR) 80 mg tablet Take 80 mg by mouth daily.             ALLERGIES    Allergies   Allergen Reactions    Codeine Other (comments)     VOMITING          SOCIAL HISTORY    Social History     Socioeconomic History    Marital status: SINGLE     Spouse name: Not on file    Number of children: Not on file    Years of education: Not on file    Highest education level: Not on file   Tobacco Use    Smoking status: Current Every Day Smoker     Packs/day: 1.00     Years: 15.00     Pack years: 15.00    Smokeless tobacco: Current User   Substance and Sexual Activity    Alcohol use: Not Currently     Alcohol/week: 0.8 standard drinks     Types: 1 Cans of beer per week     Comment: states that he drinks 3 times per day    Drug use: No    Sexual activity: Not Currently Social History Narrative    ** Merged History Encounter **            FAMILY HISTORY    Family History   Problem Relation Age of Onset    Hypertension Mother     High Cholesterol Mother     Elevated Lipids Mother     COPD Father     Heart Failure Brother     Diabetes Brother          PHYSICAL EXAMINATION  Visit Vitals  BP (!) 151/98 (BP 1 Location: Left arm, BP Patient Position: Sitting) Comment: pt asymptomatic, MD aware   Pulse (!) 103 Comment: pt asymptomatic, MD aware   Temp 97.8 °F (36.6 °C) (Oral)   Resp 26   Ht 6' (1.829 m)   Wt 268 lb (121.6 kg)   SpO2 97% Comment: RA   BMI 36.35 kg/m²       Pain Assessment  1/8/2021   Location of Pain Leg   Location Modifiers Left   Severity of Pain 2   Quality of Pain Other (Comment)   Quality of Pain Comment stabbing   Duration of Pain Persistent   Frequency of Pain Intermittent   Aggravating Factors Other (Comment)   Aggravating Factors Comment pain just comes and goes when it wants   Limiting Behavior Yes   Relieving Factors Other (Comment)   Relieving Factors Comment lay on side for a little bit   Result of Injury No           Constitutional:  Well developed, well nourished, in no acute distress. Psychiatric: Affect and mood are appropriate. Integumentary: No rashes or abrasions noted on exposed areas. SPINE/MUSCULOSKELETAL EXAM    On brief examination: None.       NCV & EMG Findings:  Nerve Conduction Studies  Anti Sensory Summary Table     Stim Site NR Peak (ms) Norm Peak (ms) O-P Amp (µV) Norm O-P Amp Site1 Site2 Delta-P (ms) Dist (cm) Leobardo (m/s) Norm Leobardo (m/s)   Left Sup Fibular Anti Sensory (Ant Lat Mall)   14 cm NR  <4.4  >5.0 14 cm Ant Lat Mall  14.0  >32   Site 2 NR             Site 3 NR             Left Sural Anti Sensory (Lat Mall)   Calf NR  <4.0  >5.0 Calf Lat Mall  14.0  >35   Site 2 NR             Site 3 NR               Motor Summary Table     Stim Site NR Onset (ms) Norm Onset (ms) O-P Amp (mV) Norm O-P Amp Site1 Site2 Delta-0 (ms) Dist (cm) Leobardo (m/s) Norm Leobardo (m/s)   Left Fibular Motor (Ext Dig Brev)   Ankle    4.4 <6.1 5.7 >2.5 B Fib Ankle 8.3 34.5 42 >38   B Fib    12.7  5.0  Poplt B Fib 1.1 6.5 59 >40   Poplt    13.8  4.8          Left Tibial Motor (Abd Dill Brev)   Ankle    4.1 <6.1 5.7 >3.0 Knee Ankle 10.1 0.0  >35   Knee    14.2  2.5            EMG     Side Muscle Nerve Root Ins Act Fibs Psw Amp Dur Poly Recrt Int Kennis Merck Comment   Left Lumbo Parasp Up Rami L1-2 Nml Nml Nml         Left Lumbo Parasp Mid Rami L3-4 Nml Nml Nml         Left Lumbo Parasp Low Rami L5-S1 Nml Nml Nml         Left GluteusMed SupGluteal L5-S1 Nml Nml Nml Nml Nml 2+ Nml Nml    Left TensorFascLat SupGluteal L4-5, S1 Nml Nml Nml Nml Nml 0 Nml Nml    Left VastusMed Femoral L2-4 Nml Nml Nml Nml Nml 0 Nml Nml    Left AntTibialis Dp Br Fibular L4-5 Nml Nml Nml Incr Nml 1+ Nml Nml    Left Gastroc Tibial S1-2 Nml Nml Nml Nml Nml 0 Nml Nml    Left PostTibialis Tibial L5, S1 Nml Nml 1+ Incr Nml 1+ Nml Nml        Nerve Conduction Studies  Anti Sensory Left/Right Comparison     Stim Site L Lat (ms) R Lat (ms) L-R Lat (ms) L Amp (µV) R Amp (µV) L-R Amp (%) Site1 Site2 L Leobardo (m/s) R Leobardo (m/s) L-R Leobardo (m/s)   Sup Fibular Anti Sensory (Ant Lat Mall)   14 cm       14 cm Ant Lat Mall      Site 2              Site 3              Sural Anti Sensory (Lat Mall)   Calf       Calf Lat Mall      Site 2              Site 3                Motor Left/Right Comparison     Stim Site L Lat (ms) R Lat (ms) L-R Lat (ms) L Amp (mV) R Amp (mV) L-R Amp (%) Site1 Site2 L Leobardo (m/s) R Leobardo (m/s) L-R Leobardo (m/s)   Fibular Motor (Ext Dig Brev)   Ankle 4.4   5.7   B Fib Ankle 42     B Fib 12.7   5.0   Poplt B Fib 59     Poplt 13.8   4.8          Tibial Motor (Abd Dill Brev)   Ankle 4.1   5.7   Knee Ankle      Knee 14.2   2.5                Waveforms:                  [facility]  OFFICE PROCEDURE PROGRESS NOTE        Chart reviewed for the following:   I, Cosmo Grammes, have reviewed the History, Physical and updated the Allergic reactions for [address] performed immediately prior to start of procedure:   Milind Osborne, have performed the following reviews on [place] prior to the start of the procedure:            * Patient was identified by name and date of birth   * Agreement on procedure being performed was verified  * Risks and Benefits explained to the patient  * Procedure site verified and marked as necessary  * Patient was positioned for comfort  * Consent was signed and verified     Time: 9:42 AM    Date of procedure: 1/8/2021    Procedure performed by:  Spring Olivier MD    Provider accompanied by: Sandrine. Patient accompanied by: Self.     How tolerated by patient: tolerated the procedure well with no complications    Post Procedural Pain Scale: 0 - No Hurt    Comments: none    Written by Zafar Bass, [address] as dictated by Camilo Coy MD

## 2021-01-08 NOTE — PATIENT INSTRUCTIONS
High Blood Pressure: Care Instructions Overview It's normal for blood pressure to go up and down throughout the day. But if it stays up, you have high blood pressure. Another name for high blood pressure is hypertension. Despite what a lot of people think, high blood pressure usually doesn't cause headaches or make you feel dizzy or lightheaded. It usually has no symptoms. But it does increase your risk of stroke, heart attack, and other problems. You and your doctor will talk about your risks of these problems based on your blood pressure. Your doctor will give you a goal for your blood pressure. Your goal will be based on your health and your age. Lifestyle changes, such as eating healthy and being active, are always important to help lower blood pressure. You might also take medicine to reach your blood pressure goal. 
Follow-up care is a key part of your treatment and safety. Be sure to make and go to all appointments, and call your doctor if you are having problems. It's also a good idea to know your test results and keep a list of the medicines you take. How can you care for yourself at home? Medical treatment · If you stop taking your medicine, your blood pressure will go back up. You may take one or more types of medicine to lower your blood pressure. Be safe with medicines. Take your medicine exactly as prescribed. Call your doctor if you think you are having a problem with your medicine. · Talk to your doctor before you start taking aspirin every day. Aspirin can help certain people lower their risk of a heart attack or stroke. But taking aspirin isn't right for everyone, because it can cause serious bleeding. · See your doctor regularly. You may need to see the doctor more often at first or until your blood pressure comes down. · If you are taking blood pressure medicine, talk to your doctor before you take decongestants or anti-inflammatory medicine, such as ibuprofen. Some of these medicines can raise blood pressure. · Learn how to check your blood pressure at home. Lifestyle changes · Stay at a healthy weight. This is especially important if you put on weight around the waist. Losing even 10 pounds can help you lower your blood pressure. · If your doctor recommends it, get more exercise. Walking is a good choice. Bit by bit, increase the amount you walk every day. Try for at least 30 minutes on most days of the week. You also may want to swim, bike, or do other activities. · Avoid or limit alcohol. Talk to your doctor about whether you can drink any alcohol. · Try to limit how much sodium you eat to less than 2,300 milligrams (mg) a day. Your doctor may ask you to try to eat less than 1,500 mg a day. · Eat plenty of fruits (such as bananas and oranges), vegetables, legumes, whole grains, and low-fat dairy products. · Lower the amount of saturated fat in your diet. Saturated fat is found in animal products such as milk, cheese, and meat. Limiting these foods may help you lose weight and also lower your risk for heart disease. · Do not smoke. Smoking increases your risk for heart attack and stroke. If you need help quitting, talk to your doctor about stop-smoking programs and medicines. These can increase your chances of quitting for good. When should you call for help? Call  911 anytime you think you may need emergency care. This may mean having symptoms that suggest that your blood pressure is causing a serious heart or blood vessel problem. Your blood pressure may be over 180/120. For example, call 911 if: 
  · You have symptoms of a heart attack. These may include: 
? Chest pain or pressure, or a strange feeling in the chest. 
? Sweating. ? Shortness of breath. ? Nausea or vomiting. ? Pain, pressure, or a strange feeling in the back, neck, jaw, or upper belly or in one or both shoulders or arms. ? Lightheadedness or sudden weakness. ? A fast or irregular heartbeat.  
  · You have symptoms of a stroke. These may include: 
? Sudden numbness, tingling, weakness, or loss of movement in your face, arm, or leg, especially on only one side of your body. ? Sudden vision changes. ? Sudden trouble speaking. ? Sudden confusion or trouble understanding simple statements. ? Sudden problems with walking or balance. ? A sudden, severe headache that is different from past headaches.  
  · You have severe back or belly pain. Do not wait until your blood pressure comes down on its own. Get help right away. Call your doctor now or seek immediate care if: 
  · Your blood pressure is much higher than normal (such as 180/120 or higher), but you don't have symptoms.  
  · You think high blood pressure is causing symptoms, such as: 
? Severe headache. 
? Blurry vision. Watch closely for changes in your health, and be sure to contact your doctor if: 
  · Your blood pressure measures higher than your doctor recommends at least 2 times. That means the top number is higher or the bottom number is higher, or both.  
  · You think you may be having side effects from your blood pressure medicine. Where can you learn more? Go to http://www.gray.com/ Enter K390 in the search box to learn more about \"High Blood Pressure: Care Instructions. \" Current as of: December 16, 2019               Content Version: 12.6 © 7614-3448 [employer]. Care instructions adapted under license by [employer] (which disclaims liability or warranty for this information). If you have questions about a medical condition or this instruction, always ask your healthcare professional. [address] any warranty or liability for your use of this information.

## 2021-01-08 NOTE — PROGRESS NOTES
Yuan Cervantes presents today for   Chief Complaint   Patient presents with    Leg Pain     left       Is someone accompanying this pt? no    Is the patient using any DME equipment during OV? no    Depression Screening:  3 most recent PHQ Screens 7/27/2020   Little interest or pleasure in doing things Not at all   Feeling down, depressed, irritable, or hopeless Not at all   Total Score PHQ 2 0       Learning Assessment:  Learning Assessment 7/27/2020   PRIMARY LEARNER Patient   HIGHEST LEVEL OF EDUCATION - PRIMARY LEARNER  GRADUATED HIGH SCHOOL OR GED   BARRIERS PRIMARY LEARNER VISUAL     COGNITIVE   CO-LEARNER CAREGIVER No   PRIMARY LANGUAGE ENGLISH   LEARNER PREFERENCE PRIMARY OTHER (COMMENT)   ANSWERED BY PATIENT   RELATIONSHIP SELF       Abuse Screening:  Abuse Screening Questionnaire 7/27/2020   Do you ever feel afraid of your partner? N   Are you in a relationship with someone who physically or mentally threatens you? N   Is it safe for you to go home? Y         Coordination of Care:  1. Have you been to the ER, urgent care clinic since your last visit? no  Hospitalized since your last visit? no    2. Have you seen or consulted any other health care providers outside of the [address] [place] [address] since your last visit? no Include any pap smears or colon screening.  no

## 2021-08-26 ENCOUNTER — OFFICE VISIT (OUTPATIENT)
Dept: ORTHOPEDIC SURGERY | Age: 56
End: 2021-08-26
Payer: MEDICARE

## 2021-08-26 VITALS
TEMPERATURE: 98.4 F | HEIGHT: 72 IN | BODY MASS INDEX: 38.49 KG/M2 | RESPIRATION RATE: 18 BRPM | OXYGEN SATURATION: 97 % | WEIGHT: 284.2 LBS | HEART RATE: 88 BPM

## 2021-08-26 DIAGNOSIS — M54.16 LUMBAR RADICULOPATHY: Primary | ICD-10-CM

## 2021-08-26 PROCEDURE — G8432 DEP SCR NOT DOC, RNG: HCPCS | Performed by: PHYSICAL MEDICINE & REHABILITATION

## 2021-08-26 PROCEDURE — 99213 OFFICE O/P EST LOW 20 MIN: CPT | Performed by: PHYSICAL MEDICINE & REHABILITATION

## 2021-08-26 PROCEDURE — G8417 CALC BMI ABV UP PARAM F/U: HCPCS | Performed by: PHYSICAL MEDICINE & REHABILITATION

## 2021-08-26 PROCEDURE — G8427 DOCREV CUR MEDS BY ELIG CLIN: HCPCS | Performed by: PHYSICAL MEDICINE & REHABILITATION

## 2021-08-26 PROCEDURE — 3017F COLORECTAL CA SCREEN DOC REV: CPT | Performed by: PHYSICAL MEDICINE & REHABILITATION

## 2021-08-26 RX ORDER — METFORMIN HYDROCHLORIDE 500 MG/1
1000 TABLET, EXTENDED RELEASE ORAL DAILY
COMMUNITY
Start: 2021-01-18

## 2021-08-26 RX ORDER — DULOXETIN HYDROCHLORIDE 30 MG/1
30 CAPSULE, DELAYED RELEASE ORAL DAILY
Qty: 30 CAPSULE | Refills: 2 | Status: CANCELLED | OUTPATIENT
Start: 2021-08-26

## 2021-08-26 RX ORDER — GABAPENTIN 600 MG/1
1200 TABLET ORAL 3 TIMES DAILY
Qty: 180 TABLET | Refills: 5 | Status: SHIPPED | OUTPATIENT
Start: 2021-08-26 | End: 2021-10-07 | Stop reason: SDUPTHER

## 2021-08-26 RX ORDER — GABAPENTIN 600 MG/1
TABLET ORAL 3 TIMES DAILY
COMMUNITY
End: 2021-08-26 | Stop reason: DRUGHIGH

## 2021-08-26 NOTE — PROGRESS NOTES
[address], [address]  [facility], [address]  Phone: [phone]  Fax: [phone]        [place]  : 1965  PCP: Carly James MD  2021    PROGRESS NOTE      HISTORY OF PRESENT ILLNESS  Jorge Price is a 64 y.o. male who was seen as a new patient 2020 with c/o hx of stroke(speech deficit) c/o left low leg and low back pain x 4 weeks. He had been doing yard work planting bulbs for a few days prior to his symptoms. Since onset, it worsened despite having gone to the ED twice. None of the medications helped including medrol, muscle relaxers, NSAIDS, tylenol. He had constant symptoms that were provoked mainly by standing and walking. Sitting and laying were positions of comfort. The symptoms ran from the buttock to the side of the leg and top of the foot. His strength was intact, and he had no sensory deficit at the time. He continued to have low back pain radiating into the LLE into the top of the foot. He did not find improvement from a Prednisone dose pack. He did not see much benefit from Gabapentin 900 mg TID. Pt returned with increased symptoms of low back pain radiating into the LLE. He found some benefit initially from Lyrica 225 mg BID but noted that it did not last very long. Pt has been intolerant to [address] secondary to vomiting, blurry vision. [facility] with some benefit. Lumbar spine MRI dated 2020 reviewed. Per report, L5-S1 demonstrates degenerative changes and left larger than right herniations into the neuroforamina compression of the left L5 nerve root. Active inflammation around the L5-S1 disc space. Active inflammation around the left L4-5 facet synovial lining. He underwent a left L5 TFESI (2020; Dr. Chantell Zhengiper some relief of his LLE pain. He continues to have some residual LLE symptoms, especially in the left buttock, but overall, it has improved.  He continued to have LLE paraesthesia to the calf. He found some benefit with Prednisone. He found benefit from a second left L5 TFESI (12/15/2020; Dr. Tayla Arias) for about 5 days. He also found improvement from Amitriptyline 75 mg QHS. A LLE EMG dated 1/8/21 was suggestive of:  1. Sensory polyneuropathy - this is based on undetectable sensory responses. 2. Chronic left L5 radiculopathy - this is based on signs of chronicity throughout the L5 myotome. Prem Aguirre comes in to the office today for f/u. He saw Dr. Elaine Nina and opted to not proceed with surgery. He needs a refill of his Gabapentin- he has been tolerant to taking 1200 mg BID. He stopped Amitriptyline as he states it was ineffective. Pain Score: 4/10. Treatments patient has tried:  Physical Therapy - yes 2020  Medications - Gabapentin, Prednisone, LYRICA, MDP, muscle relaxants, NSAIDs  Injections - Left L5 TFESI (9/22/2020) with benefit; left L5 (12/15/20)     PmHx: stroke (speech deficits)     ASSESSMENT  This is a 54year-old male with acute low back pain radiating into the LLE. His symptoms are likely due to a left L5/S1 radiculopathy. He had a positive SLR, weakness with ankle PF, and decreased sensation in an S1 distribution on the L.     PLAN  1. Increase Gabapentin to 1200 mg TID. 2. We may consider adding Cymbalta. Pt will f/u in 6 weeks VIRTUALLY or sooner as needed. Diagnoses and all orders for this visit:    1. Lumbar radiculopathy  -     gabapentin (NEURONTIN) 600 mg tablet; Take 2 Tablets by mouth three (3) times daily. Max Daily Amount: 3,600 mg. PAST MEDICAL HISTORY   Past Medical History:   Diagnosis Date    Anemia     Hypertension     Stroke [facility])        Past Surgical History:   Procedure Laterality Date    COLONOSCOPY     . MEDICATIONS      Current Outpatient Medications   Medication Sig Dispense Refill    metFORMIN ER (GLUCOPHAGE XR) 500 mg tablet Take 1,000 mg by mouth daily.       lisinopriL (PRINIVIL, ZESTRIL) 5 mg tablet TAKE ONE TABLET BY MOUTH DAILY      rosuvastatin (CRESTOR) 40 mg tablet Take 1 Tab by mouth nightly.  atorvastatin (LIPITOR) 80 mg tablet Take 80 mg by mouth daily.  amitriptyline (ELAVIL) 25 mg tablet Take 3 Tabs by mouth nightly. 90 Tab 2    pregabalin (LYRICA) 300 mg capsule Take 1 Cap by mouth two (2) times a day. Max Daily Amount: 600 mg. (Patient not taking: Reported on 8/26/2021) 60 Cap 2    traMADoL (ULTRAM) 50 mg tablet Take  mg by mouth every eight (8) hours as needed. (Patient not taking: Reported on 8/26/2021)      acetaminophen (Tylenol Extra Strength) 500 mg tablet Take 2 Tabs by mouth every six (6) hours as needed for Pain. (Patient not taking: Reported on 8/26/2021) 20 Tab 0        ALLERGIES  Allergies   Allergen Reactions    Codeine Other (comments)     VOMITING          SOCIAL HISTORY    Social History     Socioeconomic History    Marital status: SINGLE     Spouse name: Not on file    Number of children: Not on file    Years of education: Not on file    Highest education level: Not on file   Tobacco Use    Smoking status: Current Every Day Smoker     Packs/day: 1.00     Years: 15.00     Pack years: 15.00    Smokeless tobacco: Current User   Substance and Sexual Activity    Alcohol use: Not Currently     Alcohol/week: 0.8 standard drinks     Types: 1 Cans of beer per week     Comment: states that he drinks 3 times per day    Drug use: No    Sexual activity: Not Currently   Social History Narrative    ** Merged History Encounter **          Social Determinants of Health     Financial Resource Strain:     Difficulty of Paying Living Expenses:    Food Insecurity:     Worried About Running Out of Food in the Last Year:     Ran Out of Food in the Last Year:    Transportation Needs:     Lack of Transportation (Medical):      Lack of Transportation (Non-Medical):    Physical Activity:     Days of Exercise per Week:     Minutes of Exercise per Session:    Stress:     Feeling of Stress :    Social Connections:     Frequency of Communication with Friends and Family:     Frequency of Social Gatherings with Friends and Family:     Attends [employer] Services:     Active Member of Clubs or Organizations:     Attends Club or Organization Meetings:     Marital Status:        FAMILY HISTORY  Family History   Problem Relation Age of Onset    Hypertension Mother     High Cholesterol Mother     Elevated Lipids Mother     COPD Father     Heart Failure Brother     Diabetes Brother          REVIEW OF SYSTEMS  Review of Systems   Constitutional: Negative for chills, fever and weight loss. Respiratory: Negative for shortness of breath. Cardiovascular: Negative for chest pain. Gastrointestinal: Negative for constipation. Negative for fecal incontinence    Genitourinary: Negative for dysuria. Negative for urinary incontinence   Musculoskeletal: Positive for back pain. Skin: Negative for rash. Neurological: Positive for tingling ( LLE). Negative for dizziness, tremors, focal weakness and headaches. Endo/Heme/Allergies: Does not bruise/bleed easily. Psychiatric/Behavioral: The patient does not have insomnia. PHYSICAL EXAMINATION  Visit Vitals  Pulse 88   Temp 98.4 °F (36.9 °C) (Temporal)   Resp 18   Ht 6' (1.829 m)   Wt 284 lb 3.2 oz (128.9 kg)   SpO2 97%   BMI 38.54 kg/m²       Pain Assessment  8/26/2021   Location of Pain Back;Leg;Buttocks   Location Modifiers Left   Severity of Pain 4   Quality of Pain Aching; Other (Comment)   Quality of Pain Comment numbness and tingling   Duration of Pain Persistent   Frequency of Pain Constant   Aggravating Factors Other (Comment); Walking;Standing   Aggravating Factors Comment -   Limiting Behavior Some   Relieving Factors Rest   Relieving Factors Comment -   Result of Injury -           Constitutional:  Well developed, well nourished, in no acute distress.    Psychiatric: Affect and mood are appropriate. Integumentary: No rashes or abrasions noted on exposed areas. SPINE/MUSCULOSKELETAL EXAM    Lumbar spine:  No rash, ecchymosis, or gross obliquity. No fasciculations. No focal atrophy is noted. No pain with hip ROM. Full range of motion. No tenderness to palpation. No tenderness to palpation at the sciatic notch. SI joints non-tender. Trochanters non tender.     Decreased sensation on the L in an S1 distribution.     Positive Straight Leg Raise on the Left.     MOTOR:      Biceps  Triceps Deltoids Wrist Ext Wrist Flex Hand Intrin   Right 5/5 5/5 5/5 5/5 5/5 5/5   Left 5/5 5/5 5/5 5/5 5/5 5/5             Hip Flex  Quads Hamstrings Ankle DF EHL Ankle PF   Right 5/5 5/5 5/5 5/5 5/5 5/5   Left 5/5 5/5 5/5 5/5 5/5 5/5     RADIOGRAPHS  LLE EMG by Dr. Everett Quintanilla 1/8/21:  NCV & EMG Findings:  Evaluation of the left Sup Fibular sensory nerve showed no response (14 cm), no response (Site 2), and no response (Site 3). The left sural sensory nerve showed no response (Calf), no response (Site 2), and no response (Site 3). All remaining nerves (as indicated in the following tables) were within normal limits.       Needle evaluation of the left gluteus medius muscle showed moderately increased polyphasic potentials. The left anterior tibialis muscle showed increased motor unit amplitude and slightly increased polyphasic potentials. The left posterior tibialis muscle showed slightly increased spontaneous activity, increased motor unit amplitude, and slightly increased polyphasic potentials. All remaining muscles (as indicated in the following table) showed no evidence of electrical instability.       INTERPRETATION  This is an abnormal electrodiagnostic examination. These findings may be consistent with:   1. Sensory polyneuropathy - this is based on undetectable sensory responses.     2. Chronic left L5 radiculopathy - this is based on signs of chronicity throughout the L5 myotome.        Lumbar MRI images taken on 8/27/2020 personally reviewed with patient:  Alignment: Intact lordosis  Vertebral body height: Normal  Marrow signal: Unremarkable  Disc spaces: Preserved height and signal intensity  Conus: Terminates at T12-L1     Axial imaging correlation:     T11-12: There is mild signal loss reflecting some early intradiscal degenerative  change     L1-2: Patent canal and foramina.     L2-3: Patent canal and foramina.     L3-4: Signal loss is identified within the disc space. Disc space is not  narrowed. Central canal neuroforamina bilaterally patent. L4-5: Mild signal loss is identified there is a small central protrusion-type  Herniation. There is some increased signal in the synovium of the left L4-5 disc space not seen on adjacent levels consistent with some active inflammation of the synovial lining here may be the most significant pain generator on this exam.     Left and right neuroforamina are spared.     L5-S1: moderate signal loss mild narrowing of the disks present     Incidental anterior herniation and osteophytic formation also identified     Endplates demonstrate decreased signal T1 and increased on T2 consistent with Modic endplate changes reflecting active inflammatory change     The central canal is intact     The left neuroforamina demonstrates herniation osteophyte extending into the  neuroforamina. In addition there are some mild facet arthropathy. These changes appear to be compressing the exiting L5 root on the more lateral sagittal cuts. See image 4 series 3. These changes do not extend into the central canal to cause mass effect on the exiting left S1 nerve root     Right neuroforamina demonstrates similar osteophyte herniation call, defect  however now smaller. Defect does touch inferior surface of the exiting right L5  nerve root.  Nerve does not appear compressed or edematous.      Other structures: Unremarkable.        IMPRESSION  IMPRESSION:     L5-S1 demonstrates degenerative changes and left larger than right herniations  into the neuroforamina compression of the left L5 nerve root  Active inflammation around the L5-S1 disc space  Active inflammation around the left L4-5 facet synovial lining    Lumbar x-ray images taken on 4/30/2020 personally reviewed with patient:  FINDINGS: 5 views of the lumbar spine obtained. Vertebral body heights  preserved. Disc space loss at L5-S1. Lower lumbar endplate spurring. No  significant listhesis. Lumbar lordosis maintained. No discrete pars defects  identified. Lower lumbar mild facet arthropathy. No acute fracture identified.     IMPRESSION  IMPRESSION:     No clearly acute findings. Notable lower lumbar degenerative changes. 10 minutes of face-to-face contact were spent with the patient during today's visit extensively discussing symptoms and treatment plan. All questions were answered. More than half of this visit today was spent on counseling.      Written by Tim Merino as dictated by Eric Mackenzie MD

## 2021-10-07 ENCOUNTER — OFFICE VISIT (OUTPATIENT)
Dept: ORTHOPEDIC SURGERY | Age: 56
End: 2021-10-07
Payer: MEDICARE

## 2021-10-07 VITALS
HEART RATE: 98 BPM | TEMPERATURE: 97.1 F | WEIGHT: 280.2 LBS | BODY MASS INDEX: 37.95 KG/M2 | RESPIRATION RATE: 20 BRPM | OXYGEN SATURATION: 96 % | HEIGHT: 72 IN

## 2021-10-07 DIAGNOSIS — M54.16 LUMBAR RADICULOPATHY: Primary | ICD-10-CM

## 2021-10-07 PROCEDURE — 99213 OFFICE O/P EST LOW 20 MIN: CPT | Performed by: PHYSICAL MEDICINE & REHABILITATION

## 2021-10-07 PROCEDURE — G8417 CALC BMI ABV UP PARAM F/U: HCPCS | Performed by: PHYSICAL MEDICINE & REHABILITATION

## 2021-10-07 PROCEDURE — 3017F COLORECTAL CA SCREEN DOC REV: CPT | Performed by: PHYSICAL MEDICINE & REHABILITATION

## 2021-10-07 PROCEDURE — G8427 DOCREV CUR MEDS BY ELIG CLIN: HCPCS | Performed by: PHYSICAL MEDICINE & REHABILITATION

## 2021-10-07 PROCEDURE — G8432 DEP SCR NOT DOC, RNG: HCPCS | Performed by: PHYSICAL MEDICINE & REHABILITATION

## 2021-10-07 RX ORDER — AMITRIPTYLINE HYDROCHLORIDE 75 MG/1
75 TABLET, FILM COATED ORAL
Qty: 30 TABLET | Refills: 5 | Status: SHIPPED | OUTPATIENT
Start: 2021-10-07 | End: 2021-11-08 | Stop reason: ALTCHOICE

## 2021-10-07 RX ORDER — GABAPENTIN 600 MG/1
1200 TABLET ORAL 3 TIMES DAILY
Qty: 180 TABLET | Refills: 5 | Status: SHIPPED | OUTPATIENT
Start: 2021-10-07 | End: 2022-05-03 | Stop reason: SDUPTHER

## 2021-10-07 NOTE — H&P (VIEW-ONLY)
[address], [address]  [place], [address] [place] [address] Street  Phone: [phone]  Fax: [phone]        Francisco Domingo  : 1965  PCP: Larry Masterson MD  10/7/2021    PROGRESS NOTE      HISTORY OF PRESENT ILLNESS  Laxmi Gan is a 64 y.o. male who was seen as a new patient 2020 with c/o hx of stroke(speech deficit) c/o left low leg and low back pain x 4 weeks. He had been doing yard work planting bulbs for a few days prior to his symptoms. Since onset, it worsened despite having gone to the ED twice. None of the medications helped including medrol, muscle relaxers, NSAIDS, tylenol. He had constant symptoms that were provoked mainly by standing and walking. Sitting and laying were positions of comfort. The symptoms ran from the buttock to the side of the leg and top of the foot. His strength was intact, and he had no sensory deficit at the time. He continued to have low back pain radiating into the LLE into the top of the foot. He did not find improvement from a Prednisone dose pack. He did not see much benefit from Gabapentin 900 mg TID. Pt returned with increased symptoms of low back pain radiating into the LLE. He found some benefit initially from Lyrica 225 mg BID but noted that it did not last very long. Pt has been intolerant to [address] secondary to vomiting, blurry vision. [facility] with some benefit. Lumbar spine MRI dated 2020 reviewed. Per report, L5-S1 demonstrates degenerative changes and left larger than right herniations into the neuroforamina compression of the left L5 nerve root. Active inflammation around the L5-S1 disc space. Active inflammation around the left L4-5 facet synovial lining. He underwent a left L5 TFESI (2020; Dr. Dias Grams some relief of his LLE pain. He continues to have some residual LLE symptoms, especially in the left buttock, but overall, it has improved.  He continued to have LLE paraesthesia to the calf. He found some benefit with Prednisone. He found benefit from a second left L5 TFESI (12/15/2020; Dr. Kathy Vanegas) for about 5 days. He also found improvement from Amitriptyline 75 mg QHS. A LLE EMG dated 1/8/21 was suggestive of:  1. Sensory polyneuropathy - this is based on undetectable sensory responses. 2. Chronic left L5 radiculopathy - this is based on signs of chronicity throughout the L5 myotome. He saw Dr. Alison Oneil and opted to not proceed with surgery. He needs a refill of his Gabapentin- he has been tolerant to taking 1200 mg BID. He stopped Amitriptyline as he states it was ineffective. Mariana Lagos comes in to the office today for f/u. Pt notes that he ran out of Amitriptyline, but he has found it beneficial. He stopped Gabapentin as he thought it was causing headaches. Pain Score: 6/10. Treatments patient has tried:  Physical Therapy - yes 2020  Medications - GABAPENTIN (headaches), Prednisone, LYRICA, MDP, muscle relaxants, NSAIDs  Injections - Left L5 TFESI (9/22/2020) with benefit; left L5 (12/15/20)     PmHx: stroke (speech deficits)    ASSESSMENT  Mariana Lagos is a 64year-old male with low back pain radiating into the LLE. His symptoms are likely due to a left L5/S1 radiculopathy. He had a positive SLR, weakness with ankle PF, and decreased sensation in an S1 distribution on the L.     We discussed options of: left L5 TFESI      PLAN  1. Refill Amitriptyline 75 mg QHS. 2. Refill Gabapentin. 3. Left L5 TFESI      Pt will f/u in 2-4 weeks after injection or sooner as needed. Diagnoses and all orders for this visit:    1. Lumbar radiculopathy  -     amitriptyline (ELAVIL) 75 mg tablet; Take 1 Tablet by mouth nightly.  -     gabapentin (NEURONTIN) 600 mg tablet; Take 2 Tablets by mouth three (3) times daily.  Max Daily Amount: 3,600 mg.  -     SCHEDULE SURGERY         PAST MEDICAL HISTORY   Past Medical History:   Diagnosis Date    Anemia     Hypertension     Stroke [facility])        Past Surgical History:   Procedure Laterality Date    COLONOSCOPY     . MEDICATIONS    Current Outpatient Medications   Medication Sig Dispense Refill    metFORMIN ER (GLUCOPHAGE XR) 500 mg tablet Take 1,000 mg by mouth daily.  gabapentin (NEURONTIN) 600 mg tablet Take 2 Tablets by mouth three (3) times daily. Max Daily Amount: 3,600 mg. 180 Tablet 5    lisinopriL (PRINIVIL, ZESTRIL) 5 mg tablet TAKE ONE TABLET BY MOUTH DAILY      atorvastatin (LIPITOR) 80 mg tablet Take 80 mg by mouth daily.  amitriptyline (ELAVIL) 25 mg tablet Take 3 Tabs by mouth nightly. (Patient not taking: Reported on 10/7/2021) 90 Tab 2    rosuvastatin (CRESTOR) 40 mg tablet Take 1 Tab by mouth nightly. (Patient not taking: Reported on 10/7/2021)      traMADoL (ULTRAM) 50 mg tablet Take  mg by mouth every eight (8) hours as needed. (Patient not taking: Reported on 8/26/2021)      acetaminophen (Tylenol Extra Strength) 500 mg tablet Take 2 Tabs by mouth every six (6) hours as needed for Pain.  (Patient not taking: Reported on 8/26/2021) 20 Tab 0        ALLERGIES  Allergies   Allergen Reactions    Codeine Other (comments)     VOMITING          SOCIAL HISTORY    Social History     Socioeconomic History    Marital status: SINGLE     Spouse name: Not on file    Number of children: Not on file    Years of education: Not on file    Highest education level: Not on file   Tobacco Use    Smoking status: Current Every Day Smoker     Packs/day: 1.00     Years: 15.00     Pack years: 15.00    Smokeless tobacco: Current User   Substance and Sexual Activity    Alcohol use: Not Currently     Alcohol/week: 0.8 standard drinks     Types: 1 Cans of beer per week     Comment: states that he drinks 3 times per day    Drug use: No    Sexual activity: Not Currently   Social History Narrative    ** Merged History Encounter **          Social Determinants of Health     Financial Resource Strain:     Difficulty of Paying Living Expenses:    Food Insecurity:     Worried About Running Out of Food in the Last Year:     [address] in the Last Year:    Transportation Needs:     Lack of Transportation (Medical):  Lack of Transportation (Non-Medical):    Physical Activity:     Days of Exercise per Week:     Minutes of Exercise per Session:    Stress:     Feeling of Stress :    Social Connections:     Frequency of Communication with Friends and Family:     Frequency of Social Gatherings with Friends and Family:     Attends [employer] Services:     Active Member of Clubs or Organizations:     Attends Club or Organization Meetings:     Marital Status:        FAMILY HISTORY  Family History   Problem Relation Age of Onset    Hypertension Mother     High Cholesterol Mother     Elevated Lipids Mother     COPD Father     Heart Failure Brother     Diabetes Brother          REVIEW OF SYSTEMS  Review of Systems   Constitutional: Negative for chills, fever and weight loss. Respiratory: Negative for shortness of breath. Cardiovascular: Negative for chest pain. Gastrointestinal: Negative for constipation. Negative for fecal incontinence    Genitourinary: Negative for dysuria. Negative for urinary incontinence   Musculoskeletal: Positive for back pain. Skin: Negative for rash. Neurological: Positive for tingling ( LLE). Negative for dizziness, tremors, focal weakness and headaches. Endo/Heme/Allergies: Does not bruise/bleed easily. Psychiatric/Behavioral: The patient does not have insomnia.            PHYSICAL EXAMINATION  Visit Vitals  Pulse 98   Temp 97.1 °F (36.2 °C) (Temporal)   Resp 20   Ht 6' (1.829 m)   Wt 280 lb 3.2 oz (127.1 kg)   SpO2 96%   BMI 38.00 kg/m²       Pain Assessment  10/7/2021   Location of Pain Back;Leg;Buttocks   Location Modifiers Left   Severity of Pain 6   Quality of Pain Other (Comment)   Quality of Pain Comment it just hurts Duration of Pain Persistent   Frequency of Pain Constant   Aggravating Factors Walking   Aggravating Factors Comment -   Limiting Behavior Some   Relieving Factors Rest   Relieving Factors Comment -   Result of Injury -           Constitutional:  Well developed, well nourished, in no acute distress. Psychiatric: Affect and mood are appropriate. Integumentary: No rashes or abrasions noted on exposed areas. SPINE/MUSCULOSKELETAL EXAM    Lumbar spine:  No rash, ecchymosis, or gross obliquity. No fasciculations. No focal atrophy is noted. No pain with hip ROM. Full range of motion. No tenderness to palpation. No tenderness to palpation at the sciatic notch. SI joints non-tender.    Trochanters non tender.     Decreased sensation on the L in an S1 distribution.     Positive Straight Leg Raise on the Left.     MOTOR:      Biceps  Triceps Deltoids Wrist Ext Wrist Flex Hand Intrin   Right 5/5 5/5 5/5 5/5 5/5 5/5   Left 5/5 5/5 5/5 5/5 5/5 5/5             Hip Flex  Quads Hamstrings Ankle DF EHL Ankle PF   Right 5/5 5/5 5/5 5/5 5/5 5/5   Left 5/5 5/5 5/5 5/5 5/5 5/5     RADIOGRAPHS  LLE EMG by Dr. Filippo Mancia 1/8/21:  NCV & EMG Findings:  Evaluation of the left Sup Fibular sensory nerve showed no response (14 cm), no response (Site 2), and no response (Site 3).  The left sural sensory nerve showed no response (Calf), no response (Site 2), and no response (Site 3).  All remaining nerves (as indicated in the following tables) were within normal limits.       Needle evaluation of the left gluteus medius muscle showed moderately increased polyphasic potentials.  The left anterior tibialis muscle showed increased motor unit amplitude and slightly increased polyphasic potentials.  The left posterior tibialis muscle showed slightly increased spontaneous activity, increased motor unit amplitude, and slightly increased polyphasic potentials.  All remaining muscles (as indicated in the following table) showed no evidence of electrical instability.       INTERPRETATION  This is an abnormal electrodiagnostic examination. These findings may be consistent with:   1. Sensory polyneuropathy - this is based on undetectable sensory responses.    2. Chronic left L5 radiculopathy - this is based on signs of chronicity throughout the L5 myotome.         Lumbar MRI images taken on 8/27/2020 personally reviewed with patient:  Alignment: Intact lordosis  Vertebral body height: Normal  Marrow signal: Unremarkable  Disc spaces: Preserved height and signal intensity  Conus: Terminates at T12-L1     Axial imaging correlation:     T11-12: There is mild signal loss reflecting some early intradiscal degenerative  change     L1-2: Patent canal and foramina.     L2-3: Patent canal and foramina.     L3-4: Signal loss is identified within the disc space. Disc space is not  narrowed. Central canal neuroforamina bilaterally patent. L4-5: Mild signal loss is identified there is a small central protrusion-type  Herniation. There is some increased signal in the synovium of the left L4-5 disc space not seen on adjacent levels consistent with some active inflammation of the synovial lining here may be the most significant pain generator on this exam.     Left and right neuroforamina are spared.     L5-S1: moderate signal loss mild narrowing of the disks present     Incidental anterior herniation and osteophytic formation also identified     Endplates demonstrate decreased signal T1 and increased on T2 consistent with Modic endplate changes reflecting active inflammatory change     The central canal is intact     The left neuroforamina demonstrates herniation osteophyte extending into the  neuroforamina. In addition there are some mild facet arthropathy. These changes appear to be compressing the exiting L5 root on the more lateral sagittal cuts. See image 4 series 3.  These changes do not extend into the central canal to cause mass effect on the exiting left S1 nerve root     Right neuroforamina demonstrates similar osteophyte herniation call, defect  however now smaller. Defect does touch inferior surface of the exiting right L5  nerve root. Nerve does not appear compressed or edematous.      Other structures: Unremarkable.        IMPRESSION  IMPRESSION:     L5-S1 demonstrates degenerative changes and left larger than right herniations  into the neuroforamina compression of the left L5 nerve root  Active inflammation around the L5-S1 disc space  Active inflammation around the left L4-5 facet synovial lining    Lumbar x-ray images taken on 4/30/2020 personally reviewed with patient:  FINDINGS: 5 views of the lumbar spine obtained. Vertebral body heights  preserved. Disc space loss at L5-S1. Lower lumbar endplate spurring. No  significant listhesis. Lumbar lordosis maintained. No discrete pars defects  identified. Lower lumbar mild facet arthropathy. No acute fracture identified.     IMPRESSION  IMPRESSION:     No clearly acute findings. Notable lower lumbar degenerative changes. 15 minutes of face-to-face contact were spent with the patient during today's visit extensively discussing symptoms and treatment plan. All questions were answered. More than half of this visit today was spent on counseling.      Written by Ihsan Greene as dictated by Bart Toribio MD

## 2021-10-07 NOTE — PROGRESS NOTES
[address]  [address], [address]  [place], [address] [place] [address] Street  Phone: [phone]  Fax: [phone]        Tate Tahir  : 1965  PCP: Alyssa Roberts MD  10/7/2021    PROGRESS NOTE      HISTORY OF PRESENT ILLNESS  Lainey Sullivan is a 64 y.o. male who was seen as a new patient 2020 with c/o hx of stroke(speech deficit) c/o left low leg and low back pain x 4 weeks. He had been doing yard work planting bulbs for a few days prior to his symptoms. Since onset, it worsened despite having gone to the ED twice. None of the medications helped including medrol, muscle relaxers, NSAIDS, tylenol. He had constant symptoms that were provoked mainly by standing and walking. Sitting and laying were positions of comfort. The symptoms ran from the buttock to the side of the leg and top of the foot. His strength was intact, and he had no sensory deficit at the time. He continued to have low back pain radiating into the LLE into the top of the foot. He did not find improvement from a Prednisone dose pack. He did not see much benefit from Gabapentin 900 mg TID. Pt returned with increased symptoms of low back pain radiating into the LLE. He found some benefit initially from Lyrica 225 mg BID but noted that it did not last very long. Pt has been intolerant to [address] secondary to vomiting, blurry vision. He [facility] (-[facility] with some benefit. Lumbar spine MRI dated 2020 reviewed. Per report, L5-S1 demonstrates degenerative changes and left larger than right herniations into the neuroforamina compression of the left L5 nerve root. Active inflammation around the L5-S1 disc space. Active inflammation around the left L4-5 facet synovial lining. He underwent a left L5 TFESI (2020; Dr. Bulmaro Garcia some relief of his LLE pain. He continues to have some residual LLE symptoms, especially in the left buttock, but overall, it has improved.  He continued to have LLE paraesthesia to the calf. He found some benefit with Prednisone. He found benefit from a second left L5 TFESI (12/15/2020; Dr. Audra Zepeda) for about 5 days. He also found improvement from Amitriptyline 75 mg QHS. A LLE EMG dated 1/8/21 was suggestive of:  1. Sensory polyneuropathy - this is based on undetectable sensory responses. 2. Chronic left L5 radiculopathy - this is based on signs of chronicity throughout the L5 myotome. He saw Dr. Gaib Mancera and opted to not proceed with surgery. He needs a refill of his Gabapentin- he has been tolerant to taking 1200 mg BID. He stopped Amitriptyline as he states it was ineffective. Vini Maldonado comes in to the office today for f/u. Pt notes that he ran out of Amitriptyline, but he has found it beneficial. He stopped Gabapentin as he thought it was causing headaches. Pain Score: 6/10. Treatments patient has tried:  Physical Therapy - yes 2020  Medications - GABAPENTIN (headaches), Prednisone, LYRICA, MDP, muscle relaxants, NSAIDs  Injections - Left L5 TFESI (9/22/2020) with benefit; left L5 (12/15/20)     PmHx: stroke (speech deficits)    ASSESSMENT  Vini Maldonado is a 64year-old male with low back pain radiating into the LLE. His symptoms are likely due to a left L5/S1 radiculopathy. He had a positive SLR, weakness with ankle PF, and decreased sensation in an S1 distribution on the L.     We discussed options of: left L5 TFESI      PLAN  1. Refill Amitriptyline 75 mg QHS. 2. Refill Gabapentin. 3. Left L5 TFESI      Pt will f/u in 2-4 weeks after injection or sooner as needed. Diagnoses and all orders for this visit:    1. Lumbar radiculopathy  -     amitriptyline (ELAVIL) 75 mg tablet; Take 1 Tablet by mouth nightly.  -     gabapentin (NEURONTIN) 600 mg tablet; Take 2 Tablets by mouth three (3) times daily.  Max Daily Amount: 3,600 mg.  -     SCHEDULE SURGERY         PAST MEDICAL HISTORY   Past Medical History:   Diagnosis Date    Anemia     Hypertension     Stroke [facility])        Past Surgical History:   Procedure Laterality Date    COLONOSCOPY     . MEDICATIONS    Current Outpatient Medications   Medication Sig Dispense Refill    metFORMIN ER (GLUCOPHAGE XR) 500 mg tablet Take 1,000 mg by mouth daily.  gabapentin (NEURONTIN) 600 mg tablet Take 2 Tablets by mouth three (3) times daily. Max Daily Amount: 3,600 mg. 180 Tablet 5    lisinopriL (PRINIVIL, ZESTRIL) 5 mg tablet TAKE ONE TABLET BY MOUTH DAILY      atorvastatin (LIPITOR) 80 mg tablet Take 80 mg by mouth daily.  amitriptyline (ELAVIL) 25 mg tablet Take 3 Tabs by mouth nightly. (Patient not taking: Reported on 10/7/2021) 90 Tab 2    rosuvastatin (CRESTOR) 40 mg tablet Take 1 Tab by mouth nightly. (Patient not taking: Reported on 10/7/2021)      traMADoL (ULTRAM) 50 mg tablet Take  mg by mouth every eight (8) hours as needed. (Patient not taking: Reported on 8/26/2021)      acetaminophen (Tylenol Extra Strength) 500 mg tablet Take 2 Tabs by mouth every six (6) hours as needed for Pain.  (Patient not taking: Reported on 8/26/2021) 20 Tab 0        ALLERGIES  Allergies   Allergen Reactions    Codeine Other (comments)     VOMITING          SOCIAL HISTORY    Social History     Socioeconomic History    Marital status: SINGLE     Spouse name: Not on file    Number of children: Not on file    Years of education: Not on file    Highest education level: Not on file   Tobacco Use    Smoking status: Current Every Day Smoker     Packs/day: 1.00     Years: 15.00     Pack years: 15.00    Smokeless tobacco: Current User   Substance and Sexual Activity    Alcohol use: Not Currently     Alcohol/week: 0.8 standard drinks     Types: 1 Cans of beer per week     Comment: states that he drinks 3 times per day    Drug use: No    Sexual activity: Not Currently   Social History Narrative    ** Merged History Encounter **          Social Determinants of Health     Financial Resource Strain:     Difficulty of Paying Living Expenses:    Food Insecurity:     Worried About Running Out of Food in the Last Year:     [address] in the Last Year:    Transportation Needs:     Lack of Transportation (Medical):  Lack of Transportation (Non-Medical):    Physical Activity:     Days of Exercise per Week:     Minutes of Exercise per Session:    Stress:     Feeling of Stress :    Social Connections:     Frequency of Communication with Friends and Family:     Frequency of Social Gatherings with Friends and Family:     Attends [employer] Services:     Active Member of Clubs or Organizations:     Attends Club or Organization Meetings:     Marital Status:        FAMILY HISTORY  Family History   Problem Relation Age of Onset    Hypertension Mother     High Cholesterol Mother     Elevated Lipids Mother     COPD Father     Heart Failure Brother     Diabetes Brother          REVIEW OF SYSTEMS  Review of Systems   Constitutional: Negative for chills, fever and weight loss. Respiratory: Negative for shortness of breath. Cardiovascular: Negative for chest pain. Gastrointestinal: Negative for constipation. Negative for fecal incontinence    Genitourinary: Negative for dysuria. Negative for urinary incontinence   Musculoskeletal: Positive for back pain. Skin: Negative for rash. Neurological: Positive for tingling ( LLE). Negative for dizziness, tremors, focal weakness and headaches. Endo/Heme/Allergies: Does not bruise/bleed easily. Psychiatric/Behavioral: The patient does not have insomnia.            PHYSICAL EXAMINATION  Visit Vitals  Pulse 98   Temp 97.1 °F (36.2 °C) (Temporal)   Resp 20   Ht 6' (1.829 m)   Wt 280 lb 3.2 oz (127.1 kg)   SpO2 96%   BMI 38.00 kg/m²       Pain Assessment  10/7/2021   Location of Pain Back;Leg;Buttocks   Location Modifiers Left   Severity of Pain 6   Quality of Pain Other (Comment)   Quality of Pain Comment it just hurts Duration of Pain Persistent   Frequency of Pain Constant   Aggravating Factors Walking   Aggravating Factors Comment -   Limiting Behavior Some   Relieving Factors Rest   Relieving Factors Comment -   Result of Injury -           Constitutional:  Well developed, well nourished, in no acute distress. Psychiatric: Affect and mood are appropriate. Integumentary: No rashes or abrasions noted on exposed areas. SPINE/MUSCULOSKELETAL EXAM    Lumbar spine:  No rash, ecchymosis, or gross obliquity. No fasciculations. No focal atrophy is noted. No pain with hip ROM. Full range of motion. No tenderness to palpation. No tenderness to palpation at the sciatic notch. SI joints non-tender.    Trochanters non tender.     Decreased sensation on the L in an S1 distribution.     Positive Straight Leg Raise on the Left.     MOTOR:      Biceps  Triceps Deltoids Wrist Ext Wrist Flex Hand Intrin   Right 5/5 5/5 5/5 5/5 5/5 5/5   Left 5/5 5/5 5/5 5/5 5/5 5/5             Hip Flex  Quads Hamstrings Ankle DF EHL Ankle PF   Right 5/5 5/5 5/5 5/5 5/5 5/5   Left 5/5 5/5 5/5 5/5 5/5 5/5     RADIOGRAPHS  LLE EMG by Dr. Dionisio Vu 1/8/21:  NCV & EMG Findings:  Evaluation of the left Sup Fibular sensory nerve showed no response (14 cm), no response (Site 2), and no response (Site 3).  The left sural sensory nerve showed no response (Calf), no response (Site 2), and no response (Site 3).  All remaining nerves (as indicated in the following tables) were within normal limits.       Needle evaluation of the left gluteus medius muscle showed moderately increased polyphasic potentials.  The left anterior tibialis muscle showed increased motor unit amplitude and slightly increased polyphasic potentials.  The left posterior tibialis muscle showed slightly increased spontaneous activity, increased motor unit amplitude, and slightly increased polyphasic potentials.  All remaining muscles (as indicated in the following table) showed no evidence of electrical instability.       INTERPRETATION  This is an abnormal electrodiagnostic examination. These findings may be consistent with:   1. Sensory polyneuropathy - this is based on undetectable sensory responses.    2. Chronic left L5 radiculopathy - this is based on signs of chronicity throughout the L5 myotome.         Lumbar MRI images taken on 8/27/2020 personally reviewed with patient:  Alignment: Intact lordosis  Vertebral body height: Normal  Marrow signal: Unremarkable  Disc spaces: Preserved height and signal intensity  Conus: Terminates at T12-L1     Axial imaging correlation:     T11-12: There is mild signal loss reflecting some early intradiscal degenerative  change     L1-2: Patent canal and foramina.     L2-3: Patent canal and foramina.     L3-4: Signal loss is identified within the disc space. Disc space is not  narrowed. Central canal neuroforamina bilaterally patent. L4-5: Mild signal loss is identified there is a small central protrusion-type  Herniation. There is some increased signal in the synovium of the left L4-5 disc space not seen on adjacent levels consistent with some active inflammation of the synovial lining here may be the most significant pain generator on this exam.     Left and right neuroforamina are spared.     L5-S1: moderate signal loss mild narrowing of the disks present     Incidental anterior herniation and osteophytic formation also identified     Endplates demonstrate decreased signal T1 and increased on T2 consistent with Modic endplate changes reflecting active inflammatory change     The central canal is intact     The left neuroforamina demonstrates herniation osteophyte extending into the  neuroforamina. In addition there are some mild facet arthropathy. These changes appear to be compressing the exiting L5 root on the more lateral sagittal cuts. See image 4 series 3.  These changes do not extend into the central canal to cause mass effect on the exiting left S1 nerve root     Right neuroforamina demonstrates similar osteophyte herniation call, defect  however now smaller. Defect does touch inferior surface of the exiting right L5  nerve root. Nerve does not appear compressed or edematous.      Other structures: Unremarkable.        IMPRESSION  IMPRESSION:     L5-S1 demonstrates degenerative changes and left larger than right herniations  into the neuroforamina compression of the left L5 nerve root  Active inflammation around the L5-S1 disc space  Active inflammation around the left L4-5 facet synovial lining    Lumbar x-ray images taken on 4/30/2020 personally reviewed with patient:  FINDINGS: 5 views of the lumbar spine obtained. Vertebral body heights  preserved. Disc space loss at L5-S1. Lower lumbar endplate spurring. No  significant listhesis. Lumbar lordosis maintained. No discrete pars defects  identified. Lower lumbar mild facet arthropathy. No acute fracture identified.     IMPRESSION  IMPRESSION:     No clearly acute findings. Notable lower lumbar degenerative changes. 15 minutes of face-to-face contact were spent with the patient during today's visit extensively discussing symptoms and treatment plan. All questions were answered. More than half of this visit today was spent on counseling.      Written by Ruben Segura as dictated by Raul Wright MD

## 2021-10-12 ENCOUNTER — HOSPITAL ENCOUNTER (OUTPATIENT)
Age: 56
Setting detail: OUTPATIENT SURGERY
Discharge: HOME | End: 2021-10-12
Attending: PHYSICAL MEDICINE & REHABILITATION | Admitting: PHYSICAL MEDICINE & REHABILITATION
Payer: MEDICARE

## 2021-10-12 ENCOUNTER — APPOINTMENT (OUTPATIENT)
Dept: GENERAL RADIOLOGY | Age: 56
End: 2021-10-12
Attending: PHYSICAL MEDICINE & REHABILITATION
Payer: MEDICARE

## 2021-10-12 VITALS
DIASTOLIC BLOOD PRESSURE: 103 MMHG | RESPIRATION RATE: 16 BRPM | HEART RATE: 102 BPM | SYSTOLIC BLOOD PRESSURE: 150 MMHG | OXYGEN SATURATION: 94 % | TEMPERATURE: 97.9 F

## 2021-10-12 LAB — GLUCOSE BLD STRIP.AUTO-MCNC: 127 MG/DL (ref 70–110)

## 2021-10-12 PROCEDURE — 76010000009 HC PAIN MGT 0 TO 30 MIN PROC: Performed by: PHYSICAL MEDICINE & REHABILITATION

## 2021-10-12 PROCEDURE — 74011000636 HC RX REV CODE- 636: Performed by: PHYSICAL MEDICINE & REHABILITATION

## 2021-10-12 PROCEDURE — 82962 GLUCOSE BLOOD TEST: CPT

## 2021-10-12 PROCEDURE — 77030003672 HC NDL SPN HALY -A: Performed by: PHYSICAL MEDICINE & REHABILITATION

## 2021-10-12 PROCEDURE — 74011250637 HC RX REV CODE- 250/637: Performed by: PHYSICAL MEDICINE & REHABILITATION

## 2021-10-12 PROCEDURE — 64483 NJX AA&/STRD TFRM EPI L/S 1: CPT | Performed by: PHYSICAL MEDICINE & REHABILITATION

## 2021-10-12 PROCEDURE — 2709999900 HC NON-CHARGEABLE SUPPLY: Performed by: PHYSICAL MEDICINE & REHABILITATION

## 2021-10-12 PROCEDURE — 77030003669 HC NDL SPN COOK -B: Performed by: PHYSICAL MEDICINE & REHABILITATION

## 2021-10-12 PROCEDURE — 74011000250 HC RX REV CODE- 250: Performed by: PHYSICAL MEDICINE & REHABILITATION

## 2021-10-12 PROCEDURE — 77030039433 HC TY MYLEOGRAM BD -B: Performed by: PHYSICAL MEDICINE & REHABILITATION

## 2021-10-12 PROCEDURE — 74011250636 HC RX REV CODE- 250/636: Performed by: PHYSICAL MEDICINE & REHABILITATION

## 2021-10-12 RX ORDER — DIAZEPAM 5 MG/1
5-20 TABLET ORAL ONCE
Status: COMPLETED | OUTPATIENT
Start: 2021-10-12 | End: 2021-10-12

## 2021-10-12 RX ORDER — DEXAMETHASONE SODIUM PHOSPHATE 100 MG/10ML
INJECTION INTRAMUSCULAR; INTRAVENOUS AS NEEDED
Status: DISCONTINUED | OUTPATIENT
Start: 2021-10-12 | End: 2021-10-12 | Stop reason: HOSPADM

## 2021-10-12 RX ORDER — LIDOCAINE HYDROCHLORIDE 10 MG/ML
INJECTION, SOLUTION EPIDURAL; INFILTRATION; INTRACAUDAL; PERINEURAL AS NEEDED
Status: DISCONTINUED | OUTPATIENT
Start: 2021-10-12 | End: 2021-10-12 | Stop reason: HOSPADM

## 2021-10-12 RX ADMIN — DIAZEPAM 10 MG: 5 TABLET ORAL at 08:27

## 2021-10-12 NOTE — DISCHARGE INSTRUCTIONS
[facility]   ([place])  Dr. Luana Matos, Dr. Figueroa, Dr. Cristela Rosas Spinal Procedure (Block) Instructions    * Do not drive a car, operate heavy machinery or dangerous equipment, or make important decisions for 12-24 hours. * Light activity as tolerated; may rest for the remainder of the day. * Resume pre-block medications including those from your other doctors. * Do not drink alcoholic beverages for 24 hours. Alcohol and the medications you have received may interact and cause an adverse reaction. * You may feel better this evening and worse tomorrow, as the numbing medications wears off and the steroid has yet to begin to work. After 48-72 hrs the steroid should begin to release bringing you relief. If you had a medial branch block, no steroids were used. The medial branch block is a test to see if you are a candidate for radiofrequency ablation (RFA). The anesthetic (numbing medicine)  will wear off by the next day. * You may shower this evening and remove any bandages. * Avoid hot tubs/pools/tub soaks and heating pads for 24 hours. You may use cold packs on the procedure site as tolerated for the first 24 hours. * If a headache develops, drink plenty of fluids and rest.  Take over the counter medications for headache if needed. If the headache continues longer than 24 hours, call MD at the [address] [place] [address]. [phone]    * Continue taking pain medications as needed. * You may resume your regular diet if tolerated. Otherwise, start with sips of water and advance slowly. * If Diabetic: check your blood sugar three times a day for the next 3 days. If your sugar is greater than 300 call your family doctor. If your sugar is greater than 400, have someone transport you to the nearest Emergency Room. * If you experience any of the following problems, Please Call the [address] [place] [address] Avenue at [phone].         * Excessive pain, swelling, redness or odor at or around the surgical area    * Fever of 101 or higher    * Nausea / Vomiting lasting longer than 4 hours or if unable to take medications. * Severe Headache    * Weakness or numbness in arms or legs that is not      resolving   * Any NEW signs of decreased circulation or nerve impairment in leg: change in color, swelling, persistent numbness, tingling                    * Prolonged increase in pain greater than 4 days      PATIENT INSTRUCTIONS:    After oral sedation, for 12-24 hours or while taking prescription Narcotics:  · Limit your activities  · Do not drive and operate hazardous machinery  · Do not make important personal or business decisions  · Do  not drink alcoholic beverages  · If you have not urinated within 8 hours after discharge, please contact your surgeon on call. *  Please give a list of your current medications to your Primary Care Provider. *  Please update this list whenever your medications are discontinued, doses are      changed, or new medications (including over-the-counter products) are added. *  Please carry medication information at all times in case of emergency situations. These are general instructions for a healthy lifestyle:    No smoking/ No tobacco products/ Avoid exposure to second hand smoke    Surgeon General's Warning:  Quitting smoking now greatly reduces serious risk to your health. Obesity, smoking, and sedentary lifestyle greatly increases your risk for illness    A healthy diet, regular physical exercise & weight monitoring are important for maintaining a healthy lifestyle    You may be retaining fluid if you have a history of heart failure or if you experience any of the following symptoms:  Weight gain of 3 pounds or more overnight or 5 pounds in a week, increased swelling in our hands or feet or shortness of breath while lying flat in bed.   Please call your doctor as soon as you notice any of these symptoms; do not wait until your next office visit. Recognize signs and symptoms of STROKE:    F-face looks uneven    A-arms unable to move or move unevenly    S-speech slurred or non-existent    T-time-call 911 as soon as signs and symptoms begin-DO NOT go       Back to bed or wait to see if you get better-TIME IS BRAIN.

## 2021-10-12 NOTE — PROCEDURES
SELECTIVE NERVE ROOT BLOCK PROCEDURE NOTE      Patient Name: Josse Lucio  Date of Procedure: October 12, 2021  Preoperative Diagnosis:  Lumbar radiculopathy [M54.16]  Post Operative Diagnosis:  Lumbar radiculopathy [M54.16]  Location:  [place], [place]    Procedure :    left L5 Selective Nerve Root Block      Consent:  Informed consent was obtained prior to the procedure. The patient was given the opportunity to ask questions regarding the procedure and its associated risks. In addition to the potential risks associated with the procedure itself, the patient was informed both verbally and in writing of the potential side effects of the use of glucocorticoid. The patient appeared to comprehend the informed consent and desired to have the procedure performed. Procedure: The patient was placed in the prone position on the fluoroscopy table and the back was prepped and draped in the usual sterile manner. The exact spinal level was  identified using fluoroscopy, and Lidocaine 1 % was injected locally, a 22 gauge spinal needle was passed to the transverse process. The depth was noted and the needle redirected to pass inferior and approximately one cm anterior to the transverse process. YES  1 cc of Isovue M-200 was used to verify positioning in the epidural and paravertebral space and outlined the course of the spinal nerve into the epidural space. The same procedure was repeated at each spinal level indicated above. No vascular uptake was identified. A total of 10 mg of preservative free Dexamethasone and 1 cc of Lidocaine/site was slowly injected. The patient tolerated the procedure well. The injection area was cleaned and bandaids applied. Not excessive bleeding was noted. Patient dressed and discharged to home with instructions. Discussion: The patient tolerated the procedure well.  Patient reported frederic-procedural pain on Visual Analog Scale:  pre-7; post-7.                                               Barak Smith MD  October 12, 2021

## 2021-10-12 NOTE — INTERVAL H&P NOTE
Update History & Physical    The Patient's History and Physical of October 7, 2021 was reviewed. There was no change. The surgical site was confirmed by the patient and me. Plan:  The risk, benefits, expected outcome, and alternative to the recommended procedure have been discussed with the patient. Patient understands and wants to proceed with the procedure.     Electronically signed by Diana Jackson MD on 10/12/2021 at 8:56 AM

## 2021-11-08 ENCOUNTER — OFFICE VISIT (OUTPATIENT)
Dept: ORTHOPEDIC SURGERY | Age: 56
End: 2021-11-08
Payer: MEDICARE

## 2021-11-08 VITALS
HEART RATE: 96 BPM | OXYGEN SATURATION: 96 % | HEIGHT: 72 IN | WEIGHT: 284 LBS | TEMPERATURE: 98.3 F | BODY MASS INDEX: 38.47 KG/M2

## 2021-11-08 DIAGNOSIS — R20.0 NUMBNESS AND TINGLING OF LEFT LEG: ICD-10-CM

## 2021-11-08 DIAGNOSIS — M54.16 ACUTE LEFT LUMBAR RADICULOPATHY: ICD-10-CM

## 2021-11-08 DIAGNOSIS — M54.16 LUMBAR RADICULOPATHY: Primary | ICD-10-CM

## 2021-11-08 DIAGNOSIS — R20.2 NUMBNESS AND TINGLING OF LEFT LEG: ICD-10-CM

## 2021-11-08 PROCEDURE — G8417 CALC BMI ABV UP PARAM F/U: HCPCS | Performed by: NURSE PRACTITIONER

## 2021-11-08 PROCEDURE — G8427 DOCREV CUR MEDS BY ELIG CLIN: HCPCS | Performed by: NURSE PRACTITIONER

## 2021-11-08 PROCEDURE — G8432 DEP SCR NOT DOC, RNG: HCPCS | Performed by: NURSE PRACTITIONER

## 2021-11-08 PROCEDURE — 99204 OFFICE O/P NEW MOD 45 MIN: CPT | Performed by: NURSE PRACTITIONER

## 2021-11-08 PROCEDURE — 3017F COLORECTAL CA SCREEN DOC REV: CPT | Performed by: NURSE PRACTITIONER

## 2021-11-08 RX ORDER — DULOXETIN HYDROCHLORIDE 30 MG/1
30 CAPSULE, DELAYED RELEASE ORAL DAILY
Qty: 30 CAPSULE | Refills: 1 | Status: SHIPPED | OUTPATIENT
Start: 2021-11-08 | End: 2021-12-08

## 2021-11-08 NOTE — PROGRESS NOTES
Chief complaint   Chief Complaint   Patient presents with    Buttocks pain     left     Leg Pain     left       History of Present Illness:  Vini Maldonado is a  64 y.o.  male who has a history of back pain with radiating left buttock and leg pain down to his foot. He has a known disc herniation at L5-S1 on the left. He has seen Dr. Gabi Mancera for surgical opinion but did not want to have any surgery. He has done physical therapy in the past and is currently on gabapentin 600 mg 2 tabs 3 times a day and amitriptyline 75 mg at bedtime. His EMG did show a chronic L5 left radiculopathy. He underwent a left L5 selective nerve root block on October 12, 2021. This is at least his third block. He states it really did not help this time at all. He denies fever bowel bladder dysfunction. He is on Social Security disability as he has had previous strokes in the past.  He smokes 1 pack cigarettes a day +5 cigarettes. Physical Exam: The patient is a 55-year-old male well-developed well-nourished who is alert and oriented with a normal mood and affect. He has a full weightbearing slightly antalgic gait. He did not use any assist device. He has 4 out of 5 strength bilateral lower extremities. Negative straight leg raise. Assessment and Plan: This is a patient who has a disc herniation that gives her back pain and radiating left lower extremity pain. He will wean off the amitriptyline. I will start him on Cymbalta 30 mg daily once he is weaned off of it. He will continue his gabapentin. We will see him back in 6 weeks sooner if needed. Medications:  Current Outpatient Medications   Medication Sig Dispense Refill    DULoxetine (CYMBALTA) 30 mg capsule Take 1 Capsule by mouth daily. Indications: neuropathic pain 30 Capsule 1    gabapentin (NEURONTIN) 600 mg tablet Take 2 Tablets by mouth three (3) times daily.  Max Daily Amount: 3,600 mg. 180 Tablet 5    metFORMIN ER (GLUCOPHAGE XR) 500 mg tablet Take 1,000 mg by mouth daily.  lisinopriL (PRINIVIL, ZESTRIL) 5 mg tablet TAKE ONE TABLET BY MOUTH DAILY      atorvastatin (LIPITOR) 80 mg tablet Take 80 mg by mouth daily. Review of systems:    Past Medical History:   Diagnosis Date    Anemia     Hypertension     Stroke [facility])      Past Surgical History:   Procedure Laterality Date    COLONOSCOPY       Social History     Socioeconomic History    Marital status: SINGLE     Spouse name: Not on file    Number of children: Not on file    Years of education: Not on file    Highest education level: Not on file   Occupational History    Not on file   Tobacco Use    Smoking status: Current Every Day Smoker     Packs/day: 1.00     Years: 15.00     Pack years: 15.00    Smokeless tobacco: Current User   Substance and Sexual Activity    Alcohol use: Not Currently     Alcohol/week: 0.8 standard drinks     Types: 1 Cans of beer per week     Comment: states that he drinks 3 times per day    Drug use: No    Sexual activity: Not Currently   Other Topics Concern    Not on file   Social History Narrative    ** Merged History Encounter **          Social Determinants of Health     Financial Resource Strain:     Difficulty of Paying Living Expenses: Not on file   Food Insecurity:     Worried About Running Out of Food in the Last Year: Not on file    Noel of Food in the Last Year: Not on file   Transportation Needs:     Lack of Transportation (Medical): Not on file    Lack of Transportation (Non-Medical):  Not on file   Physical Activity:     Days of Exercise per Week: Not on file    Minutes of Exercise per Session: Not on file   Stress:     Feeling of Stress : Not on file   Social Connections:     Frequency of Communication with Friends and Family: Not on file    Frequency of Social Gatherings with Friends and Family: Not on file    Attends Yazidism Services: Not on file    Active Member of Clubs or Organizations: Not on file    Attends Club or Organization Meetings: Not on file    Marital Status: Not on file   Intimate Partner Violence:     Fear of Current or Ex-Partner: Not on file    Emotionally Abused: Not on file    Physically Abused: Not on file    Sexually Abused: Not on file   Housing Stability:     Unable to Pay for Housing in the Last Year: Not on file    Number of [place] in the Last Year: Not on file    Unstable Housing in the Last Year: Not on file     Family History   Problem Relation Age of Onset    Hypertension Mother     High Cholesterol Mother     Elevated Lipids Mother     COPD Father     Heart Failure Brother     Diabetes Brother        Physical Exam:  Visit Vitals  Pulse 96   Temp 98.3 °F (36.8 °C) (Skin)   Ht 6' (1.829 m)   Wt 284 lb (128.8 kg)   SpO2 96% Comment: RA   BMI 38.52 kg/m²     Pain Scale: 7/10       has been . reviewed and is appropriate          Diagnoses and all orders for this visit:    1. Lumbar radiculopathy  -     DULoxetine (CYMBALTA) 30 mg capsule; Take 1 Capsule by mouth daily. Indications: neuropathic pain    2. Numbness and tingling of left leg  -     DULoxetine (CYMBALTA) 30 mg capsule; Take 1 Capsule by mouth daily. Indications: neuropathic pain    3. Acute left lumbar radiculopathy            Follow-up and Dispositions    · Return in about 6 weeks (around 12/20/2021) for with Dr Wisam Lu.              We have informed Andrei Cardona to notify us for immediate appointment if he has any worsening neurogical symptoms or if an emergency situation presents, then call [phone]

## 2021-11-08 NOTE — PROGRESS NOTES
Warnell Oppenheim presents today for   Chief Complaint   Patient presents with    Buttocks pain     left     Leg Pain     left       Is someone accompanying this pt? no    Is the patient using any DME equipment during OV? no    Depression Screening:  3 most recent PHQ Screens 7/27/2020   Little interest or pleasure in doing things Not at all   Feeling down, depressed, irritable, or hopeless Not at all   Total Score PHQ 2 0       Learning Assessment:  Learning Assessment 7/27/2020   PRIMARY LEARNER Patient   HIGHEST LEVEL OF EDUCATION - PRIMARY LEARNER  GRADUATED HIGH SCHOOL OR GED   BARRIERS PRIMARY LEARNER VISUAL     COGNITIVE   CO-LEARNER CAREGIVER No   PRIMARY LANGUAGE ENGLISH   LEARNER PREFERENCE PRIMARY OTHER (COMMENT)   ANSWERED BY PATIENT   RELATIONSHIP SELF       Abuse Screening:  Abuse Screening Questionnaire 7/27/2020   Do you ever feel afraid of your partner? N   Are you in a relationship with someone who physically or mentally threatens you? N   Is it safe for you to go home? Y       Coordination of Care:  1. Have you been to the ER, urgent care clinic since your last visit? no  Hospitalized since your last visit? no    2. Have you seen or consulted any other health care providers outside of the [address] [place] [address] since your last visit? no Include any pap smears or colon screening.  no

## 2021-12-08 RX ORDER — ROSUVASTATIN CALCIUM 40 MG/1
40 TABLET, COATED ORAL
COMMUNITY

## 2021-12-14 ENCOUNTER — ANESTHESIA EVENT (OUTPATIENT)
Dept: ENDOSCOPY | Age: 56
End: 2021-12-14
Payer: MEDICARE

## 2021-12-15 ENCOUNTER — HOSPITAL ENCOUNTER (OUTPATIENT)
Age: 56
Setting detail: OUTPATIENT SURGERY
Discharge: HOME | End: 2021-12-15
Attending: INTERNAL MEDICINE | Admitting: INTERNAL MEDICINE
Payer: MEDICARE

## 2021-12-15 ENCOUNTER — ANESTHESIA (OUTPATIENT)
Dept: ENDOSCOPY | Age: 56
End: 2021-12-15
Payer: MEDICARE

## 2021-12-15 VITALS
HEIGHT: 72 IN | TEMPERATURE: 97.1 F | SYSTOLIC BLOOD PRESSURE: 139 MMHG | OXYGEN SATURATION: 98 % | RESPIRATION RATE: 16 BRPM | HEART RATE: 94 BPM | WEIGHT: 281.2 LBS | BODY MASS INDEX: 38.09 KG/M2 | DIASTOLIC BLOOD PRESSURE: 96 MMHG

## 2021-12-15 PROCEDURE — 00811 ANES LWR INTST NDSC NOS: CPT | Performed by: ANESTHESIOLOGY

## 2021-12-15 PROCEDURE — 77030008565 HC TBNG SUC IRR ERBE -B: Performed by: INTERNAL MEDICINE

## 2021-12-15 PROCEDURE — 77030021593 HC FCPS BIOP ENDOSC BSC -A: Performed by: INTERNAL MEDICINE

## 2021-12-15 PROCEDURE — 74011250637 HC RX REV CODE- 250/637: Performed by: NURSE ANESTHETIST, CERTIFIED REGISTERED

## 2021-12-15 PROCEDURE — 2709999900 HC NON-CHARGEABLE SUPPLY: Performed by: INTERNAL MEDICINE

## 2021-12-15 PROCEDURE — 74011000250 HC RX REV CODE- 250: Performed by: NURSE ANESTHETIST, CERTIFIED REGISTERED

## 2021-12-15 PROCEDURE — 76060000031 HC ANESTHESIA FIRST 0.5 HR: Performed by: INTERNAL MEDICINE

## 2021-12-15 PROCEDURE — 00811 ANES LWR INTST NDSC NOS: CPT | Performed by: NURSE ANESTHETIST, CERTIFIED REGISTERED

## 2021-12-15 PROCEDURE — 76040000019: Performed by: INTERNAL MEDICINE

## 2021-12-15 PROCEDURE — 77030013992 HC SNR POLYP ENDOSC BSC -B: Performed by: INTERNAL MEDICINE

## 2021-12-15 PROCEDURE — 88305 TISSUE EXAM BY PATHOLOGIST: CPT

## 2021-12-15 PROCEDURE — 74011250636 HC RX REV CODE- 250/636: Performed by: NURSE ANESTHETIST, CERTIFIED REGISTERED

## 2021-12-15 RX ORDER — FENTANYL CITRATE 50 UG/ML
25 INJECTION, SOLUTION INTRAMUSCULAR; INTRAVENOUS AS NEEDED
Status: CANCELLED | OUTPATIENT
Start: 2021-12-15

## 2021-12-15 RX ORDER — LIDOCAINE HYDROCHLORIDE 20 MG/ML
INJECTION, SOLUTION EPIDURAL; INFILTRATION; INTRACAUDAL; PERINEURAL AS NEEDED
Status: DISCONTINUED | OUTPATIENT
Start: 2021-12-15 | End: 2021-12-15 | Stop reason: HOSPADM

## 2021-12-15 RX ORDER — FAMOTIDINE 20 MG/1
20 TABLET, FILM COATED ORAL ONCE
Status: COMPLETED | OUTPATIENT
Start: 2021-12-15 | End: 2021-12-15

## 2021-12-15 RX ORDER — MIDAZOLAM HYDROCHLORIDE 1 MG/ML
INJECTION, SOLUTION INTRAMUSCULAR; INTRAVENOUS AS NEEDED
Status: DISCONTINUED | OUTPATIENT
Start: 2021-12-15 | End: 2021-12-15 | Stop reason: HOSPADM

## 2021-12-15 RX ORDER — SODIUM CHLORIDE 0.9 % (FLUSH) 0.9 %
5-40 SYRINGE (ML) INJECTION EVERY 8 HOURS
Status: CANCELLED | OUTPATIENT
Start: 2021-12-15

## 2021-12-15 RX ORDER — SODIUM CHLORIDE 0.9 % (FLUSH) 0.9 %
5-40 SYRINGE (ML) INJECTION AS NEEDED
Status: CANCELLED | OUTPATIENT
Start: 2021-12-15

## 2021-12-15 RX ORDER — LIDOCAINE HYDROCHLORIDE 10 MG/ML
0.1 INJECTION, SOLUTION EPIDURAL; INFILTRATION; INTRACAUDAL; PERINEURAL AS NEEDED
Status: DISCONTINUED | OUTPATIENT
Start: 2021-12-15 | End: 2021-12-15 | Stop reason: HOSPADM

## 2021-12-15 RX ORDER — PROPOFOL 10 MG/ML
INJECTION, EMULSION INTRAVENOUS AS NEEDED
Status: DISCONTINUED | OUTPATIENT
Start: 2021-12-15 | End: 2021-12-15 | Stop reason: HOSPADM

## 2021-12-15 RX ORDER — SODIUM CHLORIDE, SODIUM LACTATE, POTASSIUM CHLORIDE, CALCIUM CHLORIDE 600; 310; 30; 20 MG/100ML; MG/100ML; MG/100ML; MG/100ML
100 INJECTION, SOLUTION INTRAVENOUS CONTINUOUS
Status: CANCELLED | OUTPATIENT
Start: 2021-12-15

## 2021-12-15 RX ORDER — SODIUM CHLORIDE, SODIUM LACTATE, POTASSIUM CHLORIDE, CALCIUM CHLORIDE 600; 310; 30; 20 MG/100ML; MG/100ML; MG/100ML; MG/100ML
75 INJECTION, SOLUTION INTRAVENOUS CONTINUOUS
Status: DISCONTINUED | OUTPATIENT
Start: 2021-12-15 | End: 2021-12-15 | Stop reason: HOSPADM

## 2021-12-15 RX ADMIN — PROPOFOL 100 MG: 10 INJECTION, EMULSION INTRAVENOUS at 07:54

## 2021-12-15 RX ADMIN — PROPOFOL 50 MG: 10 INJECTION, EMULSION INTRAVENOUS at 07:55

## 2021-12-15 RX ADMIN — FAMOTIDINE 20 MG: 20 TABLET ORAL at 07:39

## 2021-12-15 RX ADMIN — PROPOFOL 50 MG: 10 INJECTION, EMULSION INTRAVENOUS at 07:59

## 2021-12-15 RX ADMIN — LIDOCAINE HYDROCHLORIDE 100 MG: 20 INJECTION, SOLUTION EPIDURAL; INFILTRATION; INTRACAUDAL; PERINEURAL at 07:54

## 2021-12-15 RX ADMIN — MIDAZOLAM HYDROCHLORIDE 2 MG: 2 INJECTION, SOLUTION INTRAMUSCULAR; INTRAVENOUS at 07:48

## 2021-12-15 RX ADMIN — PROPOFOL 50 MG: 10 INJECTION, EMULSION INTRAVENOUS at 07:57

## 2021-12-15 RX ADMIN — SODIUM CHLORIDE, SODIUM LACTATE, POTASSIUM CHLORIDE, AND CALCIUM CHLORIDE 75 ML/HR: 600; 310; 30; 20 INJECTION, SOLUTION INTRAVENOUS at 07:39

## 2021-12-15 NOTE — DISCHARGE INSTRUCTIONS
Colonoscopy: What to Expect at [address] [place] [address]  After you have a colonoscopy, you will stay at the clinic for 1 to 2 hours until the medicines wear off. Then you can go home. But you will need to arrange for a ride. Your doctor will tell you when you can eat and do your other usual activities. Your doctor will talk to you about when you will need your next colonoscopy. Your doctor can help you decide how often you need to be checked. This will depend on the results of your test and your risk for colorectal cancer. After the test, you may be bloated or have gas pains. You may need to pass gas. If a biopsy was done or a polyp was removed, you may have streaks of blood in your stool (feces) for a few days. This care sheet gives you a general idea about how long it will take for you to recover. But each person recovers at a different pace. Follow the steps below to get better as quickly as possible. How can you care for yourself at home? Activity  · Rest when you feel tired. · You can do your normal activities when it feels okay to do so. Diet  · Follow your doctor's directions for eating. · Unless your doctor has told you not to, drink plenty of fluids. This helps to replace the fluids that were lost during the colon prep. · Do not drink alcohol. Medicines  · If polyps were removed or a biopsy was done during the test, your doctor may tell you not to take aspirin or other anti-inflammatory medicines for a few days. These include ibuprofen (Advil, Motrin) and naproxen (Aleve). Other instructions  · For your safety, do not drive or operate machinery until the medicine wears off and you can think clearly. Your doctor may tell you not to drive or operate machinery until the day after your test.  · Do not sign legal documents or make major decisions until the medicine wears off and you can think clearly. The anesthesia can make it hard for you to fully understand what you are agreeing to.   Follow-up care is a key part of your treatment and safety. Be sure to make and go to all appointments, and call your doctor if you are having problems. It's also a good idea to know your test results and keep a list of the medicines you take. When should you call for help? Call 911 anytime you think you may need emergency care. For example, call if:  · You passed out (lost consciousness). · You pass maroon or bloody stools. · You have severe belly pain. Call your doctor now or seek immediate medical care if:  · Your stools are black and tarlike. · Your stools have streaks of blood, but you did not have a biopsy or any polyps removed. · You have belly pain, or your belly is swollen and firm. · You vomit. · You have a fever. · You are very dizzy. Watch closely for changes in your health, and be sure to contact your doctor if you have any problems. Where can you learn more? Go to [employer]ceed[employer].be  Enter E264 in the search box to learn more about \"Colonoscopy: What to Expect at Home. \"   © 0581-0087 Healthwise, Incorporated. Care instructions adapted under license by [employer] (which disclaims liability or warranty for this information). This care instruction is for use with your licensed healthcare professional. If you have questions about a medical condition or this instruction, always ask your healthcare professional. [place] [address] any warranty or liability for your use of this information. Content Version: 66.5.556240; Current as of: November 14, 2014      DISCHARGE SUMMARY from Nurse     POST-PROCEDURE INSTRUCTIONS:    Call your Physician if you:  ? Observe any excess bleeding. ? Develop a temperature over 100.5o F.  ? Experience abdominal, shoulder or chest pain. ? Notice any signs of decreased circulation or nerve impairment to an extremity such as a change in color, persistent numbness, tingling, coldness or increase in pain. ?  Vomit blood or you have nausea and vomiting lasting longer than 4 hours. ? Are unable to take medications. ? Are unable to urinate within 8 hours after discharge following general anesthesia or intravenous sedation. For the next 24 hours after receiving general anesthesia or intravenous sedation, or while taking prescription Narcotics, limit your activities:  ? Do NOT drive a motor vehicle, operate hazard machinery or power tools, or perform tasks that require coordination. The medication you received during your procedure may have some effect on your mental awareness. ? Do NOT make important personal or business decisions. The medication you received during your procedure may have some effect on your mental awareness. ? Do NOT drink alcoholic beverages. These drinks do not mix well with the medications that have been given to you. ? Upon discharge from the hospital, you must be accompanied by a responsible adult. ? Resume your diet as directed by your physician. ? Resume medications as your physician has prescribed. ? Please give a list of your current medications to your Primary Care Provider. ? Please update this list whenever your medications are discontinued, doses are changed, or new medications (including over-the-counter products) are added. ? Please carry medication information at all times in case of emergency situations. These are general instructions for a healthy lifestyle:    No smoking/ No tobacco products/ Avoid exposure to second hand smoke.  Surgeon General's Warning:  Quitting smoking now greatly reduces serious risk to your health. Obesity, smoking, and a sedentary lifestyle greatly increase your risk for illness.    A healthy diet, regular physical exercise & weight monitoring are important for maintaining a healthy lifestyle   You may be retaining fluid if you have a history of heart failure or if you experience any of the following symptoms:  Weight gain of 3 pounds or more overnight or 5 pounds in a week, increased swelling in our hands or feet or shortness of breath while lying flat in bed. Please call your doctor as soon as you notice any of these symptoms; do not wait until your next office visit. Colorectal Screening   Colorectal cancer almost always develops from precancerous polyps (abnormal growths) in the colon or rectum. Screening tests can find precancerous polyps, so that they can be removed before they turn into cancer. Screening tests can also find colorectal cancer early, when treatment works best.  [facility] Speak with your physician about when you should begin screening and how often you should be tested. Additional Information    Educational references and/or instructions provided during this visit included:    See attached. APPOINTMENTS:    Per MD Instruction. Discharge information has been reviewed with the patient. The patient verbalized understanding. Patient Education   Patient Education        High-Fiber Diet: Care Instructions  Overview     A high-fiber diet may help you relieve constipation and feel less bloated. Your doctor and dietitian will help you make a high-fiber eating plan based on your personal needs. The plan will include the things you like to eat. It will also make sure that you get 25 to 35 grams of fiber a day. Before you make changes to the way you eat, be sure to talk with your doctor or dietitian. Follow-up care is a key part of your treatment and safety. Be sure to make and go to all appointments, and call your doctor if you are having problems. It's also a good idea to know your test results and keep a list of the medicines you take. How can you care for yourself at home? · You can increase how much fiber you get if you eat more of certain foods. These foods include:  ? Whole-grain breads and cereals. ? Fruits, such as pears, apples, and peaches.  Eat the skins and peels if you can.  ? Vegetables, such as broccoli, cabbage, spinach, carrots, asparagus, and squash. ? Starchy vegetables. These include potatoes with skins, kidney beans, and lima beans. · Take a fiber supplement every day if your doctor recommends it. Examples are Benefiber, Citrucel, FiberCon, and Metamucil. Ask your doctor how much to take. · Drink plenty of fluids. If you have kidney, heart, or liver disease and have to limit fluids, talk with your doctor before you increase the amount of fluids you drink. Where can you learn more? Go to http://www.gray.com/  Enter K442 in the search box to learn more about \"High-Fiber Diet: Care Instructions. \"  Current as of: December 17, 2020               Content Version: 13.0  © 2006-2021 [employer]. Care instructions adapted under license by [employer] (which disclaims liability or warranty for this information). If you have questions about a medical condition or this instruction, always ask your healthcare professional. [place] [address] any warranty or liability for your use of this information. Colon Polyps: Care Instructions  Your Care Instructions     Colon polyps are growths in the colon or the rectum. The cause of most colon polyps is not known, and most people who get them do not have any problems. But a certain kind can turn into cancer. For this reason, regular testing for colon polyps is important for people as they get older. It is also important for anyone who has an increased risk for colon cancer. Polyps are usually found through routine colon cancer screening tests. Although most colon polyps are not cancerous, they are usually removed and then tested for cancer. Screening for colon cancer saves lives because the cancer can usually be cured if it is caught early. If you have a polyp that is the type that can turn into cancer, you may need more tests to examine your entire colon.  The doctor will remove any other polyps that he or she finds, and you will be tested more often. Follow-up care is a key part of your treatment and safety. Be sure to make and go to all appointments, and call your doctor if you are having problems. It's also a good idea to know your test results and keep a list of the medicines you take. How can you care for yourself at home? Regular exams to look for colon polyps are the best way to prevent polyps from turning into colon cancer. These can include stool tests, sigmoidoscopy, colonoscopy, and CT colonography. Talk with your doctor about a testing schedule that is right for you. To prevent polyps  There is no home treatment that can prevent colon polyps. But these steps may help lower your risk for cancer. · Stay active. Being active can help you get to and stay at a healthy weight. Try to exercise on most days of the week. Walking is a good choice. · Eat well. Choose a variety of vegetables, fruits, legumes (such as peas and beans), fish, poultry, and whole grains. · Do not smoke. If you need help quitting, talk to your doctor about stop-smoking programs and medicines. These can increase your chances of quitting for good. · If you drink alcohol, limit how much you drink. Limit alcohol to 2 drinks a day for men and 1 drink a day for women. When should you call for help? Call your doctor now or seek immediate medical care if:    · You have severe belly pain.     · Your stools are maroon or very bloody. Watch closely for changes in your health, and be sure to contact your doctor if:    · You have a fever.     · You have nausea or vomiting.     · You have a change in bowel habits (new constipation or diarrhea).     · Your symptoms get worse or are not improving as expected. Where can you learn more? Go to http://www.iClinical.com/  Enter C571 in the search box to learn more about \"Colon Polyps: Care Instructions. \"  Current as of: December 17, 2020               Content Version: 13.0  © 1589-7775 [employer]. Care instructions adapted under license by [employer] (which disclaims liability or warranty for this information). If you have questions about a medical condition or this instruction, always ask your healthcare professional. [address] any warranty or liability for your use of this information.

## 2021-12-15 NOTE — PROGRESS NOTES
conducted a pre-surgery visit with Vini Maldonado, who is a 64 y.o.,male. The  provided the following Interventions:  Initiated a relationship of care and support. Offered prayer and assurance of continued prayers on patient's behalf Patient does not have an advance directives. Plan:  Chaplains will continue to follow and will provide pastoral care on an as needed/requested basis.  recommends bedside caregivers page  on duty if patient shows signs of acute spiritual or emotional distress.   [address]ise[address]   Board Certified [address] [place] [address]   [phone]

## 2021-12-15 NOTE — ANESTHESIA POSTPROCEDURE EVALUATION
Procedure(s):  COLONOSCOPY with polypectomy. MAC    Anesthesia Post Evaluation      Multimodal analgesia: multimodal analgesia used between 6 hours prior to anesthesia start to PACU discharge  Patient location during evaluation: bedside  Patient participation: complete - patient participated  Level of consciousness: awake  Pain management: adequate  Airway patency: patent  Anesthetic complications: no  Cardiovascular status: stable  Respiratory status: acceptable  Hydration status: acceptable  Post anesthesia nausea and vomiting:  controlled      INITIAL Post-op Vital signs:   Vitals Value Taken Time   /84 12/15/21 0830   Temp 36.1 °C (96.9 °F) 12/15/21 0811   Pulse 90 12/15/21 0836   Resp 19 12/15/21 0836   SpO2 100 % 12/15/21 0832   Vitals shown include unvalidated device data.

## 2021-12-15 NOTE — ANESTHESIA PREPROCEDURE EVALUATION
Relevant Problems   No relevant active problems       Anesthetic History   No history of anesthetic complications            Review of Systems / Medical History  Patient summary reviewed and pertinent labs reviewed    Pulmonary  Within defined limits                 Neuro/Psych       CVA  TIA     Cardiovascular    Hypertension: well controlled              Exercise tolerance: >4 METS     GI/Hepatic/Renal                Endo/Other        Morbid obesity     Other Findings              Physical Exam    Airway  Mallampati: III  TM Distance: 4 - 6 cm  Neck ROM: decreased range of motion   Mouth opening: Normal     Cardiovascular    Rhythm: regular  Rate: normal         Dental  No notable dental hx       Pulmonary  Breath sounds clear to auscultation               Abdominal  GI exam deferred       Other Findings            Anesthetic Plan    ASA: 3  Anesthesia type: MAC            Anesthetic plan and risks discussed with: Patient

## 2021-12-15 NOTE — H&P
[email or web address]  [phone]      Impression:   1. Average risk colon cancer screening exam      Plan:     1. Colonoscopy      Addendum: All lab tests orders pertaining to the procedure have been ordered by the anesthesia personnel and results will be addressed by the anesthesia team    Chief Complaint: Average risk colon cancer screening exam.      HPI:  Veronica Owen is a 64 y.o. male who is being seen on consult for average risk colon cancer screening with colonoscopy    PMH:   Past Medical History:   Diagnosis Date    Anemia     Hypertension     [facility] [address] [facility]     Stroke ([facility] [address] [facility]     x 3       PSH:   Past Surgical History:   Procedure Laterality Date    COLONOSCOPY  2015       Social HX:   Social History     Socioeconomic History    Marital status: SINGLE     Spouse name: Not on file    Number of children: Not on file    Years of education: Not on file    Highest education level: Not on file   Occupational History    Not on file   Tobacco Use    Smoking status: Current Every Day Smoker     Packs/day: 1.50     Years: 15.00     Pack years: 22.50    Smokeless tobacco: Never Used   Vaping Use    Vaping Use: Never used   Substance and Sexual Activity    Alcohol use: Not Currently     Alcohol/week: 0.8 standard drinks     Types: 1 Cans of beer per week     Comment: Quit 2013    Drug use: Never    Sexual activity: Not Currently   Other Topics Concern    Not on file   Social History Narrative    ** Merged History Encounter **          Social Determinants of Health     Financial Resource Strain:     Difficulty of Paying Living Expenses: Not on file   Food Insecurity:     Worried About [address] in the Last Year: Not on file    [address] in the Last Year: Not on file   Transportation Needs:     Lack of Transportation (Medical): Not on file    Lack of Transportation (Non-Medical):  Not on file   Physical Activity:     Days of Exercise per Week: Not on file    Minutes of Exercise per Session: Not on file   Stress:     Feeling of Stress : Not on file   Social Connections:     Frequency of Communication with Friends and Family: Not on file    Frequency of Social Gatherings with Friends and Family: Not on file    Attends Religion Services: Not on file    Active Member of [address] [place] [address] or Organizations: Not on file    Attends Club or Organization Meetings: Not on file    Marital Status: Not on file   Intimate Partner Violence:     Fear of Current or Ex-Partner: Not on file    Emotionally Abused: Not on file    Physically Abused: Not on file    Sexually Abused: Not on file   Housing Stability:     Unable to Pay for Housing in the Last Year: Not on file    Number of [place] in the Last Year: Not on file    Unstable Housing in the Last Year: Not on file       FHX:   Family History   Problem Relation Age of Onset    Hypertension Mother     High Cholesterol Mother     Elevated Lipids Mother     COPD Father     Heart Failure Brother     Diabetes Brother        Allergy:   Allergies   Allergen Reactions    Codeine Other (comments)     VOMITING       Home Medications:     Medications Prior to Admission   Medication Sig    rosuvastatin (Crestor) 40 mg tablet Take 40 mg by mouth nightly.  gabapentin (NEURONTIN) 600 mg tablet Take 2 Tablets by mouth three (3) times daily. Max Daily Amount: 3,600 mg.    metFORMIN ER (GLUCOPHAGE XR) 500 mg tablet Take 1,000 mg by mouth daily.  lisinopriL (PRINIVIL, ZESTRIL) 5 mg tablet TAKE ONE TABLET BY MOUTH DAILY       Review of Systems:     Constitutional: No fevers, chills, weight loss, fatigue. Skin: No rashes, pruritis, jaundice, ulcerations, erythema. HENT: No headaches, nosebleeds, sinus pressure, rhinorrhea, sore throat. Eyes: No visual changes, blurred vision, eye pain, photophobia, jaundice. Cardiovascular: No chest pain, heart palpitations. Respiratory: No cough, SOB, wheezing, chest discomfort, orthopnea. Gastrointestinal: Neg unless noted otherwise in H&P   Genitourinary: No dysuria, bleeding, discharge, pyuria. Musculoskeletal: No weakness, arthralgias, wasting. Endo: No sweats. Heme: No bruising, easy bleeding. Allergies: As noted. Neurological: Cranial nerves intact. Alert and oriented. Gait not assessed. Psychiatric:  No anxiety, depression, hallucinations. Visit Vitals  /79 (BP 1 Location: Right upper arm, BP Patient Position: At rest)   Pulse 100   Temp 97.9 °F (36.6 °C)   Resp 18   Ht 6' (1.829 m)   Wt 127.6 kg (281 lb 3.2 oz)   SpO2 96%   BMI 38.14 kg/m²       Physical Assessment:     constitutional: appearance: well developed, well nourished, normal habitus, no deformities, in no acute distress. skin: inspection: no rashes, ulcers, icterus or other lesions; no clubbing or telangiectasias. palpation: no induration or subcutaneos nodules. eyes: inspection: normal conjunctivae and lids; no jaundice pupils: symmetrical, normoreactive to light, normal accommodation and size. ENMT: mouth: normal oral mucosa,lips and gums; good dentition. oropharynx: normal tongue, hard and soft palate; posterior pharynx without erithema, exudate or lesions. neck: thyroid: normal size, consistency and position; no masses or tenderness. respiratory: effort: normal chest excursion; no intercostal retraction or accessory muscle use. cardiovascular: abdominal aorta: normal size and position; no bruits. palpation: PMI of normal size and position; normal rhythm; no thrill or murmurs. abdominal: abdomen: normal consistency; no tenderness or masses. hernias: no hernias appreciated. liver: normal size and consistency. spleen: not palpable. rectal: hemoccult/guaiac: not performed. musculoskeletal: digits and nails: no clubbing, cyanosis, petechiae or other inflammatory conditions. gait: normal gait and station head and neck: normal range of motion; no pain, crepitation or contracture. spine/ribs/pelvis: normal range of motion; no pain, deformity or contracture. lymphatic: axilae: not palpable. groin: not palpable. neck: within normal limits. other: not palpable. neurologic: cranial nerves: II-XII normal.   psychiatric: judgement/insight: within normal limits. memory: within normal limits for recent and remote events. mood and affect: no evidence of depression, anxiety or agitation. orientation: oriented to time, space and person. Basic Metabolic Profile   No results for input(s): NA, K, CL, CO2, BUN, GLU, CA, MG, PHOS in the last 72 hours. No lab exists for component: CREAT      CBC w/Diff    No results for input(s): WBC, RBC, HGB, HCT, MCV, MCH, MCHC, RDW, PLT, HGBEXT, HCTEXT, PLTEXT in the last 72 hours. No lab exists for component: MPV No results for input(s): GRANS, LYMPH, EOS, PRO, MYELO, METAS, BLAST in the last 72 hours. No lab exists for component: MONO, BASO     Hepatic Function   No results for input(s): ALB, TP, TBILI, AP, AML, LPSE in the last 72 hours. No lab exists for component: DBILI, GPT, SGOT       Puneet Sheldon MD, M.D. [facility] [address] [address] 1947, [address] [facility]  [email or web address]. [email or web address]

## 2021-12-16 ENCOUNTER — OFFICE VISIT (OUTPATIENT)
Dept: ORTHOPEDIC SURGERY | Age: 56
End: 2021-12-16
Payer: MEDICARE

## 2021-12-16 VITALS
WEIGHT: 283 LBS | RESPIRATION RATE: 16 BRPM | DIASTOLIC BLOOD PRESSURE: 75 MMHG | OXYGEN SATURATION: 95 % | TEMPERATURE: 98.4 F | SYSTOLIC BLOOD PRESSURE: 126 MMHG | HEART RATE: 95 BPM | HEIGHT: 72 IN | BODY MASS INDEX: 38.33 KG/M2

## 2021-12-16 DIAGNOSIS — M54.16 LUMBAR RADICULOPATHY: Primary | ICD-10-CM

## 2021-12-16 PROCEDURE — G8427 DOCREV CUR MEDS BY ELIG CLIN: HCPCS | Performed by: PHYSICAL MEDICINE & REHABILITATION

## 2021-12-16 PROCEDURE — 3017F COLORECTAL CA SCREEN DOC REV: CPT | Performed by: PHYSICAL MEDICINE & REHABILITATION

## 2021-12-16 PROCEDURE — G8417 CALC BMI ABV UP PARAM F/U: HCPCS | Performed by: PHYSICAL MEDICINE & REHABILITATION

## 2021-12-16 PROCEDURE — G8432 DEP SCR NOT DOC, RNG: HCPCS | Performed by: PHYSICAL MEDICINE & REHABILITATION

## 2021-12-16 PROCEDURE — 99213 OFFICE O/P EST LOW 20 MIN: CPT | Performed by: PHYSICAL MEDICINE & REHABILITATION

## 2021-12-16 RX ORDER — DULOXETIN HYDROCHLORIDE 30 MG/1
30 CAPSULE, DELAYED RELEASE ORAL DAILY
COMMUNITY

## 2021-12-16 NOTE — PROGRESS NOTES
[address]  [address], [address]  [place], [address] [place] [address] Street  Phone: [phone]  Fax: [phone]        Nirmala Kim  : 1965  PCP: Roel Byers MD  2021    PROGRESS NOTE      HISTORY OF PRESENT ILLNESS  Veronica Owen is a 64 y.o. male who was seen as a new patient 2020 with c/o hx of stroke(speech deficit) c/o left low leg and low back pain x 4 weeks. He had been doing yard work planting bulbs for a few days prior to his symptoms. Since onset, it worsened despite having gone to the ED twice. None of the medications helped including medrol, muscle relaxers, NSAIDS, tylenol. He had constant symptoms that were provoked mainly by standing and walking. Sitting and laying were positions of comfort. The symptoms ran from the buttock to the side of the leg and top of the foot. His strength was intact, and he had no sensory deficit at the time. He continued to have low back pain radiating into the LLE into the top of the foot. He did not find improvement from a Prednisone dose pack. He did not see much benefit from Gabapentin 900 mg TID. Pt returned with increased symptoms of low back pain radiating into the LLE. He found some benefit initially from Lyrica 225 mg BID but noted that it did not last very long. Pt has been intolerant to [address] secondary to vomiting, blurry vision. He attended PT (-2020; [facility]) with some benefit. Lumbar spine MRI dated 2020 reviewed. Per report, L5-S1 demonstrates degenerative changes and left larger than right herniations into the neuroforamina compression of the left L5 nerve root. Active inflammation around the L5-S1 disc space. Active inflammation around the left L4-5 facet synovial lining. He underwent a left L5 TFESI (2020; Dr. Armando Sprung some relief of his LLE pain. He continues to have some residual LLE symptoms, especially in the left buttock, but overall, it has improved.  He continued to have LLE paraesthesia to the calf. He found some benefit with Prednisone. He found benefit from a second left L5 TFESI (12/15/2020; Dr. Lupe Reid) for about 5 days. He also found improvement from Amitriptyline 75 mg QHS.  A LLE EMG dated 1/8/21 was suggestive of:  1. Sensory polyneuropathy - this is based on undetectable sensory responses. 2. Chronic left L5 radiculopathy - this is based on signs of chronicity throughout the L5 myotome. He saw Dr. Noemy Culp and opted to not proceed with surgery. He ran out of Amitriptyline, but he has found it beneficial. He stopped Gabapentin as he thought it was causing headaches. He underwent a left L5 TFESI (10/12/21; Dr. Lupe Reid) without much benefit. He saw NP Claudell Stalling who advised he wean off of Amitriptyline and begin Cymbalta 30 mg daily. Sonny Garza comes in to the office today for f/u. He did not begin Cymbalta yet because he has some questions in regards to taking it with Amitriptyline. Pain Score: 4/10. Treatments patient has tried:  Physical Therapy - yes 2020  Medications - GABAPENTIN (headaches), Prednisone, LYRICA, MDP, muscle relaxants, NSAIDs  Injections - Left L5 TFESI (9/22/2020) with benefit; left L5 (12/15/20)     PmHx: stroke (speech deficits)    ASSESSMENT  Sonny Garza is a 64 y.o. male with c/o low back pain radiating into the LLE. His symptoms are likely due to a left L5/S1 radiculopathy. He had a positive SLR, weakness with ankle PF, and decreased sensation in an S1 distribution on the L.     PLAN  1. Begin Cymbalta 30 mg daily as previously prescribed. Pt will f/u in 10 weeks or sooner as needed. Diagnoses and all orders for this visit:    1.  Lumbar radiculopathy         PAST MEDICAL HISTORY   Past Medical History:   Diagnosis Date    Anemia     Hypertension     [facility] ([facility] [address]     Stroke ([facility] [address]     x 3       Past Surgical History:   Procedure Laterality Date    COLONOSCOPY  2015    COLONOSCOPY N/A 12/15/2021 COLONOSCOPY with polypectomy performed by Belgica Montaño MD at [facility]   . MEDICATIONS    Current Outpatient Medications   Medication Sig Dispense Refill    rosuvastatin (Crestor) 40 mg tablet Take 40 mg by mouth nightly.  gabapentin (NEURONTIN) 600 mg tablet Take 2 Tablets by mouth three (3) times daily. Max Daily Amount: 3,600 mg. 180 Tablet 5    metFORMIN ER (GLUCOPHAGE XR) 500 mg tablet Take 1,000 mg by mouth daily.  lisinopriL (PRINIVIL, ZESTRIL) 5 mg tablet TAKE ONE TABLET BY MOUTH DAILY          ALLERGIES  Allergies   Allergen Reactions    Codeine Other (comments)     VOMITING          SOCIAL HISTORY    Social History     Socioeconomic History    Marital status: SINGLE   Tobacco Use    Smoking status: Current Every Day Smoker     Packs/day: 1.50     Years: 15.00     Pack years: 22.50    Smokeless tobacco: Never Used   Vaping Use    Vaping Use: Never used   Substance and Sexual Activity    Alcohol use: Not Currently     Alcohol/week: 0.8 standard drinks     Types: 1 Cans of beer per week     Comment: Quit 2013    Drug use: Never    Sexual activity: Not Currently   Social History Narrative    ** Merged History Encounter **            FAMILY HISTORY  Family History   Problem Relation Age of Onset    Hypertension Mother     High Cholesterol Mother     Elevated Lipids Mother     COPD Father     Heart Failure Brother     Diabetes Brother          REVIEW OF SYSTEMS  Review of Systems   Constitutional: Negative for chills, fever and weight loss. Respiratory: Negative for shortness of breath. Cardiovascular: Negative for chest pain. Gastrointestinal: Negative for constipation. Negative for fecal incontinence    Genitourinary: Negative for dysuria. Negative for urinary incontinence   Musculoskeletal: Positive for back pain. Skin: Negative for rash. Neurological: Positive for tingling ( LLE). Negative for dizziness, tremors, focal weakness and headaches. Endo/Heme/Allergies: Does not bruise/bleed easily. Psychiatric/Behavioral: The patient does not have insomnia. PHYSICAL EXAMINATION  Visit Vitals  /75 (BP 1 Location: Right arm, BP Patient Position: Sitting)   Pulse 95   Temp 98.4 °F (36.9 °C) (Temporal)   Resp 16   Ht 6' (1.829 m)   Wt 283 lb (128.4 kg)   SpO2 95%   BMI 38.38 kg/m²       Pain Assessment  12/16/2021   Location of Pain Back;Buttocks   Location Modifiers Left   Severity of Pain 4   Quality of Pain Sharp   Quality of Pain Comment -   Duration of Pain Persistent   Frequency of Pain Constant   Aggravating Factors Walking   Aggravating Factors Comment -   Limiting Behavior Some   Relieving Factors Other (Comment)   Relieving Factors Comment -   Result of Injury -           Constitutional:  Well developed, well nourished, in no acute distress. Psychiatric: Affect and mood are appropriate. Integumentary: No rashes or abrasions noted on exposed areas. SPINE/MUSCULOSKELETAL EXAM    Lumbar spine:  No rash, ecchymosis, or gross obliquity. No fasciculations. No focal atrophy is noted. No pain with hip ROM. Full range of motion. No tenderness to palpation. No tenderness to palpation at the sciatic notch. SI joints non-tender.    Trochanters non tender.     Decreased sensation on the L in an S1 distribution.     Positive Straight Leg Raise on the Left.     MOTOR:      Biceps  Triceps Deltoids Wrist Ext Wrist Flex Hand Intrin   Right 5/5 5/5 5/5 5/5 5/5 5/5   Left 5/5 5/5 5/5 5/5 5/5 5/5             Hip Flex  Quads Hamstrings Ankle DF EHL Ankle PF   Right 5/5 5/5 5/5 5/5 5/5 5/5   Left 5/5 5/5 5/5 5/5 5/5 5/5     RADIOGRAPHS  LLE EMG by Dr. Ashley Kim 1/8/21:  NCV & EMG Findings:  Evaluation of the left Sup Fibular sensory nerve showed no response (14 cm), no response (Site 2), and no response (Site 3).  The left sural sensory nerve showed no response (Calf), no response (Site 2), and no response (Site 3).  All remaining nerves (as indicated in the following tables) were within normal limits.       Needle evaluation of the left gluteus medius muscle showed moderately increased polyphasic potentials.  The left anterior tibialis muscle showed increased motor unit amplitude and slightly increased polyphasic potentials.  The left posterior tibialis muscle showed slightly increased spontaneous activity, increased motor unit amplitude, and slightly increased polyphasic potentials.  All remaining muscles (as indicated in the following table) showed no evidence of electrical instability.       INTERPRETATION  This is an abnormal electrodiagnostic examination. These findings may be consistent with:   1. Sensory polyneuropathy - this is based on undetectable sensory responses.    2. Chronic left L5 radiculopathy - this is based on signs of chronicity throughout the L5 myotome.         Lumbar MRI images taken on 8/27/2020 personally reviewed with patient:  Alignment: Intact lordosis  Vertebral body height: Normal  Marrow signal: Unremarkable  Disc spaces: Preserved height and signal intensity  Conus: Terminates at T12-L1     Axial imaging correlation:     T11-12: There is mild signal loss reflecting some early intradiscal degenerative  change     L1-2: Patent canal and foramina.     L2-3: Patent canal and foramina.     L3-4: Signal loss is identified within the disc space. Disc space is not  narrowed. Central canal neuroforamina bilaterally patent. L4-5: Mild signal loss is identified there is a small central protrusion-type  Herniation.  There is some increased signal in the synovium of the left L4-5 disc space not seen on adjacent levels consistent with some active inflammation of the synovial lining here may be the most significant pain generator on this exam.     Left and right neuroforamina are spared.     L5-S1: moderate signal loss mild narrowing of the disks present     Incidental anterior herniation and osteophytic formation also identified     Endplates demonstrate decreased signal T1 and increased on T2 consistent with Modic endplate changes reflecting active inflammatory change     The central canal is intact     The left neuroforamina demonstrates herniation osteophyte extending into the  neuroforamina. In addition there are some mild facet arthropathy. These changes appear to be compressing the exiting L5 root on the more lateral sagittal cuts. See image 4 series 3. These changes do not extend into the central canal to cause mass effect on the exiting left S1 nerve root     Right neuroforamina demonstrates similar osteophyte herniation call, defect  however now smaller. Defect does touch inferior surface of the exiting right L5  nerve root. Nerve does not appear compressed or edematous.      Other structures: Unremarkable.        IMPRESSION  IMPRESSION:     L5-S1 demonstrates degenerative changes and left larger than right herniations  into the neuroforamina compression of the left L5 nerve root  Active inflammation around the L5-S1 disc space  Active inflammation around the left L4-5 facet synovial lining    Lumbar x-ray images taken on 4/30/2020 personally reviewed with patient:  FINDINGS: 5 views of the lumbar spine obtained. Vertebral body heights  preserved. Disc space loss at L5-S1. Lower lumbar endplate spurring. No  significant listhesis. Lumbar lordosis maintained. No discrete pars defects  identified. Lower lumbar mild facet arthropathy. No acute fracture identified.     IMPRESSION  IMPRESSION:     No clearly acute findings. Notable lower lumbar degenerative changes. 10 minutes of face-to-face contact were spent with the patient during today's visit extensively discussing symptoms and treatment plan. All questions were answered. More than half of this visit today was spent on counseling.      Written by Reji Son as dictated by Nick Sloan MD

## 2022-02-11 ENCOUNTER — HOSPITAL ENCOUNTER (EMERGENCY)
Age: 57
Discharge: HOME | End: 2022-02-11
Attending: STUDENT IN AN ORGANIZED HEALTH CARE EDUCATION/TRAINING PROGRAM
Payer: MEDICARE

## 2022-02-11 ENCOUNTER — APPOINTMENT (OUTPATIENT)
Dept: CT IMAGING | Age: 57
End: 2022-02-11
Attending: NURSE PRACTITIONER
Payer: MEDICARE

## 2022-02-11 VITALS
RESPIRATION RATE: 20 BRPM | DIASTOLIC BLOOD PRESSURE: 96 MMHG | SYSTOLIC BLOOD PRESSURE: 147 MMHG | HEART RATE: 99 BPM | OXYGEN SATURATION: 95 % | TEMPERATURE: 97.8 F

## 2022-02-11 DIAGNOSIS — H57.12 PAIN OF LEFT EYE: ICD-10-CM

## 2022-02-11 DIAGNOSIS — J01.00 ACUTE NON-RECURRENT MAXILLARY SINUSITIS: Primary | ICD-10-CM

## 2022-02-11 PROCEDURE — 74011000250 HC RX REV CODE- 250: Performed by: NURSE PRACTITIONER

## 2022-02-11 PROCEDURE — 70450 CT HEAD/BRAIN W/O DYE: CPT

## 2022-02-11 PROCEDURE — 99283 EMERGENCY DEPT VISIT LOW MDM: CPT

## 2022-02-11 RX ORDER — PROPARACAINE HYDROCHLORIDE 5 MG/ML
1 SOLUTION/ DROPS OPHTHALMIC
Status: COMPLETED | OUTPATIENT
Start: 2022-02-11 | End: 2022-02-11

## 2022-02-11 RX ORDER — AMOXICILLIN AND CLAVULANATE POTASSIUM 875; 125 MG/1; MG/1
1 TABLET, FILM COATED ORAL 2 TIMES DAILY
Qty: 20 TABLET | Refills: 0 | Status: SHIPPED | OUTPATIENT
Start: 2022-02-11 | End: 2022-02-21

## 2022-02-11 RX ADMIN — FLUORESCEIN SODIUM 1 STRIP: 0.6 STRIP OPHTHALMIC at 12:11

## 2022-02-11 RX ADMIN — PROPARACAINE HYDROCHLORIDE 1 DROP: 5 SOLUTION/ DROPS OPHTHALMIC at 12:11

## 2022-02-11 NOTE — ED PROVIDER NOTES
EMERGENCY DEPARTMENT HISTORY AND PHYSICAL EXAM    12:14 PM      Date: 2/11/2022  Patient Name: Vanessa Nguyen    History of Presenting Illness     Chief Complaint   Patient presents with    Eye Pain       History Provided By: Patient    Additional History (Context): Vanessa Nguyen is a 62 y.o. male with past medical history significant for anemia, hypertension, obesity, stroke, tobacco abuse, and diabetes who presents with complaints of pain around the left eye that started 5 days ago. He denies associated headache, visual changes, weakness, trouble with speech or balance, fever, rash, or injury/trauma/fall. He has not taken anything for pain prior at home. He does wear glasses for distance and denies any contact lens use. No double vision. PCP: Kwadwo Linares MD    Current Outpatient Medications   Medication Sig Dispense Refill    amoxicillin-clavulanate (Augmentin) 875-125 mg per tablet Take 1 Tablet by mouth two (2) times a day for 10 days. 20 Tablet 0    DULoxetine (Cymbalta) 30 mg capsule Take 30 mg by mouth daily.  rosuvastatin (Crestor) 40 mg tablet Take 40 mg by mouth nightly.  gabapentin (NEURONTIN) 600 mg tablet Take 2 Tablets by mouth three (3) times daily. Max Daily Amount: 3,600 mg. 180 Tablet 5    metFORMIN ER (GLUCOPHAGE XR) 500 mg tablet Take 1,000 mg by mouth daily.       lisinopriL (PRINIVIL, ZESTRIL) 5 mg tablet TAKE ONE TABLET BY MOUTH DAILY         Past History     Past Medical History:  Past Medical History:   Diagnosis Date    Anemia     Hypertension     [facility] ([facility] [address]     Stroke ([facility] [address]     x 3       Past Surgical History:  Past Surgical History:   Procedure Laterality Date    COLONOSCOPY  2015    COLONOSCOPY N/A 12/15/2021    COLONOSCOPY with polypectomy performed by Serge Mclaughlin MD at [facility]       Family History:  Family History   Problem Relation Age of Onset    Hypertension Mother     High Cholesterol Mother     Elevated Lipids Mother  COPD Father     Heart Failure Brother     Diabetes Brother        Social History:  Social History     Tobacco Use    Smoking status: Current Every Day Smoker     Packs/day: 1.50     Years: 15.00     Pack years: 22.50    Smokeless tobacco: Never Used   Vaping Use    Vaping Use: Never used   Substance Use Topics    Alcohol use: Not Currently     Alcohol/week: 0.8 standard drinks     Types: 1 Cans of beer per week     Comment: Quit 2013    Drug use: Never       Allergies: Allergies   Allergen Reactions    Codeine Other (comments)     VOMITING         Review of Systems       Review of Systems   Constitutional: Negative. Negative for chills and fever. HENT: Negative. Negative for congestion, ear pain and rhinorrhea. Eyes: Positive for pain. Negative for photophobia, discharge, redness, itching and visual disturbance. Respiratory: Negative. Negative for cough and shortness of breath. Cardiovascular: Negative. Negative for chest pain, palpitations and leg swelling. Gastrointestinal: Negative. Negative for abdominal pain, constipation, diarrhea, nausea and vomiting. Genitourinary: Negative. Negative for dysuria, frequency, hematuria and urgency. Musculoskeletal: Negative. Negative for back pain, gait problem, joint swelling and neck pain. Skin: Negative. Negative for rash and wound. Neurological: Negative. Negative for dizziness, tremors, seizures, syncope, facial asymmetry, speech difficulty, weakness, light-headedness, numbness and headaches. Hematological: Negative for adenopathy. Does not bruise/bleed easily. All other systems reviewed and are negative. Physical Exam     Visit Vitals  BP (!) 147/96   Pulse 99   Temp 97.8 °F (36.6 °C)   Resp 20   SpO2 95%         Physical Exam  Vitals and nursing note reviewed. Constitutional:       General: He is not in acute distress. Appearance: Normal appearance. He is obese.  He is not ill-appearing, toxic-appearing or diaphoretic. HENT:      Head: Normocephalic and atraumatic. Jaw: There is normal jaw occlusion. No trismus. Salivary Glands: Right salivary gland is not diffusely enlarged or tender. Left salivary gland is not diffusely enlarged or tender. Right Ear: Tympanic membrane, ear canal and external ear normal.      Left Ear: Tympanic membrane, ear canal and external ear normal.      Nose: Congestion present. No rhinorrhea. Right Sinus: No maxillary sinus tenderness or frontal sinus tenderness. Left Sinus: Maxillary sinus tenderness present. No frontal sinus tenderness. Mouth/Throat:      Mouth: Mucous membranes are moist.      Dentition: Normal dentition. No dental tenderness. Tongue: No lesions. Palate: No lesions. Pharynx: Oropharynx is clear. Uvula midline. No pharyngeal swelling, oropharyngeal exudate, posterior oropharyngeal erythema or uvula swelling. Tonsils: No tonsillar exudate or tonsillar abscesses. 0 on the right. 0 on the left. Eyes:      General: Lids are normal. Lids are everted, no foreign bodies appreciated. Vision grossly intact. Gaze aligned appropriately. No visual field deficit or scleral icterus. Right eye: No discharge. Left eye: No foreign body or discharge. Extraocular Movements: Extraocular movements intact. Right eye: Normal extraocular motion and no nystagmus. Left eye: Normal extraocular motion and no nystagmus. Conjunctiva/sclera: Conjunctivae normal.      Right eye: Right conjunctiva is not injected. No chemosis or exudate. Left eye: Left conjunctiva is not injected. No chemosis, exudate or hemorrhage. Pupils: Pupils are equal, round, and reactive to light. Left eye: No fluorescein uptake. Alonso exam negative. Funduscopic exam:        Left eye: No hemorrhage, exudate, AV nicking, arteriolar narrowing or papilledema. Red reflex and venous pulsations present.      Slit lamp exam:     Left eye: Anterior chamber quiet. No photophobia. Cardiovascular:      Rate and Rhythm: Normal rate and regular rhythm. Pulses: Normal pulses. Heart sounds: Normal heart sounds. No murmur heard. No gallop. Pulmonary:      Effort: Pulmonary effort is normal. No respiratory distress. Breath sounds: Normal breath sounds. No stridor. No wheezing, rhonchi or rales. Chest:      Chest wall: No tenderness. Abdominal:      General: Abdomen is flat. Palpations: Abdomen is soft. Tenderness: There is no abdominal tenderness. There is no right CVA tenderness, left CVA tenderness, guarding or rebound. Musculoskeletal:         General: Normal range of motion. Cervical back: Full passive range of motion without pain, normal range of motion and neck supple. No rigidity or tenderness. Lymphadenopathy:      Cervical: No cervical adenopathy. Skin:     General: Skin is warm and dry. Capillary Refill: Capillary refill takes less than 2 seconds. Findings: No rash. Neurological:      General: No focal deficit present. Mental Status: He is alert and oriented to person, place, and time. Psychiatric:         Mood and Affect: Mood normal.             Diagnostic Study Results     Labs -  No results found for this or any previous visit (from the past 12 hour(s)). Radiologic Studies -   CT HEAD WO CONT   Final Result   No acute intracranial abnormality. Left ethmoid and maxillary sinus disease, with complete opacification of the   left maxillary sinus. Chronic left basal ganglia to corona radiata infarct. Small chronic infarct left   temporal lobe. Medical Decision Making   I am the first provider for this patient. I reviewed available nursing notes, past medical history, past surgical history, family history and social history. Vital Signs-Reviewed the patient's vital signs.     Records Reviewed: Nursing Notes and Old Medical Records (Time of Review: 12:14 PM)    Pulse Oximetry Analysis - 95% on RA- normal     ED Course: Progress Notes, Reevaluation, and Consults:  12:14 PM  Initial assessment performed. The patients presenting problems have been discussed, and they/their family are in agreement with the care plan formulated and outlined with them. I have encouraged them to ask questions as they arise throughout their visit. Eye Stain      Date/Time: 2/11/2022 12:26 PM    Performed by: NP  Supervising provider: Leena Orellana        Corneal abrasion was not present on eyelid eversion. Cornea is clear. Anterior chamber is clear. Patient tolerance: patient tolerated the procedure well with no immediate complications  My total time at bedside, performing this procedure was 1-15 minutes. Comments: The OS eye was anesthetized with 2 drops of proparacaine. No gross foreign body seen with eversion of eyelids. Fluorescein stain reveals no uptake of dye or dendritic lesions. No hyphema, hypopyon, streaming, chemosis, anterior chamber bulging, or periorbital swelling, redness, dendritic lesion, or rash. No pain with EOMs. Negative Alonso sign. Patient tolerated exam well. 12:30 PM: Spoke with ophthalmology office, [facility] and they are willing to see the patient today at 1:55 PM.  Pending CT scan at this time. 1:25 PM patient reassessed and there are no changes to his physical exam findings or complaints. Vision remains normal and at baseline with no complaints of visual changes. CT results reviewed with the patient. No acute intracranial abnormality but there is left ethmoid and maxillary sinus disease with complete opacification of the left maxillary sinus. There is also chronic left basal ganglia to corona radiata infarct which is consistent with patient's history of CVA. Patient will be treated for acute left maxillary sinusitis with Augmentin and will go see ophthalmology today.   I still suspect that there is a component of early herpes zoster in the absence of a rash at this time. He has no dendritic lesions on Woods lamp examination and no pain in the eye or with extraocular movements. He is to start the antibiotics and take this until complete. We discussed strict ER return precautions and patient is in agreement and happy with this plan. Provider Notes (Medical Decision Making):     EYE EXAM:      Differential diagnosis: Early septal/preseptal cellulitis, herpes zoster, corneal ulcer/abrasion    Exam is not consistent with globe rupture, iritis, foreign body, hyphema, or corneal ulcer. Vision is at baseline per patient visual acuity with glasses is normal.  No visual field defects and no pain with extraocular movements. Pupils are PERRLA needs no risk factors for ulceration due to no contact lens use. There is no rash to the face or vesicular lesions to suggest herpes zoster. No erythema or warmth to the surrounding orbit consistent with periorbital or preseptal cellulitis. No pain, redness, or warmth over the temporal arteries bilaterally. Patient does have pain over the left maxillary sinus and just inferior and lateral to the orbital bone. Patient has a completely normal neurologic exam with no focal motor or sensory deficits. He has no major deficits from his previous strokes. Do not suspect alarming uveitis or other vision threatening process. Diagnosis     Clinical Impression:   1. Acute non-recurrent maxillary sinusitis    2. Pain of left eye        Disposition: Discharged to ophthalmology    DISCHARGE NOTE:     Patient has been reexamined. Patient has no new complaints, changes, or physical findings. Care plan outlined and precautions discussed. Results of CT and physical exam findings were reviewed with the patient. All medications were reviewed with the patient; will discharge home with Augmentin. All of patient's questions and concerns were addressed.  Patient was instructed and agrees to follow up with ophthalmology, as well as to return to the ED upon further deterioration. Patient is ready to go home. Follow-up Information     Follow up With Specialties Details Why [address] [place] [address]. Today appointment at 1:55 PM [address] [place] [address], [address], [place] [address] [facility] EMERGENCY DEPT Emergency Medicine  As needed, If symptoms worsen [address] [place] [id number]  [phone]           Current Discharge Medication List      START taking these medications    Details   amoxicillin-clavulanate (Augmentin) 875-125 mg per tablet Take 1 Tablet by mouth two (2) times a day for 10 days. Qty: 20 Tablet, Refills: 0  Start date: 2/11/2022, End date: 2/21/2022               Dictation disclaimer:  Please note that this dictation was completed with Neu[employer], the computer voice recognition software. Quite often unanticipated grammatical, syntax, homophones, and other interpretive errors are inadvertently transcribed by the computer software. Please disregard these errors. Please excuse any errors that have escaped final proofreading.

## 2022-02-11 NOTE — DISCHARGE INSTRUCTIONS
Concern for possible early herpes zoster (shingles) on your face. We do not see any lesions in your eye, however, you need close follow-up with ophthalmology today as scheduled. CT scan of the head showed maxillary sinusitis on the left which could also be causing the pain. We will start antibiotics, take as prescribed until complete. Follow-up with your PCP in the next 2 to 3 days for recheck. Return to the ER at any time for visual changes, headache, fever, vomiting, or any new/worsening/persistent symptoms.

## 2022-02-11 NOTE — Clinical Note
[address] [place] [address] [address] [facility] EMERGENCY DEPT  [address] [address] [phone]    Work/School Note    Date: 2/11/2022    To Whom It May concern:    Loi White was seen and treated today in the emergency room by the following provider(s):  Attending Provider: Lucila Zamora DO  Nurse Practitioner: VENANCIO Gee. Loi White is excused from work/school on 02/11/22 and 02/12/22. He is medically clear to return to work/school on 2/13/2022.        Sincerely,          VENANCIO Ba

## 2022-02-11 NOTE — Clinical Note
[address] [place] [address] Dr [facility] EMERGENCY DEPT  [address] [address] [phone]    Work/School Note    Date: 2/11/2022    To Whom It May concern:    Annie Luna was seen and treated today in the emergency room by the following provider(s):  Attending Provider: Claudette Ramirez DO  Nurse Practitioner: VENANCIO Smith. Annie Luna is excused from work/school on 02/11/22 and 02/12/22. He is medically clear to return to work/school on 2/13/2022.        Sincerely,          VENANCIO Bardales

## 2022-02-11 NOTE — ED TRIAGE NOTES
C/o eye pain area around left orbit of the eye X 1 week. Denies blurred vision, recent injury to eye.

## 2022-03-03 ENCOUNTER — OFFICE VISIT (OUTPATIENT)
Dept: ORTHOPEDIC SURGERY | Age: 57
End: 2022-03-03
Payer: MEDICARE

## 2022-03-03 VITALS
OXYGEN SATURATION: 96 % | RESPIRATION RATE: 16 BRPM | HEIGHT: 72 IN | HEART RATE: 100 BPM | WEIGHT: 289 LBS | TEMPERATURE: 98.3 F | BODY MASS INDEX: 39.14 KG/M2

## 2022-03-03 DIAGNOSIS — M54.16 LUMBAR RADICULOPATHY: Primary | ICD-10-CM

## 2022-03-03 PROCEDURE — 99213 OFFICE O/P EST LOW 20 MIN: CPT | Performed by: PHYSICAL MEDICINE & REHABILITATION

## 2022-03-03 PROCEDURE — G8417 CALC BMI ABV UP PARAM F/U: HCPCS | Performed by: PHYSICAL MEDICINE & REHABILITATION

## 2022-03-03 PROCEDURE — 3017F COLORECTAL CA SCREEN DOC REV: CPT | Performed by: PHYSICAL MEDICINE & REHABILITATION

## 2022-03-03 PROCEDURE — G8427 DOCREV CUR MEDS BY ELIG CLIN: HCPCS | Performed by: PHYSICAL MEDICINE & REHABILITATION

## 2022-03-03 PROCEDURE — G8432 DEP SCR NOT DOC, RNG: HCPCS | Performed by: PHYSICAL MEDICINE & REHABILITATION

## 2022-03-03 RX ORDER — AMITRIPTYLINE HYDROCHLORIDE 75 MG/1
75 TABLET, FILM COATED ORAL
COMMUNITY

## 2022-03-03 RX ORDER — TOPIRAMATE 25 MG/1
TABLET ORAL
Qty: 90 TABLET | Refills: 2 | Status: SHIPPED | OUTPATIENT
Start: 2022-03-03 | End: 2022-05-03 | Stop reason: SDUPTHER

## 2022-03-03 NOTE — PROGRESS NOTES
Chief Complaint   Patient presents with    Follow-up       Pt preferred language for health care discussion is english. Is someone accompanying this pt? no    Is the patient using any DME equipment during [address]? no    Depression Screening:  3 most recent PHQ Screens 7/27/2020   Little interest or pleasure in doing things Not at all   Feeling down, depressed, irritable, or hopeless Not at all   Total Score PHQ 2 0       Learning Assessment:  Learning Assessment 7/27/2020   PRIMARY LEARNER Patient   HIGHEST LEVEL OF EDUCATION - PRIMARY LEARNER  GRADUATED HIGH SCHOOL OR GED   BARRIERS PRIMARY LEARNER VISUAL     COGNITIVE   CO-LEARNER CAREGIVER No   PRIMARY LANGUAGE ENGLISH   LEARNER PREFERENCE PRIMARY OTHER (COMMENT)   ANSWERED BY PATIENT   RELATIONSHIP SELF         Health Maintenance reviewed and discussed per provider. Yes        Advance Directive:  1. Do you have an advance directive in place? Patient Reply:no    2. If not, would you like material regarding how to put one in place? Patient Reply: no    Coordination of Care:  1. Have you been to the ER, urgent care clinic since your last visit? Hospitalized since your last visit? Yes for eye swelling     2. Have you seen or consulted any other health care providers outside of the [address] [place] [address] since your last visit? Include any pap smears or colon screening.  no

## 2022-03-03 NOTE — PROGRESS NOTES
[address]  [address], [address]  [place], [address] [place] [address] Street  Phone: [phone]  Fax: [phone]        Margaret Lim  : 1965  PCP: Pedro Mansfield MD  3/3/2022    PROGRESS NOTE      HISTORY OF PRESENT ILLNESS  Raghavendra Quesada is a 62 y.o. male who was seen as a new patient 2020 with c/o hx of stroke(speech deficit) c/o left low leg and low back pain x 4 weeks. He had been doing yard work planting bulbs for a few days prior to his symptoms. Since onset, it worsened despite having gone to the ED twice. None of the medications helped including medrol, muscle relaxers, NSAIDS, tylenol. He had constant symptoms that were provoked mainly by standing and walking. Sitting and laying were positions of comfort. The symptoms ran from the buttock to the side of the leg and top of the foot. His strength was intact, and he had no sensory deficit at the time. He continued to have low back pain radiating into the LLE into the top of the foot. He did not find improvement from a Prednisone dose pack. He did not see much benefit from Gabapentin 900 mg TID. Pt returned with increased symptoms of low back pain radiating into the LLE. He found some benefit initially from Lyrica 225 mg BID but noted that it did not last very long. Pt has been intolerant to [address] secondary to vomiting, blurry vision. He attended PT (-2020; [facility]"" [facility]) with some benefit. Lumbar spine MRI dated 2020 reviewed. Per report, L5-S1 demonstrates degenerative changes and left larger than right herniations into the neuroforamina compression of the left L5 nerve root. Active inflammation around the L5-S1 disc space. Active inflammation around the left L4-5 facet synovial lining. He underwent a left L5 TFESI (2020; Dr. Reyes Dry some relief of his LLE pain. He continues to have some residual LLE symptoms, especially in the left buttock, but overall, it has improved.  He continued to have LLE paraesthesia to the calf. He found some benefit with Prednisone. He found benefit from a second left L5 TFESI (12/15/2020; Dr. Deandre Castillo) for about 5 days. He also found improvement from Amitriptyline 75 mg QHS.  A LLE EMG dated 1/8/21 was suggestive of:  1. Sensory polyneuropathy - this is based on undetectable sensory responses. 2. Chronic left L5 radiculopathy - this is based on signs of chronicity throughout the L5 myotome. He saw Dr. Arcenio Brice and opted to not proceed with surgery. He ran out of Amitriptyline, but he has found it beneficial. He stopped Gabapentin as he thought it was causing headaches. He underwent a left L5 TFESI (10/12/21; Dr. Deandre Castillo) without much benefit. He saw [facility] who advised he wean off of Amitriptyline and begin Cymbalta 30 mg daily. Haley Patterson comes in to the office today for f/u. He has found some benefit with Amitriptyline, but he would like something stronger. He did not take Cymbalta. He continues to have low back pain occasionally radiating into the BLE. Pain Score: 7/10. Treatments patient has tried:  Physical Therapy - yes 2020  Medications - GABAPENTIN (headaches), Prednisone, LYRICA, MDP, muscle relaxants, NSAIDs  Injections - Left L5 TFESI (9/22/2020) with benefit; left L5 (12/15/20)     PmHx: stroke (speech deficits)    ASSESSMENT  Haley Patterson is a 62 y.o. male with c/o  low back pain radiating into the LLE and occasionally into the RLE as well. His symptoms are likely due to a left L5/S1 radiculopathy. He had a positive SLR, weakness with ankle PF, and decreased sensation in an S1 distribution on the L.     PLAN  1. Begin Topamax 3 x 25 mg QHS  2. He can call if he needs a refill of Amitriptyline. 3. Provided August Crisp Big 3 exercises to add to HEP. Pt will f/u in 8 weeks or sooner as needed. Diagnoses and all orders for this visit:    1. Lumbar radiculopathy  -     topiramate (TOPAMAX) 25 mg tablet;  Take 1 tab nightly 3 times, then take 2 tabs nightly 3 times, then take 3 tabs every night. PAST MEDICAL HISTORY   Past Medical History:   Diagnosis Date    Anemia     Hypertension     [facility]bid obesity ([facility] [address]     Stroke ([facility] [address]     x 3       Past Surgical History:   Procedure Laterality Date    COLONOSCOPY  2015    COLONOSCOPY N/A 12/15/2021    COLONOSCOPY with polypectomy performed by Serge Mclaughlin MD at [address] [place] [address] Place   . MEDICATIONS    Current Outpatient Medications   Medication Sig Dispense Refill    DULoxetine (Cymbalta) 30 mg capsule Take 30 mg by mouth daily.  rosuvastatin (Crestor) 40 mg tablet Take 40 mg by mouth nightly.  gabapentin (NEURONTIN) 600 mg tablet Take 2 Tablets by mouth three (3) times daily. Max Daily Amount: 3,600 mg. 180 Tablet 5    metFORMIN ER (GLUCOPHAGE XR) 500 mg tablet Take 1,000 mg by mouth daily.  lisinopriL (PRINIVIL, ZESTRIL) 5 mg tablet TAKE ONE TABLET BY MOUTH DAILY          ALLERGIES  Allergies   Allergen Reactions    Codeine Other (comments)     VOMITING          SOCIAL HISTORY    Social History     Socioeconomic History    Marital status: SINGLE   Tobacco Use    Smoking status: Current Every Day Smoker     Packs/day: 1.50     Years: 15.00     Pack years: 22.50    Smokeless tobacco: Never Used   Vaping Use    Vaping Use: Never used   Substance and Sexual Activity    Alcohol use: Not Currently     Alcohol/week: 0.8 standard drinks     Types: 1 Cans of beer per week     Comment: Quit 2013    Drug use: Never    Sexual activity: Not Currently   Social History Narrative    ** Merged History Encounter **            FAMILY HISTORY  Family History   Problem Relation Age of Onset    Hypertension Mother     High Cholesterol Mother     Elevated Lipids Mother     COPD Father     Heart Failure Brother     Diabetes Brother          REVIEW OF SYSTEMS  Review of Systems   Constitutional: Negative for chills, fever and weight loss.    Respiratory: Negative for shortness of breath. Cardiovascular: Negative for chest pain. Gastrointestinal: Negative for constipation. Negative for fecal incontinence    Genitourinary: Negative for dysuria. Negative for urinary incontinence   Musculoskeletal: Positive for back pain. Skin: Negative for rash. Neurological: Positive for tingling ( BLE). Negative for dizziness, tremors, focal weakness and headaches. Endo/Heme/Allergies: Does not bruise/bleed easily. Psychiatric/Behavioral: The patient does not have insomnia. PHYSICAL EXAMINATION  Visit Vitals  Pulse 100   Temp 98.3 °F (36.8 °C) (Tympanic)   Resp 16   Ht 6' (1.829 m)   Wt 289 lb (131.1 kg)   SpO2 96%   BMI 39.20 kg/m²       Pain Assessment  3/3/2022   Location of Pain Back;Leg;Buttocks   Pain Location Comment in legs at times   Location Modifiers Left;Right   Severity of Pain 5   Quality of Pain Aching   Quality of Pain Comment -   Duration of Pain Other (Comment)   Frequency of Pain Constant   Date Pain First Started 10/12/2021   Aggravating Factors (No Data)   Aggravating Factors Comment sitting   Limiting Behavior Some   Relieving Factors Rest   Relieving Factors Comment -   Result of Injury No           Constitutional:  Well developed, well nourished, in no acute distress. Psychiatric: Affect and mood are appropriate. Integumentary: No rashes or abrasions noted on exposed areas. SPINE/MUSCULOSKELETAL EXAM    Lumbar spine:  No rash, ecchymosis, or gross obliquity. No fasciculations. No focal atrophy is noted. No pain with hip ROM. Full range of motion. No tenderness to palpation. No tenderness to palpation at the sciatic notch. SI joints non-tender.    Trochanters non tender.     Decreased sensation on the L in an S1 distribution.     Positive Straight Leg Raise on the Left.     MOTOR:      Biceps  Triceps Deltoids Wrist Ext Wrist Flex Hand Intrin   Right 5/5 5/5 5/5 5/5 5/5 5/5   Left 5/5 5/5 5/5 5/5 5/5 5/5             Hip Flex Quads Hamstrings Ankle DF EHL Ankle PF   Right 5/5 5/5 5/5 5/5 5/5 5/5   Left 5/5 5/5 5/5 5/5 5/5 5/5     RADIOGRAPHS  LLE EMG by Dr. Allegra Dhaliwal 1/8/21:  NCV & EMG Findings:  Evaluation of the left Sup Fibular sensory nerve showed no response (14 cm), no response (Site 2), and no response (Site 3).  The left sural sensory nerve showed no response (Calf), no response (Site 2), and no response (Site 3).  All remaining nerves (as indicated in the following tables) were within normal limits.       Needle evaluation of the left gluteus medius muscle showed moderately increased polyphasic potentials.  The left anterior tibialis muscle showed increased motor unit amplitude and slightly increased polyphasic potentials.  The left posterior tibialis muscle showed slightly increased spontaneous activity, increased motor unit amplitude, and slightly increased polyphasic potentials.  All remaining muscles (as indicated in the following table) showed no evidence of electrical instability.       INTERPRETATION  This is an abnormal electrodiagnostic examination. These findings may be consistent with:   1. Sensory polyneuropathy - this is based on undetectable sensory responses.    2. Chronic left L5 radiculopathy - this is based on signs of chronicity throughout the L5 myotome.         Lumbar MRI images taken on 8/27/2020 personally reviewed with patient:  Alignment: Intact lordosis  Vertebral body height: Normal  Marrow signal: Unremarkable  Disc spaces: Preserved height and signal intensity  Conus: Terminates at T12-L1     Axial imaging correlation:     T11-12: There is mild signal loss reflecting some early intradiscal degenerative  change     L1-2: Patent canal and foramina.     L2-3: Patent canal and foramina.     L3-4: Signal loss is identified within the disc space. Disc space is not  narrowed. Central canal neuroforamina bilaterally patent.   L4-5: Mild signal loss is identified there is a small central protrusion-type  Herniation. There is some increased signal in the synovium of the left L4-5 disc space not seen on adjacent levels consistent with some active inflammation of the synovial lining here may be the most significant pain generator on this exam.     Left and right neuroforamina are spared.     L5-S1: moderate signal loss mild narrowing of the disks present     Incidental anterior herniation and osteophytic formation also identified     Endplates demonstrate decreased signal T1 and increased on T2 consistent with Modic endplate changes reflecting active inflammatory change     The central canal is intact     The left neuroforamina demonstrates herniation osteophyte extending into the  neuroforamina. In addition there are some mild facet arthropathy. These changes appear to be compressing the exiting L5 root on the more lateral sagittal cuts. See image 4 series 3. These changes do not extend into the central canal to cause mass effect on the exiting left S1 nerve root     Right neuroforamina demonstrates similar osteophyte herniation call, defect  however now smaller. Defect does touch inferior surface of the exiting right L5  nerve root. Nerve does not appear compressed or edematous.      Other structures: Unremarkable.        IMPRESSION  IMPRESSION:     L5-S1 demonstrates degenerative changes and left larger than right herniations  into the neuroforamina compression of the left L5 nerve root  Active inflammation around the L5-S1 disc space  Active inflammation around the left L4-5 facet synovial lining    Lumbar x-ray images taken on 4/30/2020 personally reviewed with patient:  FINDINGS: 5 views of the lumbar spine obtained. Vertebral body heights  preserved. Disc space loss at L5-S1. Lower lumbar endplate spurring. No  significant listhesis. Lumbar lordosis maintained. No discrete pars defects  identified. Lower lumbar mild facet arthropathy.  No acute fracture identified.     IMPRESSION  IMPRESSION:     No clearly acute findings. Notable lower lumbar degenerative changes. 10 minutes of face-to-face contact were spent with the patient during today's visit extensively discussing symptoms and treatment plan. All questions were answered. More than half of this visit today was spent on counseling.      Written by Frances Holley as dictated by Meir Bhakta MD

## 2022-03-20 PROBLEM — E66.01 SEVERE OBESITY (HCC): Status: ACTIVE | Noted: 2020-07-13

## 2022-05-03 ENCOUNTER — OFFICE VISIT (OUTPATIENT)
Dept: ORTHOPEDIC SURGERY | Age: 57
End: 2022-05-03
Payer: MEDICARE

## 2022-05-03 VITALS
OXYGEN SATURATION: 98 % | HEART RATE: 76 BPM | WEIGHT: 277 LBS | HEIGHT: 72 IN | TEMPERATURE: 98.1 F | BODY MASS INDEX: 37.52 KG/M2

## 2022-05-03 DIAGNOSIS — M54.16 LUMBAR RADICULOPATHY: Primary | ICD-10-CM

## 2022-05-03 DIAGNOSIS — Z86.73 HISTORY OF STROKE: ICD-10-CM

## 2022-05-03 PROCEDURE — 3017F COLORECTAL CA SCREEN DOC REV: CPT | Performed by: PHYSICAL MEDICINE & REHABILITATION

## 2022-05-03 PROCEDURE — G8432 DEP SCR NOT DOC, RNG: HCPCS | Performed by: PHYSICAL MEDICINE & REHABILITATION

## 2022-05-03 PROCEDURE — G8417 CALC BMI ABV UP PARAM F/U: HCPCS | Performed by: PHYSICAL MEDICINE & REHABILITATION

## 2022-05-03 PROCEDURE — G8427 DOCREV CUR MEDS BY ELIG CLIN: HCPCS | Performed by: PHYSICAL MEDICINE & REHABILITATION

## 2022-05-03 PROCEDURE — 99213 OFFICE O/P EST LOW 20 MIN: CPT | Performed by: PHYSICAL MEDICINE & REHABILITATION

## 2022-05-03 RX ORDER — TOPIRAMATE 25 MG/1
TABLET ORAL
Qty: 90 TABLET | Refills: 5 | Status: SHIPPED
Start: 2022-05-03 | End: 2022-11-03 | Stop reason: DRUGHIGH

## 2022-05-03 RX ORDER — CYCLOBENZAPRINE HCL 10 MG
10 TABLET ORAL
Qty: 90 TABLET | Refills: 5 | Status: SHIPPED | OUTPATIENT
Start: 2022-05-03

## 2022-05-03 RX ORDER — GABAPENTIN 600 MG/1
1200 TABLET ORAL 3 TIMES DAILY
Qty: 180 TABLET | Refills: 5 | Status: SHIPPED | OUTPATIENT
Start: 2022-05-03 | End: 2022-11-03 | Stop reason: SDUPTHER

## 2022-05-03 NOTE — PROGRESS NOTES
Krunal Sanford presents today for   Chief Complaint   Patient presents with    Back Pain     lower       Is someone accompanying this pt? no    Is the patient using any DME equipment during OV? no    Depression Screening:  3 most recent PHQ Screens 7/27/2020   Little interest or pleasure in doing things Not at all   Feeling down, depressed, irritable, or hopeless Not at all   Total Score PHQ 2 0       Learning Assessment:  Learning Assessment 7/27/2020   PRIMARY LEARNER Patient   HIGHEST LEVEL OF EDUCATION - PRIMARY LEARNER  GRADUATED HIGH SCHOOL OR GED   BARRIERS PRIMARY LEARNER VISUAL     COGNITIVE   CO-LEARNER CAREGIVER No   PRIMARY LANGUAGE ENGLISH   LEARNER PREFERENCE PRIMARY OTHER (COMMENT)   ANSWERED BY PATIENT   RELATIONSHIP SELF       Abuse Screening:  Abuse Screening Questionnaire 7/27/2020   Do you ever feel afraid of your partner? N   Are you in a relationship with someone who physically or mentally threatens you? N   Is it safe for you to go home? Y       Coordination of Care:  1. Have you been to the ER, urgent care clinic since your last visit? no  Hospitalized since your last visit? no    2. Have you seen or consulted any other health care providers outside of the [address] [place] [address] since your last visit? no Include any pap smears or colon screening.  no

## 2022-05-03 NOTE — LETTER
5/3/2022    Patient: Giovani Paris   YOB: 1965   Date of Visit: Dennis Aguilar MD  [address] [facility], [place] [address]  Via Fax: [phone]    Dear Aashish Cameron MD,      Thank you for referring Mr. Giovani Paris to [facility] for evaluation. My notes for this consultation are attached. If you have questions, please do not hesitate to call me. I look forward to following your patient along with you.       Sincerely,    Bunny Brambila MD

## 2022-05-03 NOTE — PROGRESS NOTES
[address]  [address], [address]  [place], [address] [place] [address] Street  Phone: [phone]  Fax: [phone]        Fatuma Hennessy  : 1965  PCP: Mady Lamb MD  5/3/2022    PROGRESS NOTE      HISTORY OF PRESENT ILLNESS  Mario Alberto Chen is a 62 y.o. male who was seen as a new patient 2020 with c/o hx of stroke(speech deficit) c/o left low leg and low back pain x 4 weeks. He had been doing yard work planting bulbs for a few days prior to his symptoms. Since onset, it worsened despite having gone to the ED twice. None of the medications helped including medrol, muscle relaxers, NSAIDS, tylenol. He had constant symptoms that were provoked mainly by standing and walking. Sitting and laying were positions of comfort. The symptoms ran from the buttock to the side of the leg and top of the foot. His strength was intact, and he had no sensory deficit at the time. He continued to have low back pain radiating into the LLE into the top of the foot. He did not find improvement from a Prednisone dose pack. He did not see much benefit from Gabapentin 900 mg TID. Pt returned with increased symptoms of low back pain radiating into the LLE. He found some benefit initially from Lyrica 225 mg BID but noted that it did not last very long. Pt has been intolerant to [address] secondary to vomiting, blurry vision. He attended PT (-2020; [facility]"" [facility]) with some benefit. Lumbar spine MRI dated 2020 reviewed. Per report, L5-S1 demonstrates degenerative changes and left larger than right herniations into the neuroforamina compression of the left L5 nerve root. Active inflammation around the L5-S1 disc space. Active inflammation around the left L4-5 facet synovial lining. He underwent a left L5 TFESI (2020; Dr. Mendez Plenty some relief of his LLE pain. He continues to have some residual LLE symptoms, especially in the left buttock, but overall, it has improved.  He continued to have LLE paraesthesia to the calf. He found some benefit with Prednisone. He found benefit from a second left L5 TFESI (12/15/2020; Dr. Bryna Canavan) for about 5 days. He also found improvement from Amitriptyline 75 mg QHS.  A LLE EMG dated 1/8/21 was suggestive of:  1. Sensory polyneuropathy - this is based on undetectable sensory responses. 2. Chronic left L5 radiculopathy - this is based on signs of chronicity throughout the L5 myotome. He saw Dr. Enriqueta Velázquez and opted to not proceed with surgery. He ran out of Amitriptyline, but he has found it beneficial. He stopped Gabapentin as he thought it was causing headaches. He underwent a left L5 TFESI (10/12/21; Dr. Amish Ivory much benefit. He saw NP Herbie Francis who advised he wean off of Amitriptyline and begin Cymbalta 30 mg daily. He found some benefit with Amitriptyline, but he would like something stronger. He did not take Cymbalta. He continued to have low back pain occasionally radiating into the BLE. Lety Lawrence comes in to the office today for f/u. He has tried Topamax, but it caused some pains and weakness in arms. However, he felt that it did help his leg symptoms and weight loss. He continues to have low back pain. Pain Score: 9/10. Treatments patient has tried:  Physical Therapy - yes 2020  Medications - GABAPENTIN (headaches), Prednisone, LYRICA, MDP, muscle relaxants, NSAIDs, TOPAMAX, Amitriptyline  Injections - Left L5 TFESI (9/22/2020) with benefit; left L5 (12/15/20)     PmHx: stroke (speech deficits)    ASSESSMENT  Lety Lawrence is a 62 y.o. male with c/o low back pain radiating into the LLE and occasionally into the RLE as well. His symptoms are likely due to a left L5/S1 radiculopathy. He had a positive SLR, weakness with ankle PF, and decreased sensation in an S1 distribution on the L.     PLAN. 1. Trokendi 100 mg daily - try copay card online to help with cost  2. Refill Topamax 3 x 25 mg QHS.   3. Refill Gabapentin 2 x 600 mg TID.  4. Flexeril 10 mg TID PRN. Advised on somnolence. 5. Maintain consistent HEP. Pt will f/u in 6 months or sooner as needed. Diagnoses and all orders for this visit:    1. Lumbar radiculopathy    2. History of stroke       PAST MEDICAL HISTORY   Past Medical History:   Diagnosis Date    Anemia     Hypertension     Morbid obesity ([facility] [address]     Stroke ([facility] [address]     x 3       Past Surgical History:   Procedure Laterality Date    COLONOSCOPY  2015    COLONOSCOPY N/A 12/15/2021    COLONOSCOPY with polypectomy performed by Quirino Li MD at [address] [place], [place] [address]Nd Place   . MEDICATIONS    Current Outpatient Medications   Medication Sig Dispense Refill    amitriptyline (ELAVIL) 75 mg tablet Take 75 mg by mouth nightly.  topiramate (TOPAMAX) 25 mg tablet Take 1 tab nightly 3 times, then take 2 tabs nightly 3 times, then take 3 tabs every night. 90 Tablet 2    DULoxetine (Cymbalta) 30 mg capsule Take 30 mg by mouth daily.  rosuvastatin (Crestor) 40 mg tablet Take 40 mg by mouth nightly.  gabapentin (NEURONTIN) 600 mg tablet Take 2 Tablets by mouth three (3) times daily. Max Daily Amount: 3,600 mg. 180 Tablet 5    metFORMIN ER (GLUCOPHAGE XR) 500 mg tablet Take 1,000 mg by mouth daily.       lisinopriL (PRINIVIL, ZESTRIL) 5 mg tablet TAKE ONE TABLET BY MOUTH DAILY          ALLERGIES  Allergies   Allergen Reactions    Codeine Other (comments)     VOMITING          SOCIAL HISTORY    Social History     Socioeconomic History    Marital status: SINGLE   Tobacco Use    Smoking status: Current Every Day Smoker     Packs/day: 1.50     Years: 15.00     Pack years: 22.50    Smokeless tobacco: Never Used   Vaping Use    Vaping Use: Never used   Substance and Sexual Activity    Alcohol use: Not Currently     Alcohol/week: 0.8 standard drinks     Types: 1 Cans of beer per week     Comment: Quit 2013    Drug use: Never    Sexual activity: Not Currently   Social History Narrative    ** Merged History Encounter **            FAMILY HISTORY  Family History   Problem Relation Age of Onset    Hypertension Mother     High Cholesterol Mother     Elevated Lipids Mother     COPD Father     Heart Failure Brother     Diabetes Brother          REVIEW OF SYSTEMS  Review of Systems   Constitutional: Negative for chills, fever and weight loss. Respiratory: Negative for shortness of breath. Cardiovascular: Negative for chest pain. Gastrointestinal: Negative for constipation. Negative for fecal incontinence    Genitourinary: Negative for dysuria. Negative for urinary incontinence   Musculoskeletal: Positive for back pain. Skin: Negative for rash. Neurological: Negative for dizziness, tingling, tremors, focal weakness and headaches. Endo/Heme/Allergies: Does not bruise/bleed easily. Psychiatric/Behavioral: The patient does not have insomnia. PHYSICAL EXAMINATION  Visit Vitals  Pulse 76   Temp 98.1 °F (36.7 °C) (Temporal)   Ht 6' (1.829 m)   Wt 277 lb (125.6 kg)   SpO2 98% Comment: RA   BMI 37.57 kg/m²       Pain Assessment  5/3/2022   Location of Pain Back   Pain Location Comment -   Location Modifiers -   Severity of Pain 9   Quality of Pain Other (Comment)   Quality of Pain Comment it just hurts   Duration of Pain Persistent   Frequency of Pain Constant   Date Pain First Started -   Aggravating Factors Walking;Standing   Aggravating Factors Comment -   Limiting Behavior Yes   Relieving Factors Nothing   Relieving Factors Comment -   Result of Injury No           Constitutional:  Well developed, well nourished, in no acute distress. Psychiatric: Affect and mood are appropriate. Integumentary: No rashes or abrasions noted on exposed areas. SPINE/MUSCULOSKELETAL EXAM    Lumbar spine:  No rash, ecchymosis, or gross obliquity. No fasciculations. No focal atrophy is noted. No pain with hip ROM. Full range of motion. No tenderness to palpation.   No tenderness to palpation at the sciatic notch. SI joints non-tender. Trochanters non tender.     Decreased sensation on the L in an S1 distribution.     Positive Straight Leg Raise on the Left.     Updates 5/3/22:  Negative Knapp's sign bilaterally  No clonus in legs    MOTOR:      Biceps  Triceps Deltoids Wrist Ext Wrist Flex Hand Intrin   Right 5/5 5/5 5/5 5/5 5/5 5/5   Left 5/5 5/5 5/5 5/5 5/5 5/5             Hip Flex  Quads Hamstrings Ankle DF EHL Ankle PF   Right 5/5 5/5 5/5 5/5 5/5 5/5   Left 5/5 5/5 5/5 5/5 5/5 5/5     RADIOGRAPHS  LLE EMG by Dr. Ron Aponte 1/8/21:  NCV & EMG Findings:  Evaluation of the left Sup Fibular sensory nerve showed no response (14 cm), no response (Site 2), and no response (Site 3).  The left sural sensory nerve showed no response (Calf), no response (Site 2), and no response (Site 3).  All remaining nerves (as indicated in the following tables) were within normal limits.       Needle evaluation of the left gluteus medius muscle showed moderately increased polyphasic potentials.  The left anterior tibialis muscle showed increased motor unit amplitude and slightly increased polyphasic potentials.  The left posterior tibialis muscle showed slightly increased spontaneous activity, increased motor unit amplitude, and slightly increased polyphasic potentials.  All remaining muscles (as indicated in the following table) showed no evidence of electrical instability.       INTERPRETATION  This is an abnormal electrodiagnostic examination.  These findings may be consistent with:   1. Sensory polyneuropathy - this is based on undetectable sensory responses.    2. Chronic left L5 radiculopathy - this is based on signs of chronicity throughout the L5 myotome.         Lumbar MRI images taken on 8/27/2020 personally reviewed with patient:  Alignment: Intact lordosis  Vertebral body height: Normal  Marrow signal: Unremarkable  Disc spaces: Preserved height and signal intensity  Conus: Terminates at T12-L1     Axial imaging correlation:     T11-12: There is mild signal loss reflecting some early intradiscal degenerative  change     L1-2: Patent canal and foramina.     L2-3: Patent canal and foramina.     L3-4: Signal loss is identified within the disc space. Disc space is not  narrowed. Central canal neuroforamina bilaterally patent. L4-5: Mild signal loss is identified there is a small central protrusion-type  Herniation. There is some increased signal in the synovium of the left L4-5 disc space not seen on adjacent levels consistent with some active inflammation of the synovial lining here may be the most significant pain generator on this exam.     Left and right neuroforamina are spared.     L5-S1: moderate signal loss mild narrowing of the disks present     Incidental anterior herniation and osteophytic formation also identified     Endplates demonstrate decreased signal T1 and increased on T2 consistent with Modic endplate changes reflecting active inflammatory change     The central canal is intact     The left neuroforamina demonstrates herniation osteophyte extending into the  neuroforamina. In addition there are some mild facet arthropathy. These changes appear to be compressing the exiting L5 root on the more lateral sagittal cuts. See image 4 series 3. These changes do not extend into the central canal to cause mass effect on the exiting left S1 nerve root     Right neuroforamina demonstrates similar osteophyte herniation call, defect  however now smaller. Defect does touch inferior surface of the exiting right L5  nerve root.  Nerve does not appear compressed or edematous.      Other structures: Unremarkable.        IMPRESSION  IMPRESSION:     L5-S1 demonstrates degenerative changes and left larger than right herniations  into the neuroforamina compression of the left L5 nerve root  Active inflammation around the L5-S1 disc space  Active inflammation around the left L4-5 facet synovial lining    Lumbar x-ray images taken on 4/30/2020 personally reviewed with patient:  FINDINGS: 5 views of the lumbar spine obtained. Vertebral body heights  preserved. Disc space loss at L5-S1. Lower lumbar endplate spurring. No  significant listhesis. Lumbar lordosis maintained. No discrete pars defects  identified. Lower lumbar mild facet arthropathy. No acute fracture identified.     IMPRESSION  IMPRESSION:     No clearly acute findings. Notable lower lumbar degenerative changes. 15 minutes of face-to-face contact were spent with the patient during today's visit extensively discussing symptoms and treatment plan. All questions were answered. More than half of this visit today was spent on counseling.      Written by Toma Ventura as dictated by Leroy Amezcua MD

## 2022-11-02 NOTE — PROGRESS NOTES
[address]  [address], [address]  [place], [address] [place] [address] Street  Phone: [phone]  Fax: [phone]       Jacky Alvarez  : 1965  PCP: Tr Briones MD  11/3/2022    PROGRESS NOTE    HISTORY OF PRESENT ILLNESS  Sergei Sanchez is a 62 y.o. M  who was seen as a new patient 2020 with c/o hx of stroke(speech deficit) c/o left low leg and low back pain x 4 weeks. He had been doing yard work planting bulbs for a few days prior to his symptoms. Since onset, it worsened despite having gone to the ED twice. None of the medications helped including medrol, muscle relaxers, NSAIDS, tylenol. He had constant symptoms that were provoked mainly by standing and walking. Sitting and laying were positions of comfort. The symptoms ran from the buttock to the side of the leg and top of the foot. His strength was intact, and he had no sensory deficit at the time. He continued to have low back pain radiating into the LLE into the top of the foot. He did not find improvement from a Prednisone dose pack. He did not see much benefit from Gabapentin 900 mg TID. Pt returned with increased symptoms of low back pain radiating into the LLE. He found some benefit initially from Lyrica 225 mg BID but noted that it did not last very long. Pt has been intolerant to [address] secondary to vomiting, blurry vision. He attended PT (-2020; [facility]) with some benefit. Lumbar spine MRI dated 2020 reviewed. Per report, L5-S1 demonstrates degenerative changes and left larger than right herniations into the neuroforamina compression of the left L5 nerve root. Active inflammation around the L5-S1 disc space. Active inflammation around the left L4-5 facet synovial lining. He underwent a left L5 TFESI (2020; Dr. Kavin Bray) with some relief of his LLE pain. He continues to have some residual LLE symptoms, especially in the left buttock, but overall, it has improved.  He continued to have LLE paraesthesia to the calf. He found some benefit with Prednisone. He found benefit from a second left L5 TFESI (12/15/2020; Dr. Darshan Jasso) for about 5 days. He also found improvement from Amitriptyline 75 mg QHS. A LLE EMG dated 1/8/21 was suggestive of:  1. Sensory polyneuropathy - this is based on undetectable sensory responses. 2. Chronic left L5 radiculopathy - this is based on signs of chronicity throughout the L5 myotome. He saw Dr. Ion Pfeiffer and opted to not proceed with surgery. He ran out of Amitriptyline, but he has found it beneficial. He stopped Gabapentin as he thought it was causing headaches. He underwent a left L5 TFESI (10/12/21; Dr. Darshan Jasso) without much benefit. He saw LEA Landaverde who advised he wean off of Amitriptyline and begin Cymbalta 30 mg daily. He found some benefit with Amitriptyline, but he would like something stronger. He did not take Cymbalta. He continued to have low back pain occasionally radiating into the BLE. He has tried Topamax, but it caused some pains and weakness in arms. However, he felt that it did help his leg symptoms and weight loss. He continued to have low back pain. Anderson Mobley was seen today for follow up. Pt notes that lower back pain is improved today during visit. Notes that he continues with Trokendi, Topamax and Gabapentin with benefit. Pain Score: 2/10     Treatments patient has tried:  Physical therapy: yes (2020)  Doing HEP: Yes  Non-opioid medications: yes - Gabapentin, LYRICA, TOPAMAX, Amitriptyline, Flexeril  Spinal injections: Left L5 TFESI (9/22/2020) with benefit; left L5 (12/15/20)  Spinal surgery- None  Last Lumbar Spine MRI: 2020      PmHx: stroke (speech defecits)    ASSESSMENT  Anderson Mobley is a 62 y.o. male with low back pain that radiates into LLE and intermittently into RLE. His symptoms are likely due to a left L5/S1 radiculopathy. He is interested in a slightly increased dosage of Topamax.     PLAN  Increased Topamax to 100 mg QHS  Refill Gabapentin 2 x 600 mg TID  Maintain HEP  Counseled pt on exercise and diet guidelines for healthy adults. Pt is interested in cycling. Pt will f/u in 6 months or sooner if needed. Diagnoses and all orders for this visit:    1. Lumbar radiculopathy    2. History of stroke       PAST MEDICAL HISTORY   Past Medical History:   Diagnosis Date    Anemia     Hypertension     Morbid obesity ([address]     Stroke ([facility] [address]     x 3       Past Surgical History:   Procedure Laterality Date    COLONOSCOPY  2015    COLONOSCOPY N/A 12/15/2021    COLONOSCOPY with polypectomy performed by Kvng Olvera MD at [facility]      Current Outpatient Medications   Medication Sig Dispense Refill    amitriptyline (ELAVIL) 75 mg tablet Take 75 mg by mouth nightly. rosuvastatin (CRESTOR) 40 mg tablet Take 40 mg by mouth nightly. metFORMIN ER (GLUCOPHAGE XR) 500 mg tablet Take 1,000 mg by mouth daily. lisinopriL (PRINIVIL, ZESTRIL) 5 mg tablet TAKE ONE TABLET BY MOUTH DAILY      gabapentin (NEURONTIN) 600 mg tablet Take 2 Tablets by mouth three (3) times daily. Max Daily Amount: 3,600 mg. 180 Tablet 5    cyclobenzaprine (FLEXERIL) 10 mg tablet Take 1 Tablet by mouth three (3) times daily as needed for Muscle Spasm(s). (Patient not taking: Reported on 11/3/2022) 90 Tablet 5    DULoxetine (CYMBALTA) 30 mg capsule Take 30 mg by mouth daily.          ALLERGIES    Allergies   Allergen Reactions    Codeine Other (comments)     VOMITING          SOCIAL HISTORY    Social History     Socioeconomic History    Marital status: SINGLE   Tobacco Use    Smoking status: Every Day     Packs/day: 1.50     Years: 15.00     Pack years: 22.50     Types: Cigarettes    Smokeless tobacco: Never   Vaping Use    Vaping Use: Never used   Substance and Sexual Activity    Alcohol use: Not Currently     Alcohol/week: 0.8 standard drinks     Types: 1 Cans of beer per week     Comment: Quit 2013    Drug use: Never    Sexual activity: Not Currently   Social History Narrative    ** Merged History Encounter **            FAMILY HISTORY    Family History   Problem Relation Age of Onset    Hypertension Mother     High Cholesterol Mother     Elevated Lipids Mother     COPD Father     Heart Failure Brother     Diabetes Brother        REVIEW OF SYSTEMS  Review of Systems   Constitutional:  Negative for chills, fever and weight loss. Respiratory:  Negative for shortness of breath. Cardiovascular:  Negative for chest pain. Gastrointestinal:  Negative for constipation. Negative for fecal incontinence   Genitourinary:  Negative for dysuria. Negative for urinary incontinence   Musculoskeletal:  Positive for back pain (lower). Skin:  Negative for rash. Neurological:  Negative for dizziness, tingling, tremors, focal weakness and headaches. Endo/Heme/Allergies:  Does not bruise/bleed easily. Psychiatric/Behavioral:  The patient does not have insomnia. PHYSICAL EXAMINATION  Visit Vitals  Pulse 93   Temp 98.3 °F (36.8 °C) (Tympanic)   Resp 16   Ht 6' (1.829 m)   Wt 262 lb (118.8 kg)   SpO2 96%   BMI 35.53 kg/m²       Pain Assessment  11/3/2022   Location of Pain Buttocks;Back   Pain Location Comment -   Location Modifiers Left   Severity of Pain 2   Quality of Pain Dull   Quality of Pain Comment -   Duration of Pain Persistent   Frequency of Pain Constant   Date Pain First Started -   Aggravating Factors (No Data)   Aggravating Factors Comment laying   Limiting Behavior No   Relieving Factors Rest;Heat   Relieving Factors Comment -   Result of Injury -       Constitutional:  Well developed, well nourished, in no acute distress. Psychiatric: Affect and mood are appropriate. HEENT: Normocephalic, atraumatic. Extraocular movements intact. Integumentary: No rashes or abrasions noted on exposed areas. Cardiovascular: Regular rate and rhythm. Pulmonary: Clear to auscultation bilaterally.     SPINE/MUSCULOSKELETAL EXAM    Lumbar spine:  No rash, ecchymosis, or gross obliquity. No fasciculations. No focal atrophy is noted. No pain with hip ROM. Full range of motion. No tenderness to palpation. No tenderness to palpation at the sciatic notch. SI joints non-tender. Trochanters non tender. Decreased sensation on the L in an S1 distribution. Positive Straight Leg Raise on the Left. Updates 5/3/22:  Negative Knapp's sign bilaterally  No clonus in legs    MOTOR:      Biceps  Triceps Deltoids Wrist Ext Wrist Flex Hand Intrin   Right 5/5 5/5 5/5 5/5 5/5 5/5   Left 5/5 5/5 5/5 5/5 5/5 5/5             Hip Flex  Quads Hamstrings Ankle DF EHL Ankle PF   Right 5/5 5/5 5/5 5/5 5/5 5/5   Left 5/5 5/5 5/5 5/5 5/5 5/5       Ambulation without assistive device. FWB. RADIOGRAPHS  LLE EMG by Dr. Garima Maynard 1/8/21:  NCV & EMG Findings:  Evaluation of the left Sup Fibular sensory nerve showed no response (14 cm), no response (Site 2), and no response (Site 3). The left sural sensory nerve showed no response (Calf), no response (Site 2), and no response (Site 3). All remaining nerves (as indicated in the following tables) were within normal limits. Needle evaluation of the left gluteus medius muscle showed moderately increased polyphasic potentials. The left anterior tibialis muscle showed increased motor unit amplitude and slightly increased polyphasic potentials. The left posterior tibialis muscle showed slightly increased spontaneous activity, increased motor unit amplitude, and slightly increased polyphasic potentials. All remaining muscles (as indicated in the following table) showed no evidence of electrical instability. INTERPRETATION  This is an abnormal electrodiagnostic examination. These findings may be consistent with:   1. Sensory polyneuropathy - this is based on undetectable sensory responses.     2. Chronic left L5 radiculopathy - this is based on signs of chronicity throughout the L5 myotome. Lumbar MRI images taken on 8/27/2020 personally reviewed with patient:  Alignment: Intact lordosis  Vertebral body height: Normal  Marrow signal: Unremarkable  Disc spaces: Preserved height and signal intensity  Conus: Terminates at T12-L1     Axial imaging correlation:     T11-12: There is mild signal loss reflecting some early intradiscal degenerative  change     L1-2: Patent canal and foramina. L2-3: Patent canal and foramina. L3-4: Signal loss is identified within the disc space. Disc space is not  narrowed. Central canal neuroforamina bilaterally patent. L4-5: Mild signal loss is identified there is a small central protrusion-type  Herniation. There is some increased signal in the synovium of the left L4-5 disc space not seen on adjacent levels consistent with some active inflammation of the synovial lining here may be the most significant pain generator on this exam.     Left and right neuroforamina are spared. L5-S1: moderate signal loss mild narrowing of the disks present     Incidental anterior herniation and osteophytic formation also identified     Endplates demonstrate decreased signal T1 and increased on T2 consistent with Modic endplate changes reflecting active inflammatory change     The central canal is intact     The left neuroforamina demonstrates herniation osteophyte extending into the  neuroforamina. In addition there are some mild facet arthropathy. These changes appear to be compressing the exiting L5 root on the more lateral sagittal cuts. See image 4 series 3. These changes do not extend into the central canal to cause mass effect on the exiting left S1 nerve root     Right neuroforamina demonstrates similar osteophyte herniation call, defect  however now smaller. Defect does touch inferior surface of the exiting right L5  nerve root. Nerve does not appear compressed or edematous. Other structures: Unremarkable.         IMPRESSION  IMPRESSION:     L5-S1 demonstrates degenerative changes and left larger than right herniations  into the neuroforamina compression of the left L5 nerve root  Active inflammation around the L5-S1 disc space  Active inflammation around the left L4-5 facet synovial lining    Lumbar x-ray images taken on 4/30/2020 personally reviewed with patient:  FINDINGS: 5 views of the lumbar spine obtained. Vertebral body heights  preserved. Disc space loss at L5-S1. Lower lumbar endplate spurring. No  significant listhesis. Lumbar lordosis maintained. No discrete pars defects  identified. Lower lumbar mild facet arthropathy. No acute fracture identified. IMPRESSION  IMPRESSION:     No clearly acute findings. Notable lower lumbar degenerative changes. 14 minutes of face-to-face contact were spent with the patient during today's visit extensively discussing symptoms and treatment plan. All questions were answered. More than half of this visit today was spent on counseling. Written by Ursula Alejandre, as dictated by Dr. Alex Jean.

## 2022-11-03 ENCOUNTER — OFFICE VISIT (OUTPATIENT)
Dept: ORTHOPEDIC SURGERY | Age: 57
End: 2022-11-03
Payer: MEDICARE

## 2022-11-03 VITALS
HEIGHT: 72 IN | OXYGEN SATURATION: 96 % | HEART RATE: 93 BPM | TEMPERATURE: 98.3 F | RESPIRATION RATE: 16 BRPM | BODY MASS INDEX: 35.49 KG/M2 | WEIGHT: 262 LBS

## 2022-11-03 DIAGNOSIS — M54.16 LUMBAR RADICULOPATHY: Primary | ICD-10-CM

## 2022-11-03 DIAGNOSIS — Z86.73 HISTORY OF STROKE: ICD-10-CM

## 2022-11-03 PROCEDURE — G8417 CALC BMI ABV UP PARAM F/U: HCPCS | Performed by: PHYSICAL MEDICINE & REHABILITATION

## 2022-11-03 PROCEDURE — 3017F COLORECTAL CA SCREEN DOC REV: CPT | Performed by: PHYSICAL MEDICINE & REHABILITATION

## 2022-11-03 PROCEDURE — 99213 OFFICE O/P EST LOW 20 MIN: CPT | Performed by: PHYSICAL MEDICINE & REHABILITATION

## 2022-11-03 PROCEDURE — G8427 DOCREV CUR MEDS BY ELIG CLIN: HCPCS | Performed by: PHYSICAL MEDICINE & REHABILITATION

## 2022-11-03 PROCEDURE — G8432 DEP SCR NOT DOC, RNG: HCPCS | Performed by: PHYSICAL MEDICINE & REHABILITATION

## 2022-11-03 RX ORDER — GABAPENTIN 600 MG/1
1200 TABLET ORAL 3 TIMES DAILY
Qty: 180 TABLET | Refills: 5 | Status: SHIPPED | OUTPATIENT
Start: 2022-11-03

## 2022-11-03 RX ORDER — TOPIRAMATE 100 MG/1
100 TABLET, FILM COATED ORAL 2 TIMES DAILY
Qty: 60 TABLET | Refills: 1 | Status: SHIPPED | OUTPATIENT
Start: 2022-11-03 | End: 2022-12-03

## 2022-11-03 NOTE — PROGRESS NOTES
Chief Complaint   Patient presents with    Follow-up     6 month       Pt preferred language for health care discussion is english. Is someone accompanying this pt? no    Is the patient using any DME equipment during [address]? no    Depression Screening:  3 most recent PHQ Screens 7/27/2020   Little interest or pleasure in doing things Not at all   Feeling down, depressed, irritable, or hopeless Not at all   Total Score PHQ 2 0       Learning Assessment:  Learning Assessment 7/27/2020   PRIMARY LEARNER Patient   HIGHEST LEVEL OF EDUCATION - PRIMARY LEARNER  GRADUATED HIGH SCHOOL OR GED   BARRIERS PRIMARY LEARNER VISUAL     COGNITIVE   CO-LEARNER CAREGIVER No   PRIMARY LANGUAGE ENGLISH   LEARNER PREFERENCE PRIMARY OTHER (COMMENT)   ANSWERED BY PATIENT   RELATIONSHIP SELF         Health Maintenance reviewed and discussed per provider. Yes        Advance Directive:  1. Do you have an advance directive in place? Patient Reply:no    2. If not, would you like material regarding how to put one in place? Patient Reply: no    Coordination of Care:  1. Have you been to the ER, urgent care clinic since your last visit? Hospitalized since your last visit? no    2. Have you seen or consulted any other health care providers outside of the [address] [place] [address] since your last visit?  Include any pap smears or colon screenin no

## 2022-12-31 DIAGNOSIS — M54.16 LUMBAR RADICULOPATHY: ICD-10-CM

## 2023-01-23 RX ORDER — TOPIRAMATE 100 MG/1
TABLET, FILM COATED ORAL
Qty: 60 TABLET | Refills: 1 | Status: SHIPPED | OUTPATIENT
Start: 2023-01-23

## 2023-03-28 RX ORDER — TOPIRAMATE 100 MG/1
TABLET, FILM COATED ORAL
Qty: 60 TABLET | Refills: 1 | Status: SHIPPED | OUTPATIENT
Start: 2023-03-28

## 2023-11-29 NOTE — PROGRESS NOTES
indicated in the following tables) were within normal  limits. Needle evaluation of the left gluteus medius muscle showed moderately increased polyphasic potentials. The left anterior tibialis muscle showed  increased motor unit amplitude and slightly increased polyphasic potentials. The left posterior tibialis muscle showed slightly increased spontaneous activity, increased motor unit amplitude, and  slightly increased polyphasic potentials. All remaining muscles (as indicated in the following table) showed no evidence of electrical instability. INTERPRETATION   This is an abnormal electrodiagnostic examination. These findings may be consistent with:    1. Sensory polyneuropathy - this is based on undetectable sensory responses. 2. Chronic left L5 radiculopathy - this is based on signs of chronicity throughout the L5 myotome. Lumbar MRI images taken on 8/27/2020 personally reviewed with patient:   Alignment: Intact lordosis   Vertebral body height: Normal   Marrow signal: Unremarkable   Disc spaces: Preserved height and signal intensity   Conus: Terminates at T12-L1       Axial imaging correlation:       T11-12: There is mild signal loss reflecting some early intradiscal degenerative   change       L1-2: Patent canal and foramina. L2-3: Patent canal and foramina. L3-4: Signal loss is identified within the disc space. Disc space is not   narrowed. Central canal neuroforamina bilaterally patent. L4-5: Mild signal loss is identified there is a small central protrusion-type   Herniation. There is some increased signal in the synovium of the left L4-5 disc space not seen on adjacent levels consistent with some active inflammation of the synovial lining here may be the most significant pain generator on this exam.       Left and right neuroforamina are spared.        L5-S1: moderate signal loss mild narrowing of the disks present       Incidental anterior herniation and

## 2023-11-30 ENCOUNTER — OFFICE VISIT (OUTPATIENT)
Age: 58
End: 2023-11-30
Payer: MEDICARE

## 2023-11-30 VITALS
DIASTOLIC BLOOD PRESSURE: 88 MMHG | TEMPERATURE: 98.6 F | SYSTOLIC BLOOD PRESSURE: 139 MMHG | OXYGEN SATURATION: 97 % | BODY MASS INDEX: 34.58 KG/M2 | WEIGHT: 255 LBS | HEART RATE: 82 BPM | RESPIRATION RATE: 20 BRPM

## 2023-11-30 DIAGNOSIS — Z86.73 HISTORY OF STROKE: ICD-10-CM

## 2023-11-30 DIAGNOSIS — M54.16 LUMBAR RADICULOPATHY: Primary | ICD-10-CM

## 2023-11-30 PROCEDURE — 99213 OFFICE O/P EST LOW 20 MIN: CPT | Performed by: PHYSICAL MEDICINE & REHABILITATION

## 2023-11-30 RX ORDER — TOPIRAMATE 100 MG/1
100 TABLET, FILM COATED ORAL
Qty: 90 TABLET | Refills: 1 | Status: SHIPPED | OUTPATIENT
Start: 2023-11-30 | End: 2024-02-28

## 2023-11-30 RX ORDER — PREDNISONE 10 MG/1
TABLET ORAL
Qty: 1 EACH | Refills: 0 | Status: SHIPPED | OUTPATIENT
Start: 2023-11-30

## 2023-11-30 RX ORDER — ASPIRIN 325 MG
TABLET, DELAYED RELEASE (ENTERIC COATED) ORAL
COMMUNITY
Start: 2012-09-20

## 2023-11-30 ASSESSMENT — ENCOUNTER SYMPTOMS
NAUSEA: 0
TROUBLE SWALLOWING: 0
BACK PAIN: 1
DIARRHEA: 0
SHORTNESS OF BREATH: 0
VOMITING: 0

## 2024-05-21 NOTE — PROGRESS NOTES
[facility]  [address], [address]  [place] [address]  Phone: [phone]  Fax: [phone]      Lacho Morales  : 1965  PCP: Harry Mcclure MD  2024    PROGRESS NOTE    HISTORY OF PRESENT ILLNESS    [id number]  Seen as a new patient 2020 with c/o  hx of stroke (speech deficit) c/o left low leg and low back pain x 4 weeks. He had been doing yard work planting bulbs for a few days prior  to his symptoms. Since onset, it worsened despite having gone to the ED twice.  None  of the medications helped including medrol, muscle relaxers , NSAIDS, tylenol.   Prednisone dose pack and Gabapentin 900 mg TID provided minimal relief.   Pt returned with increased symptoms of low back pain radiating into the LLE.   Lyrica 225 mg BID provided initial benefit, but noted that it did not last very long. Pt has been intolerant to LYRICA  secondary to vomiting, blurry vision.   PT (-2020; [facility]) attended with some benefit.   Lumbar spine MRI dated 2020 reviewed. Per report, L5-S1 demonstrates degenerative changes and left larger than right herniations into  the neuroforamina compression of the left L5 nerve root. Active inflammation around the L5-S1 disc space. Active inflammation around the left  L4-5 facet synovial lining.   Left L5 TFESI (2020; Dr. Alvarez) with some relief of his LLE pain. He continues to have  some residual LLE symptoms, especially in the left buttock, but overall, it has improved. He continued to have LLE paraesthesia to the calf.   Prednisone provided some benefit  Second left L5 TFESI  (12/15/2020; Dr. Alvarez) provided benefit for about 5 days.   Amitriptyline 75 mg QHS provided improvement  A  LLE EMG dated 21 was suggestive of:  1. Sensory polyneuropathy - this  is based on undetectable sensory responses. 2. Chronic left L5 radiculopathy - this is based on signs of chronicity throughout the L5  myotome.   He

## 2024-05-24 DIAGNOSIS — Z86.73 HISTORY OF STROKE: ICD-10-CM

## 2024-05-24 DIAGNOSIS — M54.16 LUMBAR RADICULOPATHY: ICD-10-CM

## 2024-05-24 RX ORDER — TOPIRAMATE 100 MG/1
100 TABLET, FILM COATED ORAL NIGHTLY
Qty: 90 TABLET | Refills: 1 | Status: SHIPPED | OUTPATIENT
Start: 2024-05-24

## 2024-05-30 ENCOUNTER — OFFICE VISIT (OUTPATIENT)
Age: 59
End: 2024-05-30
Payer: MEDICARE

## 2024-05-30 VITALS
BODY MASS INDEX: 34.81 KG/M2 | HEART RATE: 83 BPM | OXYGEN SATURATION: 95 % | HEIGHT: 72 IN | WEIGHT: 257 LBS | SYSTOLIC BLOOD PRESSURE: 133 MMHG | DIASTOLIC BLOOD PRESSURE: 88 MMHG

## 2024-05-30 DIAGNOSIS — Z86.73 HISTORY OF STROKE: ICD-10-CM

## 2024-05-30 DIAGNOSIS — M79.18 MYOFASCIAL PAIN: ICD-10-CM

## 2024-05-30 DIAGNOSIS — M54.16 LUMBAR RADICULOPATHY: Primary | ICD-10-CM

## 2024-05-30 PROBLEM — M46.86 OTHER SPECIFIED INFLAMMATORY SPONDYLOPATHIES, LUMBAR REGION (HCC): Status: ACTIVE | Noted: 2022-07-27

## 2024-05-30 PROBLEM — K76.0 FATTY LIVER: Status: ACTIVE | Noted: 2018-10-09

## 2024-05-30 PROBLEM — R80.9 MICROALBUMINURIA: Status: ACTIVE | Noted: 2019-06-10

## 2024-05-30 PROBLEM — G47.09 OTHER INSOMNIA: Status: ACTIVE | Noted: 2022-11-30

## 2024-05-30 PROBLEM — N13.8 BPH WITH OBSTRUCTION/LOWER URINARY TRACT SYMPTOMS: Status: ACTIVE | Noted: 2019-06-10

## 2024-05-30 PROBLEM — L57.0 ACTINIC KERATOSES: Status: ACTIVE | Noted: 2023-07-28

## 2024-05-30 PROBLEM — N40.1 BPH WITH OBSTRUCTION/LOWER URINARY TRACT SYMPTOMS: Status: ACTIVE | Noted: 2019-06-10

## 2024-05-30 PROBLEM — E11.21 DIABETIC NEPHROPATHY ASSOCIATED WITH TYPE 2 DIABETES MELLITUS (HCC): Status: ACTIVE | Noted: 2019-06-10

## 2024-05-30 PROBLEM — E11.21 CONTROLLED TYPE 2 DIABETES MELLITUS WITH DIABETIC NEPHROPATHY, WITHOUT LONG-TERM CURRENT USE OF INSULIN (HCC): Status: ACTIVE | Noted: 2019-06-10

## 2024-05-30 PROCEDURE — 99213 OFFICE O/P EST LOW 20 MIN: CPT | Performed by: PHYSICAL MEDICINE & REHABILITATION

## 2024-05-30 RX ORDER — HYDROXYZINE HYDROCHLORIDE 25 MG/1
25 TABLET, FILM COATED ORAL EVERY 8 HOURS PRN
COMMUNITY
Start: 2024-05-17

## 2024-05-30 RX ORDER — DICLOFENAC SODIUM 75 MG/1
75 TABLET, DELAYED RELEASE ORAL 2 TIMES DAILY PRN
Qty: 60 TABLET | Refills: 2 | Status: SHIPPED | OUTPATIENT
Start: 2024-05-30 | End: 2024-08-28

## 2024-05-30 RX ORDER — TOPIRAMATE 100 MG/1
100 TABLET, FILM COATED ORAL NIGHTLY
Qty: 90 TABLET | Refills: 0 | Status: SHIPPED | OUTPATIENT
Start: 2024-05-30 | End: 2024-08-28

## 2024-05-30 ASSESSMENT — ENCOUNTER SYMPTOMS
BACK PAIN: 1
DIARRHEA: 0
SHORTNESS OF BREATH: 0
VOMITING: 0
NAUSEA: 0
TROUBLE SWALLOWING: 0

## 2024-05-30 NOTE — PATIENT INSTRUCTIONS
Jose Wynn's Big three exercises link:  [email or web address]

## 2025-02-17 DIAGNOSIS — M54.16 LUMBAR RADICULOPATHY: ICD-10-CM

## 2025-02-17 DIAGNOSIS — M79.18 MYOFASCIAL PAIN: ICD-10-CM

## 2025-02-17 RX ORDER — TOPIRAMATE 100 MG/1
100 TABLET, FILM COATED ORAL NIGHTLY
Qty: 90 TABLET | Refills: 0 | OUTPATIENT
Start: 2025-02-17

## 2025-04-01 ENCOUNTER — OFFICE VISIT (OUTPATIENT)
Age: 60
End: 2025-04-01
Payer: MEDICARE

## 2025-04-01 VITALS
OXYGEN SATURATION: 98 % | RESPIRATION RATE: 18 BRPM | HEIGHT: 72 IN | TEMPERATURE: 98.2 F | WEIGHT: 261.6 LBS | BODY MASS INDEX: 35.43 KG/M2 | HEART RATE: 82 BPM

## 2025-04-01 DIAGNOSIS — M54.16 LUMBAR RADICULOPATHY: ICD-10-CM

## 2025-04-01 DIAGNOSIS — M79.18 MYOFASCIAL PAIN: ICD-10-CM

## 2025-04-01 PROCEDURE — 99213 OFFICE O/P EST LOW 20 MIN: CPT | Performed by: NURSE PRACTITIONER

## 2025-04-01 RX ORDER — EZETIMIBE 10 MG/1
TABLET ORAL
COMMUNITY
Start: 2025-03-06

## 2025-04-01 RX ORDER — TOPIRAMATE 100 MG/1
100 TABLET, FILM COATED ORAL NIGHTLY
Qty: 90 TABLET | Refills: 1 | Status: SHIPPED | OUTPATIENT
Start: 2025-04-01 | End: 2025-09-28

## 2025-04-01 NOTE — PROGRESS NOTES
Lacho Morales presents today for   Chief Complaint   Patient presents with    Back Problem    Pain    Back Pain       Is someone accompanying this pt? no    Is the patient using any DME equipment during OV? no    Depression Screening:       No data to display                Learning Assessment:  Failed to redirect to the Timeline version of the [employer] SmartLink.    Abuse Screening:       No data to display                Fall Risk  Failed to redirect to the Timeline version of the [employer] SmartLink.    OPIOID RISK TOOL  Failed to redirect to the Timeline version of the [employer] SmartLink.    Coordination of Care:  1. Have you been to the ER, urgent care clinic since your last visit? no  Hospitalized since your last visit? no    2. Have you seen or consulted any other health care providers outside of the [facility] since your last visit? no Include any pap smears or colon screening. no      
60 tablet 2    topiramate (TOPAMAX) 100 MG tablet TAKE ONE TABLET BY MOUTH ONCE NIGHTLY 90 tablet 1    aspirin 325 MG EC tablet 09/20/2012 Aspirin Oral Delayed Release (Enteric Coated)  PO 2.0 TABLET(S) daily  09/20/2012  active      predniSONE 10 MG (21) TBPK Take as indicated on packaging 1 each 0    amitriptyline (ELAVIL) 75 MG tablet Take 75 mg by mouth (Patient not taking: Reported on 11/30/2023)      gabapentin (NEURONTIN) 600 MG tablet Take 2 tablets by mouth 3 times daily.      lisinopril (PRINIVIL;ZESTRIL) 5 MG tablet TAKE ONE TABLET BY MOUTH DAILY      metFORMIN (GLUCOPHAGE-XR) 500 MG extended release tablet Take 2 tablets by mouth daily      rosuvastatin (CRESTOR) 40 MG tablet Take 1 tablet by mouth       No current facility-administered medications for this visit.       Review of systems:    Past Medical History:   Diagnosis Date    Anemia     Hypertension     Morbid obesity     Stroke (HCC)     x 3     Past Surgical History:   Procedure Laterality Date    COLONOSCOPY  2015    COLONOSCOPY N/A 12/15/2021    COLONOSCOPY with polypectomy performed by Dontrell Alarcon MD at [facility] ENDOSCOPY     Social History     Socioeconomic History    Marital status: Single     Spouse name: Not on file    Number of children: Not on file    Years of education: Not on file    Highest education level: Not on file   Occupational History    Not on file   Tobacco Use    Smoking status: Every Day     Current packs/day: 1.50     Types: Cigarettes    Smokeless tobacco: Never   Substance and Sexual Activity    Alcohol use: Not Currently     Alcohol/week: 0.8 standard drinks of alcohol    Drug use: Never    Sexual activity: Not on file   Other Topics Concern    Not on file   Social History Narrative         ** Merged History Encounter **     Social Drivers of Health     Financial Resource Strain: Not on file   Food Insecurity: Not on file   Transportation Needs: Not on file   Physical Activity: Not on file   Stress: Not on file

## (undated) DEVICE — GOWN ISOL IMPERV UNIV, DISP, OPEN BACK, BLUE --

## (undated) DEVICE — GAUZE,SPONGE,4"X4",16PLY,STRL,LF,10/TRAY: Brand: MEDLINE

## (undated) DEVICE — TRAY MYEL SFTY +

## (undated) DEVICE — SYR 10ML LUER LOK 1/5ML GRAD --

## (undated) DEVICE — FLEX ADVANTAGE 3000CC: Brand: FLEX ADVANTAGE

## (undated) DEVICE — (D)BNDG ADHESIVE FABRIC 3/4X3 -- DISC BY MFR USE ITEM 357960

## (undated) DEVICE — SYR 20ML LL STRL LF --

## (undated) DEVICE — AVANOS* SHORT BEVEL NEEDLE: Brand: AVANOS

## (undated) DEVICE — MEDI-VAC SUCTION HIGH CAPACITY: Brand: CARDINAL HEALTH

## (undated) DEVICE — BANDAGE ADH W0.75XL3IN UNIV WVN FAB NAT GEN USE STRP N ADH

## (undated) DEVICE — (D)NDL SPNE 22GX15CM -- DISC BY MFR W/NO SUB

## (undated) DEVICE — ENDOSCOPY PUMP TUBING/ CAP SET: Brand: ERBE

## (undated) DEVICE — SOLUTION IRRIG 1000ML H2O STRL BLT

## (undated) DEVICE — SNARE POLYP M W27MMXL240CM OVL STIFF DISP CAPTIVATOR

## (undated) DEVICE — FORCEPS BX L240CM JAW DIA2.8MM L CAP W/ NDL MIC MESH TOOTH

## (undated) DEVICE — MEDIA CONTRAST 10ML 200MG/ML 41%

## (undated) DEVICE — SYR 50ML SLIP TIP NSAF LF STRL --

## (undated) DEVICE — FLUFF AND POLYMER UNDERPAD,EXTRA HEAVY: Brand: WINGS

## (undated) DEVICE — CATHETER SUCT TR FL TIP 14FR W/ O CTRL

## (undated) DEVICE — CANNULA NSL AD TBNG L14FT STD PVC O2 CRV CONN NONFLARED NSL

## (undated) DEVICE — TRAP SPEC COLL POLYP POLYSTYR --

## (undated) DEVICE — CANNULA ORIG TL CLR W FOAM CUSHIONS AND 14FT SUPL TB 3 CHN

## (undated) DEVICE — MEDI-VAC NON-CONDUCTIVE SUCTION TUBING: Brand: CARDINAL HEALTH

## (undated) DEVICE — SYRINGE MED 25GA 3ML L5/8IN SUBQ PLAS W/ DETACH NDL SFTY